# Patient Record
Sex: FEMALE | Race: WHITE | NOT HISPANIC OR LATINO | Employment: OTHER | URBAN - METROPOLITAN AREA
[De-identification: names, ages, dates, MRNs, and addresses within clinical notes are randomized per-mention and may not be internally consistent; named-entity substitution may affect disease eponyms.]

---

## 2023-08-19 PROBLEM — R00.2 PALPITATIONS: Status: ACTIVE | Noted: 2023-08-19

## 2023-08-19 PROBLEM — J30.89 NON-SEASONAL ALLERGIC RHINITIS: Status: ACTIVE | Noted: 2023-08-19

## 2023-08-19 PROBLEM — R94.4 ABNORMAL RENAL FUNCTION TEST: Status: ACTIVE | Noted: 2023-08-19

## 2023-08-19 PROBLEM — R49.0 MUSCLE TENSION DYSPHONIA: Status: ACTIVE | Noted: 2023-08-19

## 2023-08-19 PROBLEM — I10 BENIGN ESSENTIAL HYPERTENSION: Status: ACTIVE | Noted: 2023-08-19

## 2023-08-19 PROBLEM — M81.0 OSTEOPOROSIS: Status: ACTIVE | Noted: 2023-08-19

## 2023-08-19 PROBLEM — M70.61 GREATER TROCHANTERIC BURSITIS OF BOTH HIPS: Status: ACTIVE | Noted: 2023-08-19

## 2023-08-19 PROBLEM — E78.2 MIXED HYPERLIPIDEMIA: Status: ACTIVE | Noted: 2023-08-19

## 2023-08-19 PROBLEM — J96.11 CHRONIC RESPIRATORY FAILURE WITH HYPOXIA, ON HOME OXYGEN THERAPY (MULTI): Status: ACTIVE | Noted: 2023-08-19

## 2023-08-19 PROBLEM — M18.0 PRIMARY OSTEOARTHRITIS OF BOTH FIRST CARPOMETACARPAL JOINTS: Status: ACTIVE | Noted: 2023-08-19

## 2023-08-19 PROBLEM — M19.041 PRIMARY OSTEOARTHRITIS OF BOTH HANDS: Status: ACTIVE | Noted: 2023-08-19

## 2023-08-19 PROBLEM — D68.9 COAGULOPATHY (MULTI): Status: ACTIVE | Noted: 2023-08-19

## 2023-08-19 PROBLEM — M17.0 PRIMARY OSTEOARTHRITIS OF BOTH KNEES: Status: ACTIVE | Noted: 2023-08-19

## 2023-08-19 PROBLEM — N18.9: Status: ACTIVE | Noted: 2023-08-19

## 2023-08-19 PROBLEM — I73.9 ASYMPTOMATIC PERIPHERAL VASCULAR DISEASE (CMS-HCC): Status: ACTIVE | Noted: 2023-08-19

## 2023-08-19 PROBLEM — F32.0 CURRENT MILD EPISODE OF MAJOR DEPRESSIVE DISORDER WITHOUT PRIOR EPISODE (CMS-HCC): Status: ACTIVE | Noted: 2023-08-19

## 2023-08-19 PROBLEM — M70.62 GREATER TROCHANTERIC BURSITIS OF BOTH HIPS: Status: ACTIVE | Noted: 2023-08-19

## 2023-08-19 PROBLEM — G47.33 OSA ON CPAP: Status: ACTIVE | Noted: 2023-08-19

## 2023-08-19 PROBLEM — I50.32 CHRONIC DIASTOLIC HEART FAILURE (MULTI): Status: ACTIVE | Noted: 2023-08-19

## 2023-08-19 PROBLEM — R06.00 DYSPNEA: Status: ACTIVE | Noted: 2023-08-19

## 2023-08-19 PROBLEM — R49.0 HOARSENESS OF VOICE: Status: ACTIVE | Noted: 2023-08-19

## 2023-08-19 PROBLEM — F32.4 DEPRESSION, MAJOR, IN PARTIAL REMISSION (CMS-HCC): Status: ACTIVE | Noted: 2023-08-19

## 2023-08-19 PROBLEM — Z99.2 ESRD (END STAGE RENAL DISEASE) ON DIALYSIS (MULTI): Status: ACTIVE | Noted: 2023-08-19

## 2023-08-19 PROBLEM — N18.4 STAGE 4 CHRONIC KIDNEY DISEASE (MULTI): Status: ACTIVE | Noted: 2023-08-19

## 2023-08-19 PROBLEM — N18.6 ESRD (END STAGE RENAL DISEASE) ON DIALYSIS (MULTI): Status: ACTIVE | Noted: 2023-08-19

## 2023-08-19 PROBLEM — Z99.81 CHRONIC RESPIRATORY FAILURE WITH HYPOXIA, ON HOME OXYGEN THERAPY (MULTI): Status: ACTIVE | Noted: 2023-08-19

## 2023-08-19 PROBLEM — R04.0 EPISTAXIS: Status: ACTIVE | Noted: 2023-08-19

## 2023-08-19 PROBLEM — J38.02 BILATERAL PARTIAL VOCAL CORD PARALYSIS: Status: ACTIVE | Noted: 2023-08-19

## 2023-08-19 PROBLEM — M19.042 PRIMARY OSTEOARTHRITIS OF BOTH HANDS: Status: ACTIVE | Noted: 2023-08-19

## 2023-08-19 RX ORDER — NICOTINE POLACRILEX 2 MG
1 GUM BUCCAL
Status: ON HOLD | COMMUNITY
Start: 2020-02-05 | End: 2023-12-04

## 2023-08-19 RX ORDER — MELATONIN 10 MG
10 CAPSULE ORAL NIGHTLY
Status: ON HOLD | COMMUNITY
Start: 2017-11-28 | End: 2023-12-04 | Stop reason: ALTCHOICE

## 2023-08-19 RX ORDER — ACETAMINOPHEN 500 MG
50 TABLET ORAL DAILY
COMMUNITY

## 2023-08-19 RX ORDER — FERROUS SULFATE 325(65) MG
TABLET ORAL
Status: ON HOLD | COMMUNITY
End: 2023-12-04

## 2023-08-19 RX ORDER — ACETAMINOPHEN 325 MG/1
650 TABLET ORAL EVERY 8 HOURS PRN
COMMUNITY

## 2023-08-19 RX ORDER — LORATADINE 10 MG/1
10 TABLET ORAL DAILY PRN
COMMUNITY

## 2023-08-19 RX ORDER — TRAZODONE HYDROCHLORIDE 100 MG/1
100 TABLET ORAL NIGHTLY
COMMUNITY
End: 2023-12-04 | Stop reason: ALTCHOICE

## 2023-08-19 RX ORDER — FLUTICASONE PROPIONATE 50 MCG
2 SPRAY, SUSPENSION (ML) NASAL DAILY
Status: ON HOLD | COMMUNITY
Start: 2017-11-14 | End: 2023-12-04

## 2023-08-19 RX ORDER — IPRATROPIUM BROMIDE AND ALBUTEROL SULFATE 2.5; .5 MG/3ML; MG/3ML
SOLUTION RESPIRATORY (INHALATION)
Status: ON HOLD | COMMUNITY
End: 2023-12-04

## 2023-08-19 RX ORDER — LIDOCAINE AND PRILOCAINE 25; 25 MG/G; MG/G
CREAM TOPICAL
Status: ON HOLD | COMMUNITY
End: 2023-12-04 | Stop reason: ALTCHOICE

## 2023-08-19 RX ORDER — GUAIFENESIN 600 MG/1
600 TABLET, EXTENDED RELEASE ORAL EVERY 12 HOURS PRN
COMMUNITY

## 2023-08-19 RX ORDER — ONDANSETRON 4 MG/1
4 TABLET, ORALLY DISINTEGRATING ORAL EVERY 6 HOURS PRN
Status: ON HOLD | COMMUNITY
End: 2023-12-04 | Stop reason: ALTCHOICE

## 2023-08-19 RX ORDER — FUROSEMIDE 40 MG/1
40 TABLET ORAL DAILY
Status: ON HOLD | COMMUNITY
End: 2023-12-04

## 2023-08-19 RX ORDER — SODIUM BICARBONATE 650 MG/1
650 TABLET ORAL 2 TIMES DAILY
Status: ON HOLD | COMMUNITY
Start: 2017-11-10 | End: 2023-12-04 | Stop reason: ALTCHOICE

## 2023-08-19 RX ORDER — CALCITRIOL 0.25 UG/1
0.25 CAPSULE ORAL
COMMUNITY
Start: 2018-06-05

## 2023-08-19 RX ORDER — ALUMINUM HYDROXIDE, MAGNESIUM HYDROXIDE, AND SIMETHICONE 2400; 240; 2400 MG/30ML; MG/30ML; MG/30ML
SUSPENSION ORAL
COMMUNITY
Start: 2018-10-04 | End: 2023-12-04 | Stop reason: ALTCHOICE

## 2023-08-19 RX ORDER — GABAPENTIN 100 MG/1
100 CAPSULE ORAL NIGHTLY PRN
COMMUNITY
End: 2024-02-13 | Stop reason: ALTCHOICE

## 2023-08-19 RX ORDER — ASPIRIN 81 MG/1
1 TABLET ORAL DAILY
Status: ON HOLD | COMMUNITY
Start: 2015-04-01 | End: 2023-12-04

## 2023-08-19 RX ORDER — B COMPLEX, C NO.20/FOLIC ACID 1 MG
1 CAPSULE ORAL DAILY
COMMUNITY
End: 2024-01-22 | Stop reason: ENTERED-IN-ERROR

## 2023-08-19 RX ORDER — ERGOCALCIFEROL 1.25 MG/1
1 CAPSULE ORAL
COMMUNITY
Start: 2018-03-06 | End: 2023-12-04 | Stop reason: ALTCHOICE

## 2023-08-19 RX ORDER — ERYTHROPOIETIN 20000 [IU]/ML
20000 INJECTION, SOLUTION INTRAVENOUS; SUBCUTANEOUS
COMMUNITY
Start: 2021-03-03 | End: 2023-12-04 | Stop reason: DRUGHIGH

## 2023-08-19 RX ORDER — METOPROLOL TARTRATE 100 MG/1
100 TABLET ORAL EVERY 12 HOURS
COMMUNITY
Start: 2017-11-24 | End: 2023-12-04 | Stop reason: ALTCHOICE

## 2023-08-19 RX ORDER — MECLIZINE HYDROCHLORIDE 25 MG/1
25 TABLET ORAL DAILY PRN
COMMUNITY
Start: 2021-03-03 | End: 2024-02-13 | Stop reason: ALTCHOICE

## 2023-08-19 RX ORDER — ONDANSETRON 4 MG/1
4 TABLET, FILM COATED ORAL
COMMUNITY
Start: 2019-09-16 | End: 2023-12-04 | Stop reason: ALTCHOICE

## 2023-08-19 RX ORDER — ANAGRELIDE 1 MG/1
1 CAPSULE ORAL 2 TIMES DAILY
Status: ON HOLD | COMMUNITY
Start: 2016-04-20 | End: 2023-12-04

## 2023-08-19 RX ORDER — AMLODIPINE BESYLATE 10 MG/1
1 TABLET ORAL DAILY
Status: ON HOLD | COMMUNITY
Start: 2017-02-27 | End: 2023-12-04

## 2023-08-19 RX ORDER — OMEPRAZOLE 20 MG/1
20 CAPSULE, DELAYED RELEASE ORAL DAILY PRN
COMMUNITY
Start: 2017-09-11

## 2023-08-19 RX ORDER — HYDROXYUREA 500 MG/1
15 CAPSULE ORAL
COMMUNITY
Start: 2022-06-09 | End: 2023-10-13 | Stop reason: SDUPTHER

## 2023-08-19 RX ORDER — TRIAMCINOLONE ACETONIDE 1 MG/G
CREAM TOPICAL
COMMUNITY
Start: 2017-11-13 | End: 2023-12-04 | Stop reason: ALTCHOICE

## 2023-10-12 DIAGNOSIS — D47.3 ESSENTIAL THROMBOCYTOSIS (MULTI): Primary | ICD-10-CM

## 2023-10-12 RX ORDER — HYDROXYUREA 500 MG/1
500 CAPSULE ORAL
Qty: 12 CAPSULE | Refills: 0 | Status: CANCELLED | OUTPATIENT
Start: 2023-10-13

## 2023-10-13 RX ORDER — HYDROXYUREA 500 MG/1
500 CAPSULE ORAL
Qty: 12 CAPSULE | Refills: 11 | Status: SHIPPED | OUTPATIENT
Start: 2023-10-13 | End: 2023-11-10

## 2023-10-31 ENCOUNTER — OFFICE VISIT (OUTPATIENT)
Dept: PULMONOLOGY | Facility: CLINIC | Age: 78
End: 2023-10-31
Payer: MEDICARE

## 2023-10-31 VITALS
BODY MASS INDEX: 31.41 KG/M2 | RESPIRATION RATE: 16 BRPM | SYSTOLIC BLOOD PRESSURE: 191 MMHG | HEART RATE: 80 BPM | WEIGHT: 160 LBS | DIASTOLIC BLOOD PRESSURE: 73 MMHG | HEIGHT: 60 IN | OXYGEN SATURATION: 91 %

## 2023-10-31 DIAGNOSIS — G47.33 OSA ON CPAP: Primary | ICD-10-CM

## 2023-10-31 PROCEDURE — 3078F DIAST BP <80 MM HG: CPT | Performed by: INTERNAL MEDICINE

## 2023-10-31 PROCEDURE — 1125F AMNT PAIN NOTED PAIN PRSNT: CPT | Performed by: INTERNAL MEDICINE

## 2023-10-31 PROCEDURE — 3077F SYST BP >= 140 MM HG: CPT | Performed by: INTERNAL MEDICINE

## 2023-10-31 PROCEDURE — 99213 OFFICE O/P EST LOW 20 MIN: CPT | Performed by: INTERNAL MEDICINE

## 2023-10-31 PROCEDURE — 1159F MED LIST DOCD IN RCRD: CPT | Performed by: INTERNAL MEDICINE

## 2023-10-31 PROCEDURE — 1036F TOBACCO NON-USER: CPT | Performed by: INTERNAL MEDICINE

## 2023-10-31 RX ORDER — LOSARTAN POTASSIUM 25 MG/1
100 TABLET ORAL DAILY
COMMUNITY

## 2023-10-31 RX ORDER — CARVEDILOL 25 MG/1
1 TABLET ORAL
COMMUNITY

## 2023-10-31 ASSESSMENT — PATIENT HEALTH QUESTIONNAIRE - PHQ9
SUM OF ALL RESPONSES TO PHQ9 QUESTIONS 1 & 2: 0
1. LITTLE INTEREST OR PLEASURE IN DOING THINGS: NOT AT ALL
2. FEELING DOWN, DEPRESSED OR HOPELESS: NOT AT ALL

## 2023-10-31 ASSESSMENT — COLUMBIA-SUICIDE SEVERITY RATING SCALE - C-SSRS
1. IN THE PAST MONTH, HAVE YOU WISHED YOU WERE DEAD OR WISHED YOU COULD GO TO SLEEP AND NOT WAKE UP?: NO
2. HAVE YOU ACTUALLY HAD ANY THOUGHTS OF KILLING YOURSELF?: NO
6. HAVE YOU EVER DONE ANYTHING, STARTED TO DO ANYTHING, OR PREPARED TO DO ANYTHING TO END YOUR LIFE?: NO

## 2023-10-31 ASSESSMENT — PAIN SCALES - GENERAL: PAINLEVEL: 10-WORST PAIN EVER

## 2023-10-31 ASSESSMENT — ENCOUNTER SYMPTOMS: DEPRESSION: 0

## 2023-10-31 NOTE — PROGRESS NOTES
Department of Medicine  Division of Pulmonary, Critical Care, and Sleep Medicine  Follow-Up Visit  [facility] - [address], [address]    Physician HPI (10/31/2023):  Very pleasant 76-year-old female with history of hypertension, breast cancer, ESRD on HD, HEATH on CPAP presenting for follow-up.  Has been doing well since last visit, only complaining of dyspnea on exertion sometimes, she increased her O2 with activity and its been helping.   Denied any productive cough, no chest pain, no fever or chills.      Immunization History   Administered Date(s) Administered    Hep A, Unspecified 05/15/2003    Influenza, High Dose Seasonal, Preservative Free 10/07/2018    Influenza, Unspecified 01/16/2014, 10/01/2019    Influenza, seasonal, injectable, preservative free 09/08/2015    Pfizer Purple Cap SARS-CoV-2 01/05/2021, 01/26/2021, 12/08/2021       Current Medications:  Current Outpatient Medications   Medication Instructions    acetaminophen (TYLENOL) 650 mg, oral, Every 6 hours PRN    alum-mag hydroxide-simeth (Maalox MAX) 400-400-40 mg/5 mL suspension oral    amLODIPine (Norvasc) 10 mg tablet 1 tablet, oral, Daily    anagrelide (AGRYLIN) 1 mg, oral, 2 times daily    aspirin 81 mg EC tablet 1 tablet, oral, Daily    B complex-vitamin C-folic acid (Triphrocaps) 1 mg capsule 1 capsule, oral, Daily    biotin 1 mg, oral    calcitriol (ROCALTROL) 0.25 mcg, oral, Every Mon/Wed/Fri    carvedilol (Coreg) 25 mg tablet oral, 2 times daily with meals    cholecalciferol (VITAMIN D-3) 50 mcg, oral, Daily    ergocalciferol (Vitamin D-2) 1.25 MG (10760 UT) capsule 1 capsule, oral, Weekly    ferrous sulfate 325 (65 Fe) MG tablet oral    fluticasone (Flonase) 50 mcg/actuation nasal spray 2 sprays, nasal, Daily    furosemide (Lasix) 40 mg tablet oral    gabapentin (NEURONTIN) 100 mg, oral, Nightly    guaiFENesin (MUCINEX) 600 mg, oral, Every 12 hours PRN    hydroxyurea (HYDREA) 500 mg, oral, Every Mon/Wed/Fri     "ipratropium-albuteroL (Duo-Neb) 0.5-2.5 mg/3 mL nebulizer solution inhalation    lidocaine-prilocaine (Emla) 2.5-2.5 % cream Apply topically to affected area Monday, Wednesday, and Friday    loratadine (CLARITIN) 10 mg, oral, Nightly    losartan (COZAAR) 100 mg, oral, Daily    meclizine (ANTIVERT) 25 mg, oral, Daily PRN    melatonin 10 mg, oral, Nightly    metoprolol tartrate (LOPRESSOR) 100 mg, oral, Every 12 hours    omeprazole (PRILOSEC) 20 mg, oral, Daily PRN    ondansetron (ZOFRAN) 4 mg, oral    ondansetron ODT (ZOFRAN-ODT) 4 mg, oral, Every 6 hours PRN    oxygen (O2) gas therapy Oxygen   Refills: 0       Active    Procrit 20,000 Units, subcutaneous, Weekly    sodium bicarbonate 650 mg, oral, 2 times daily    sodium chloride (Ayr) 0.65 % nasal drops 2 drops, nasal, Every 2 hour PRN    traZODone (DESYREL) 100 mg, oral, Nightly    triamcinolone (Kenalog) 0.1 % cream Topical        Drug Allergies/Intolerances:  Allergies   Allergen Reactions    Bee Pollen Unknown    House Dust Unknown    House Dust Mite Unknown    Adhesive Tape-Silicones Rash          Visit Vitals  BP (!) 191/73   Pulse 80   Resp 16   Ht 1.524 m (5')   Wt 72.6 kg (160 lb)   SpO2 91%   BMI 31.25 kg/m²   Smoking Status Never   BSA 1.75 m²        Physical exam  Constitutional: Normal appearance.  HEENT: Normocephalic and atraumatic.  Cardiovascular: Normal rate and regular rhythm.  Pulmonary: Normal respiratory effort, bilateral clear breath sounds, no wheezing or rhonchi.  Musculoskeletal: No edema, no cyanosis.  Neurological: Awake, alert and oriented x3.  Psychiatric: Normal behavior, mood and affect.      Pulmonary Function Test Results     Pulmonary Functions Testing Results:    No results found for: \"FEV1\", \"FVC\", \"ZHK6AIK\", \"TLC\", \"DLCO\"      Chest Radiograph     XR chest 1 view 06/04/2023    Narrative  Interpreted By:  YVES GONZALEZ MD  MRN: [id number]  Patient Name: TAMMIE RICKETTS    STUDY:  CHEST 1 VIEW;  6/4/2023 8:32 " pm    INDICATION:  cough, sore throat, fever .    COMPARISON:  Chest x-ray 11/14/2022    ACCESSION NUMBER(S):  [id number]    ORDERING CLINICIAN:  BETHANY CUETO    FINDINGS:  Multiple overlying leads are present. Multiple surgical clips overlie  bilateral axilla.    CARDIOMEDIASTINAL SILHOUETTE:  Cardiomediastinal silhouette is stable in size and configuration.  Atherosclerotic calcification of the aorta.    LUNGS:  Note is made of low lung volumes with bronchovascular crowding.  Bibasilar airspace opacities favored to relate to atelectasis. No  consolidation, pleural effusion or pneumothorax.    ABDOMEN:  No remarkable upper abdominal findings.    BONES:  Multilevel degenerative changes of the spine. Left shoulder  osteoarthrosis. Suture anchors noted in the right humeral head.    Impression  Hypoventilatory changes with bibasilar airspace opacities favored to  relate to atelectasis.      Echocardiogram     Echocardiogram     [facility], [address] [place] [address]  Tel [phone] and Fax [phone]    TRANSTHORACIC ECHOCARDIOGRAM REPORT      Patient Name:     ERENDIRA MORROW       Reading Physician:   [id number] Jun RICKETTS MD  Study Date:       6/29/2023           Referring Physician: Pema Victoria MD  MRN/PID:          [id number]            PCP:  Accession/Order#: [id number]        Department Location: [facility] Non  Invasive  YOB: 1945          Fellow:  Gender:           F                   Nurse:  Admit Date:                           Sonographer:         Gina Holcomb [facility]  Admission Status: Outpatient          Additional Staff:  Height:           152.40 cm           CC Report to:  Weight:           73.94 kg            Study Type:          Echocardiogram  BSA:              1.71 m2  Blood Pressure: 120 /60 mmHg    Diagnosis/ICD: I50.32-Chronic diastolic (congestive) heart failure (CHF)  Indication:    Congestive  Heart Failure  Procedure/CPT: Echo Complete w Full [id number]    Patient History:  Pertinent History: Chronic diastolic heart failure, CHF, Dyspnea, HTN,  Hyperlipidemia and PVD. Patient on 2L home O2.    Study Detail: The following Echo studies were performed: 2D, M-Mode, Doppler and  color flow. The patient was awake.      PHYSICIAN INTERPRETATION:  Left Ventricle: The left ventricular systolic function is normal, with an estimated ejection fraction of 55-60%. There are no regional wall motion abnormalities. The left ventricular cavity size is normal. Spectral Doppler shows an impaired relaxation pattern of left ventricular diastolic filling.  Left Atrium: The left atrium is moderately dilated.  Right Ventricle: The right ventricle is normal in size. There is normal right ventricular global systolic function.  Right Atrium: The right atrium is mildly dilated.  Aortic Valve: The aortic valve appears structurally normal. There is mild aortic valve regurgitation. The peak instantaneous gradient of the aortic valve is 9.6 mmHg. The mean gradient of the aortic valve is 5.4 mmHg.  Mitral Valve: The mitral valve is normal in structure. There is mild to moderate mitral valve regurgitation.  Tricuspid Valve: The tricuspid valve is structurally normal. There is mild tricuspid regurgitation.  Pulmonic Valve: The pulmonic valve is not well visualized. The pulmonic valve regurgitation was not well visualized.  Pericardium: There is no pericardial effusion noted.  Aorta: The aortic root is normal.  Pulmonary Artery: The tricuspid regurgitant velocity is 3.14 m/s, and with an estimated right atrial pressure of 10 mmHg, the estimated pulmonary artery pressure is mildly elevated with the RVSP at 49.5 mmHg.  Systemic Veins: The inferior vena cava appears to be of normal size.      CONCLUSIONS:  1. Left ventricular systolic function is normal with a 55-60% estimated ejection fraction.  2. Spectral Doppler shows an impaired  relaxation pattern of left ventricular diastolic filling.  3. The left atrium is moderately dilated.  4. Mild to moderate mitral valve regurgitation.  5. Mild aortic valve regurgitation.  6. There is mild tricuspid regurgitation.  7. The estimated pulmonary artery pressure is mild-moderately elevated with the RVSP at 49.5 mmHg.    QUANTITATIVE DATA SUMMARY:  2D MEASUREMENTS:  Normal Ranges:  IVSd:          1.38 cm    (0.6-1.1cm)  LVPWd:         1.36 cm    (0.6-1.1cm)  LVIDd:         4.23 cm    (3.9-5.9cm)  LVIDs:         2.87 cm  LV Mass Index: 128.3 g/m2  LV % FS        32.3 %    LA VOLUME:  Normal Ranges:  LA Vol A4C:        78.2 ml    (22+/-6mL/m2)  LA Vol A2C:        73.5 ml  LA Vol BP:         77.8 ml  LA Vol Index A4C:  45.7ml/m2  LA Vol Index A2C:  42.9 ml/m2  LA Vol Index BP:   45.5 ml/m2  LA Area A4C:       23.5 cm2  LA Area A2C:       22.2 cm2  LA Major Axis A4C: 6.0 cm  LA Major Axis A2C: 5.7 cm  LA Volume Index:   45.7 ml/m2  LA Vol A4C:        72.5 ml  LA Vol A2C:        68.1 ml    RA VOLUME BY A/L METHOD:  Normal Ranges:  RA Area A4C: 16.7 cm2    M-MODE MEASUREMENTS:  Normal Ranges:  Ao Root: 3.20 cm (2.0-3.7cm)  AoV Exc: 1.68 cm (1.5-2.5cm)  LAs:     4.02 cm (2.7-4.0cm)    AORTA MEASUREMENTS:  Normal Ranges:  AoV Exc:     1.68 cm (1.5-2.5cm)  Ao Sinus, d: 3.00 cm (2.1-3.5cm)  Asc Ao, d:   2.70 cm (2.1-3.4cm)    LV SYSTOLIC FUNCTION BY 2D PLANIMETRY (MOD):  Normal Ranges:  EF-A4C View: 57.5 % (>=55%)  EF-A2C View: 60.7 %  EF-Biplane:  59.8 %    LV DIASTOLIC FUNCTION:  Normal Ranges:  MV Peak E:        1.17 m/s    (0.7-1.2 m/s)  MV Peak A:        0.99 m/s    (0.42-0.7 m/s)  E/A Ratio:        1.18        (1.0-2.2)  MV e'             0.09 m/s    (>8.0)  MV lateral e'     0.09 m/s  MV medial e'      0.05 m/s  MV A Dur:         136.10 msec  E/e' Ratio:       13.00       (<8.0)  PulmV Sys Chidi:    76.30 cm/s  PulmV Wells Chidi:   64.52 cm/s  PulmV S/D Chidi:    1.18  PulmV A Revs Chidi: 22.72 cm/s  PulmV A Revs Dur:  103.81 msec    MITRAL VALVE:  Normal Ranges:  MV Vmax:    1.25 m/s (<=1.3m/s)  MV peak P.3 mmHg (<5mmHg)  MV mean P.5 mmHg (<2mmHg)  MV VTI:     32.29 cm (10-13cm)  MV DT:      163 msec (150-240msec)    AORTIC VALVE:  Normal Ranges:  AoV Vmax:                1.55 m/s (<=1.7m/s)  AoV Peak P.6 mmHg (<20mmHg)  AoV Mean P.4 mmHg (1.7-11.5mmHg)  LVOT Max Chidi:            1.02 m/s (<=1.1m/s)  AoV VTI:                 41.23 cm (18-25cm)  LVOT VTI:                25.78 cm  LVOT Diameter:           2.02 cm  (1.8-2.4cm)  AoV Area, VTI:           2.01 cm2 (2.5-5.5cm2)  AoV Area,Vmax:           2.11 cm2 (2.5-4.5cm2)  AoV Dimensionless Index: 0.63    AORTIC INSUFFICIENCY:  AI Vmax:       3.61 m/s  AI Half-time:  359 msec  AI Decel Time: 1238 msec  AI Decel Rate: 291.96 cm/s2      RIGHT VENTRICLE:  RV Basal 3.00 cm  RV Mid   2.40 cm  RV Major 7.5 cm  TAPSE:   25.0 mm  RV s'    0.00 m/s    TRICUSPID VALVE/RVSP:  Normal Ranges:  Peak TR Velocity: 3.14 m/s  RV Syst Pressure: 49.5 mmHg (< 30mmHg)  TV E Vmax:        0.34 m/s  (0.3-0.7m/s)  TV A Vmax:        0.50 m/s  IVC Diam:         2.14 cm    PULMONIC VALVE:  Normal Ranges:  PV Max Chidi: 1.5 m/s  (0.6-0.9m/s)  PV Max P.6 mmHg    Pulmonary Veins:  PulmV A Revs Dur: 103.81 msec  PulmV A Revs Chidi: 22.72 cm/s  PulmV Wells Chidi:   64.52 cm/s  PulmV S/D Chidi:    1.18  PulmV Sys Chidi:    76.30 cm/s    AORTA:  Asc Ao Diam 2.74 cm      [id number] Jun Ruiz MD  Electronically signed on 2023 at 5:25:57 PM         Final        Chest CT Scan     No results found for this or any previous visit from the past 365 days.       Laboratory Studies     Lab Results   Component Value Date    WBC 12.8 (H) 2023    HGB 11.0 (L) 2023    HCT 35.1 (L) 2023     (H) 2023     2023      Lab Results   Component Value Date    GLUCOSE 101 (H) 2023    CALCIUM 10.1 2023     (L) 2023    K 4.7 2023     "CO2 25 06/04/2023    CL 94 (L) 06/04/2023    BUN 70 (H) 06/04/2023    CREATININE 8.92 (H) 06/04/2023      Lab Results   Component Value Date    ALT 23 12/20/2022    AST 21 12/20/2022    ALKPHOS 201 (H) 12/20/2022    BILITOT 0.3 12/20/2022        Sputum Culture     No results found for: \"AFBCX\"   No results found for: \"RESPCULTCYFI\"  No results found for the last 90 days.          Assessment and Plan / Recommendations     Very pleasant 76-year-old female with history of hypertension, breast cancer, ESRD on HD, HEATH on CPAP presenting for follow-up.    1.  HEATH on CPAP, CPAP pressure increased to 15 and seems to be working better, using CPAP faithfully every night, only complaining of poor fitting and air leak  30 days download  Compliance 96.7%  Average use 9 hours and 28 minutes  AHI 4.7 (was 15 before increasing pressure)  -Continue CPAP current settings, will try DreamWear mask  -Continue O2 during the day and with CPAP  -Return to clinic in 1 year or sooner with any concerns    This dictation was created using voice recognition software. Phonetic and/or minor grammatical errors may exist.        Mercedes Manning MD   10/31/2023    "

## 2023-11-28 DIAGNOSIS — G47.33 OSA ON CPAP: ICD-10-CM

## 2023-12-03 ENCOUNTER — APPOINTMENT (OUTPATIENT)
Dept: RADIOLOGY | Facility: HOSPITAL | Age: 78
DRG: 304 | End: 2023-12-03
Payer: MEDICARE

## 2023-12-03 ENCOUNTER — HOSPITAL ENCOUNTER (OUTPATIENT)
Facility: HOSPITAL | Age: 78
Setting detail: OBSERVATION
LOS: 1 days | Discharge: HOME | DRG: 304 | End: 2023-12-05
Attending: STUDENT IN AN ORGANIZED HEALTH CARE EDUCATION/TRAINING PROGRAM | Admitting: INTERNAL MEDICINE
Payer: MEDICARE

## 2023-12-03 DIAGNOSIS — I10 BENIGN ESSENTIAL HYPERTENSION: ICD-10-CM

## 2023-12-03 DIAGNOSIS — R06.02 SHORTNESS OF BREATH: ICD-10-CM

## 2023-12-03 DIAGNOSIS — N18.6 ESRD NEEDING DIALYSIS (MULTI): ICD-10-CM

## 2023-12-03 DIAGNOSIS — Z99.2 ESRD NEEDING DIALYSIS (MULTI): ICD-10-CM

## 2023-12-03 DIAGNOSIS — I15.1 HYPERTENSION SECONDARY TO OTHER RENAL DISORDERS: Primary | ICD-10-CM

## 2023-12-03 DIAGNOSIS — J90 PLEURAL EFFUSION: ICD-10-CM

## 2023-12-03 LAB
ANION GAP BLDV CALCULATED.4IONS-SCNC: 11 MMOL/L (ref 10–25)
APPARATUS: ABNORMAL
BASE EXCESS BLDV CALC-SCNC: 2.8 MMOL/L (ref -2–3)
BASOPHILS # BLD AUTO: 0.05 X10*3/UL (ref 0–0.1)
BASOPHILS NFR BLD AUTO: 0.5 %
BODY TEMPERATURE: ABNORMAL
CA-I BLDV-SCNC: 1.18 MMOL/L (ref 1.1–1.33)
CHLORIDE BLDV-SCNC: 98 MMOL/L (ref 98–107)
EOSINOPHIL # BLD AUTO: 0.45 X10*3/UL (ref 0–0.4)
EOSINOPHIL NFR BLD AUTO: 4.3 %
ERYTHROCYTE [DISTWIDTH] IN BLOOD BY AUTOMATED COUNT: 15 % (ref 11.5–14.5)
GLUCOSE BLDV-MCNC: 127 MG/DL (ref 74–99)
HCO3 BLDV-SCNC: 28.5 MMOL/L (ref 22–26)
HCT VFR BLD AUTO: 35.1 % (ref 36–46)
HCT VFR BLD EST: 53 % (ref 36–46)
HGB BLD-MCNC: 11.4 G/DL (ref 12–16)
HGB BLDV-MCNC: 17.7 G/DL (ref 12–16)
IMM GRANULOCYTES # BLD AUTO: 0.06 X10*3/UL (ref 0–0.5)
IMM GRANULOCYTES NFR BLD AUTO: 0.6 % (ref 0–0.9)
INHALED O2 CONCENTRATION: 28 %
LACTATE BLDV-SCNC: 1.3 MMOL/L (ref 0.4–2)
LYMPHOCYTES # BLD AUTO: 1.22 X10*3/UL (ref 0.8–3)
LYMPHOCYTES NFR BLD AUTO: 11.6 %
MCH RBC QN AUTO: 33.1 PG (ref 26–34)
MCHC RBC AUTO-ENTMCNC: 32.5 G/DL (ref 32–36)
MCV RBC AUTO: 102 FL (ref 80–100)
MONOCYTES # BLD AUTO: 0.89 X10*3/UL (ref 0.05–0.8)
MONOCYTES NFR BLD AUTO: 8.5 %
NEUTROPHILS # BLD AUTO: 7.85 X10*3/UL (ref 1.6–5.5)
NEUTROPHILS NFR BLD AUTO: 74.5 %
NRBC BLD-RTO: 0 /100 WBCS (ref 0–0)
OXYHGB MFR BLDV: 77.4 % (ref 45–75)
PCO2 BLDV: 46 MM HG (ref 41–51)
PH BLDV: 7.4 PH (ref 7.33–7.43)
PLATELET # BLD AUTO: 390 X10*3/UL (ref 150–450)
PO2 BLDV: 51 MM HG (ref 35–45)
POTASSIUM BLDV-SCNC: 4.7 MMOL/L (ref 3.5–5.3)
RBC # BLD AUTO: 3.44 X10*6/UL (ref 4–5.2)
SAO2 % BLDV: 79 % (ref 45–75)
SODIUM BLDV-SCNC: 133 MMOL/L (ref 136–145)
WBC # BLD AUTO: 10.5 X10*3/UL (ref 4.4–11.3)

## 2023-12-03 PROCEDURE — 83880 ASSAY OF NATRIURETIC PEPTIDE: CPT | Performed by: STUDENT IN AN ORGANIZED HEALTH CARE EDUCATION/TRAINING PROGRAM

## 2023-12-03 PROCEDURE — 71045 X-RAY EXAM CHEST 1 VIEW: CPT | Mod: FY

## 2023-12-03 PROCEDURE — 71045 X-RAY EXAM CHEST 1 VIEW: CPT | Performed by: RADIOLOGY

## 2023-12-03 PROCEDURE — 85018 HEMOGLOBIN: CPT | Mod: 59 | Performed by: STUDENT IN AN ORGANIZED HEALTH CARE EDUCATION/TRAINING PROGRAM

## 2023-12-03 PROCEDURE — 36415 COLL VENOUS BLD VENIPUNCTURE: CPT | Performed by: STUDENT IN AN ORGANIZED HEALTH CARE EDUCATION/TRAINING PROGRAM

## 2023-12-03 PROCEDURE — 84295 ASSAY OF SERUM SODIUM: CPT | Performed by: STUDENT IN AN ORGANIZED HEALTH CARE EDUCATION/TRAINING PROGRAM

## 2023-12-03 PROCEDURE — 94760 N-INVAS EAR/PLS OXIMETRY 1: CPT

## 2023-12-03 PROCEDURE — 99291 CRITICAL CARE FIRST HOUR: CPT | Performed by: STUDENT IN AN ORGANIZED HEALTH CARE EDUCATION/TRAINING PROGRAM

## 2023-12-03 PROCEDURE — 85025 COMPLETE CBC W/AUTO DIFF WBC: CPT | Performed by: STUDENT IN AN ORGANIZED HEALTH CARE EDUCATION/TRAINING PROGRAM

## 2023-12-03 PROCEDURE — 87636 SARSCOV2 & INF A&B AMP PRB: CPT | Performed by: STUDENT IN AN ORGANIZED HEALTH CARE EDUCATION/TRAINING PROGRAM

## 2023-12-03 PROCEDURE — 84484 ASSAY OF TROPONIN QUANT: CPT | Performed by: STUDENT IN AN ORGANIZED HEALTH CARE EDUCATION/TRAINING PROGRAM

## 2023-12-03 RX ORDER — NITROGLYCERIN 0.4 MG/1
0.4 TABLET SUBLINGUAL ONCE
Status: COMPLETED | OUTPATIENT
Start: 2023-12-03 | End: 2023-12-04

## 2023-12-03 ASSESSMENT — COLUMBIA-SUICIDE SEVERITY RATING SCALE - C-SSRS
1. IN THE PAST MONTH, HAVE YOU WISHED YOU WERE DEAD OR WISHED YOU COULD GO TO SLEEP AND NOT WAKE UP?: NO
6. HAVE YOU EVER DONE ANYTHING, STARTED TO DO ANYTHING, OR PREPARED TO DO ANYTHING TO END YOUR LIFE?: NO
2. HAVE YOU ACTUALLY HAD ANY THOUGHTS OF KILLING YOURSELF?: NO

## 2023-12-03 ASSESSMENT — LIFESTYLE VARIABLES
HAVE PEOPLE ANNOYED YOU BY CRITICIZING YOUR DRINKING: NO
HAVE YOU EVER FELT YOU SHOULD CUT DOWN ON YOUR DRINKING: NO
EVER FELT BAD OR GUILTY ABOUT YOUR DRINKING: NO
EVER HAD A DRINK FIRST THING IN THE MORNING TO STEADY YOUR NERVES TO GET RID OF A HANGOVER: NO
REASON UNABLE TO ASSESS: NO

## 2023-12-03 ASSESSMENT — PAIN - FUNCTIONAL ASSESSMENT: PAIN_FUNCTIONAL_ASSESSMENT: 0-10

## 2023-12-03 ASSESSMENT — PAIN SCALES - GENERAL: PAINLEVEL_OUTOF10: 0 - NO PAIN

## 2023-12-04 ENCOUNTER — APPOINTMENT (OUTPATIENT)
Dept: DIALYSIS | Facility: HOSPITAL | Age: 78
End: 2023-12-04
Payer: MEDICARE

## 2023-12-04 ENCOUNTER — APPOINTMENT (OUTPATIENT)
Dept: CARDIOLOGY | Facility: HOSPITAL | Age: 78
DRG: 304 | End: 2023-12-04
Payer: MEDICARE

## 2023-12-04 PROBLEM — I15.1 HYPERTENSION SECONDARY TO OTHER RENAL DISORDERS: Status: ACTIVE | Noted: 2023-12-04

## 2023-12-04 PROBLEM — Z85.3 HISTORY OF MALIGNANT NEOPLASM OF BREAST: Status: ACTIVE | Noted: 2023-12-04

## 2023-12-04 PROBLEM — D47.3 ESSENTIAL THROMBOCYTHEMIA (MULTI): Status: ACTIVE | Noted: 2023-12-04

## 2023-12-04 LAB
ALBUMIN SERPL BCP-MCNC: 3.7 G/DL (ref 3.4–5)
ALBUMIN SERPL BCP-MCNC: 3.7 G/DL (ref 3.4–5)
ALP SERPL-CCNC: 233 U/L (ref 33–136)
ALP SERPL-CCNC: 236 U/L (ref 33–136)
ALT SERPL W P-5'-P-CCNC: 46 U/L (ref 7–45)
ALT SERPL W P-5'-P-CCNC: 51 U/L (ref 7–45)
ANION GAP SERPL CALC-SCNC: 21 MMOL/L (ref 10–20)
ANION GAP SERPL CALC-SCNC: 23 MMOL/L (ref 10–20)
AST SERPL W P-5'-P-CCNC: 33 U/L (ref 9–39)
AST SERPL W P-5'-P-CCNC: 50 U/L (ref 9–39)
BILIRUB SERPL-MCNC: 0.3 MG/DL (ref 0–1.2)
BILIRUB SERPL-MCNC: 0.4 MG/DL (ref 0–1.2)
BNP SERPL-MCNC: 1761 PG/ML (ref 0–99)
BUN SERPL-MCNC: 77 MG/DL (ref 6–23)
BUN SERPL-MCNC: 81 MG/DL (ref 6–23)
CALCIUM SERPL-MCNC: 9.1 MG/DL (ref 8.6–10.3)
CALCIUM SERPL-MCNC: 9.7 MG/DL (ref 8.6–10.3)
CARDIAC TROPONIN I PNL SERPL HS: 37 NG/L (ref 0–13)
CARDIAC TROPONIN I PNL SERPL HS: 62 NG/L (ref 0–13)
CARDIAC TROPONIN I PNL SERPL HS: 64 NG/L (ref 0–13)
CARDIAC TROPONIN I PNL SERPL HS: 67 NG/L (ref 0–13)
CARDIAC TROPONIN I PNL SERPL HS: 67 NG/L (ref 0–13)
CHLORIDE SERPL-SCNC: 96 MMOL/L (ref 98–107)
CHLORIDE SERPL-SCNC: 97 MMOL/L (ref 98–107)
CO2 SERPL-SCNC: 25 MMOL/L (ref 21–32)
CO2 SERPL-SCNC: 26 MMOL/L (ref 21–32)
CREAT SERPL-MCNC: 7.81 MG/DL (ref 0.5–1.05)
CREAT SERPL-MCNC: 8.69 MG/DL (ref 0.5–1.05)
ERYTHROCYTE [DISTWIDTH] IN BLOOD BY AUTOMATED COUNT: 14.9 % (ref 11.5–14.5)
FLUAV RNA RESP QL NAA+PROBE: NOT DETECTED
FLUBV RNA RESP QL NAA+PROBE: NOT DETECTED
GFR SERPL CREATININE-BSD FRML MDRD: 4 ML/MIN/1.73M*2
GFR SERPL CREATININE-BSD FRML MDRD: 5 ML/MIN/1.73M*2
GGT SERPL-CCNC: 184 U/L (ref 5–55)
GLUCOSE SERPL-MCNC: 111 MG/DL (ref 74–99)
GLUCOSE SERPL-MCNC: 128 MG/DL (ref 74–99)
HBV SURFACE AB SER-ACNC: 561.7 MIU/ML
HBV SURFACE AG SERPL QL IA: NONREACTIVE
HCT VFR BLD AUTO: 34.1 % (ref 36–46)
HGB BLD-MCNC: 10.8 G/DL (ref 12–16)
MAGNESIUM SERPL-MCNC: 2.38 MG/DL (ref 1.6–2.4)
MCH RBC QN AUTO: 33.1 PG (ref 26–34)
MCHC RBC AUTO-ENTMCNC: 31.7 G/DL (ref 32–36)
MCV RBC AUTO: 105 FL (ref 80–100)
NRBC BLD-RTO: 0 /100 WBCS (ref 0–0)
PHOSPHATE SERPL-MCNC: 4.7 MG/DL (ref 2.5–4.9)
PLATELET # BLD AUTO: 381 X10*3/UL (ref 150–450)
POTASSIUM SERPL-SCNC: 5 MMOL/L (ref 3.5–5.3)
POTASSIUM SERPL-SCNC: 5.4 MMOL/L (ref 3.5–5.3)
PROT SERPL-MCNC: 5.9 G/DL (ref 6.4–8.2)
PROT SERPL-MCNC: 6.6 G/DL (ref 6.4–8.2)
RBC # BLD AUTO: 3.26 X10*6/UL (ref 4–5.2)
SARS-COV-2 RNA RESP QL NAA+PROBE: NOT DETECTED
SODIUM SERPL-SCNC: 138 MMOL/L (ref 136–145)
SODIUM SERPL-SCNC: 140 MMOL/L (ref 136–145)
WBC # BLD AUTO: 10.8 X10*3/UL (ref 4.4–11.3)

## 2023-12-04 PROCEDURE — 8010000001 HC DIALYSIS - HEMODIALYSIS PER DAY

## 2023-12-04 PROCEDURE — 5A1D70Z PERFORMANCE OF URINARY FILTRATION, INTERMITTENT, LESS THAN 6 HOURS PER DAY: ICD-10-PCS | Performed by: INTERNAL MEDICINE

## 2023-12-04 PROCEDURE — 2500000001 HC RX 250 WO HCPCS SELF ADMINISTERED DRUGS (ALT 637 FOR MEDICARE OP)

## 2023-12-04 PROCEDURE — 80053 COMPREHEN METABOLIC PANEL: CPT

## 2023-12-04 PROCEDURE — 36415 COLL VENOUS BLD VENIPUNCTURE: CPT | Performed by: STUDENT IN AN ORGANIZED HEALTH CARE EDUCATION/TRAINING PROGRAM

## 2023-12-04 PROCEDURE — 2500000001 HC RX 250 WO HCPCS SELF ADMINISTERED DRUGS (ALT 637 FOR MEDICARE OP): Performed by: INTERNAL MEDICINE

## 2023-12-04 PROCEDURE — 99291 CRITICAL CARE FIRST HOUR: CPT | Performed by: INTERNAL MEDICINE

## 2023-12-04 PROCEDURE — 2500000004 HC RX 250 GENERAL PHARMACY W/ HCPCS (ALT 636 FOR OP/ED)

## 2023-12-04 PROCEDURE — 84100 ASSAY OF PHOSPHORUS: CPT

## 2023-12-04 PROCEDURE — 2500000002 HC RX 250 W HCPCS SELF ADMINISTERED DRUGS (ALT 637 FOR MEDICARE OP, ALT 636 FOR OP/ED)

## 2023-12-04 PROCEDURE — 96372 THER/PROPH/DIAG INJ SC/IM: CPT

## 2023-12-04 PROCEDURE — 84484 ASSAY OF TROPONIN QUANT: CPT | Performed by: STUDENT IN AN ORGANIZED HEALTH CARE EDUCATION/TRAINING PROGRAM

## 2023-12-04 PROCEDURE — 86706 HEP B SURFACE ANTIBODY: CPT | Mod: GEALAB | Performed by: INTERNAL MEDICINE

## 2023-12-04 PROCEDURE — 85027 COMPLETE CBC AUTOMATED: CPT

## 2023-12-04 PROCEDURE — 82977 ASSAY OF GGT: CPT

## 2023-12-04 PROCEDURE — 5A09357 ASSISTANCE WITH RESPIRATORY VENTILATION, LESS THAN 24 CONSECUTIVE HOURS, CONTINUOUS POSITIVE AIRWAY PRESSURE: ICD-10-PCS | Performed by: INTERNAL MEDICINE

## 2023-12-04 PROCEDURE — 87340 HEPATITIS B SURFACE AG IA: CPT | Mod: GEALAB | Performed by: INTERNAL MEDICINE

## 2023-12-04 PROCEDURE — 84484 ASSAY OF TROPONIN QUANT: CPT

## 2023-12-04 PROCEDURE — G0378 HOSPITAL OBSERVATION PER HR: HCPCS

## 2023-12-04 PROCEDURE — 94660 CPAP INITIATION&MGMT: CPT

## 2023-12-04 PROCEDURE — 1200000002 HC GENERAL ROOM WITH TELEMETRY DAILY

## 2023-12-04 PROCEDURE — 93005 ELECTROCARDIOGRAM TRACING: CPT

## 2023-12-04 PROCEDURE — 2500000001 HC RX 250 WO HCPCS SELF ADMINISTERED DRUGS (ALT 637 FOR MEDICARE OP): Performed by: STUDENT IN AN ORGANIZED HEALTH CARE EDUCATION/TRAINING PROGRAM

## 2023-12-04 PROCEDURE — 2500000005 HC RX 250 GENERAL PHARMACY W/O HCPCS: Performed by: STUDENT IN AN ORGANIZED HEALTH CARE EDUCATION/TRAINING PROGRAM

## 2023-12-04 PROCEDURE — 83735 ASSAY OF MAGNESIUM: CPT

## 2023-12-04 RX ORDER — DOCUSATE SODIUM 100 MG/1
200 CAPSULE, LIQUID FILLED ORAL NIGHTLY
Status: DISCONTINUED | OUTPATIENT
Start: 2023-12-04 | End: 2023-12-05 | Stop reason: HOSPADM

## 2023-12-04 RX ORDER — AMITRIPTYLINE HYDROCHLORIDE 10 MG/1
10 TABLET, FILM COATED ORAL NIGHTLY
Status: DISCONTINUED | OUTPATIENT
Start: 2023-12-04 | End: 2023-12-05 | Stop reason: HOSPADM

## 2023-12-04 RX ORDER — NITROGLYCERIN 20 MG/100ML
100 INJECTION INTRAVENOUS CONTINUOUS
Status: DISCONTINUED | OUTPATIENT
Start: 2023-12-04 | End: 2023-12-04

## 2023-12-04 RX ORDER — AMITRIPTYLINE HYDROCHLORIDE 10 MG/1
10 TABLET, FILM COATED ORAL DAILY
COMMUNITY
Start: 2023-11-26

## 2023-12-04 RX ORDER — ONDANSETRON HYDROCHLORIDE 2 MG/ML
4 INJECTION, SOLUTION INTRAVENOUS EVERY 4 HOURS PRN
Status: DISCONTINUED | OUTPATIENT
Start: 2023-12-04 | End: 2023-12-05 | Stop reason: HOSPADM

## 2023-12-04 RX ORDER — IPRATROPIUM BROMIDE AND ALBUTEROL SULFATE 2.5; .5 MG/3ML; MG/3ML
3 SOLUTION RESPIRATORY (INHALATION) 3 TIMES DAILY PRN
Status: DISCONTINUED | OUTPATIENT
Start: 2023-12-04 | End: 2023-12-04

## 2023-12-04 RX ORDER — LOSARTAN POTASSIUM 50 MG/1
100 TABLET ORAL DAILY
Status: DISCONTINUED | OUTPATIENT
Start: 2023-12-04 | End: 2023-12-04

## 2023-12-04 RX ORDER — HEPARIN SODIUM 5000 [USP'U]/ML
5000 INJECTION, SOLUTION INTRAVENOUS; SUBCUTANEOUS EVERY 8 HOURS
Status: DISCONTINUED | OUTPATIENT
Start: 2023-12-04 | End: 2023-12-05 | Stop reason: HOSPADM

## 2023-12-04 RX ORDER — CARVEDILOL 12.5 MG/1
25 TABLET ORAL 2 TIMES DAILY
Status: DISCONTINUED | OUTPATIENT
Start: 2023-12-04 | End: 2023-12-05

## 2023-12-04 RX ORDER — MECLIZINE HYDROCHLORIDE 25 MG/1
25 TABLET ORAL DAILY PRN
Status: DISCONTINUED | OUTPATIENT
Start: 2023-12-04 | End: 2023-12-05 | Stop reason: HOSPADM

## 2023-12-04 RX ORDER — AMLODIPINE BESYLATE 5 MG/1
2.5 TABLET ORAL DAILY
Status: DISCONTINUED | OUTPATIENT
Start: 2023-12-04 | End: 2023-12-05

## 2023-12-04 RX ORDER — LABETALOL HYDROCHLORIDE 5 MG/ML
20 INJECTION, SOLUTION INTRAVENOUS EVERY 6 HOURS PRN
Status: DISCONTINUED | OUTPATIENT
Start: 2023-12-04 | End: 2023-12-04

## 2023-12-04 RX ORDER — NICARDIPINE HYDROCHLORIDE 0.2 MG/ML
0-15 INJECTION INTRAVENOUS CONTINUOUS
Status: DISCONTINUED | OUTPATIENT
Start: 2023-12-04 | End: 2023-12-04

## 2023-12-04 RX ORDER — CALCITRIOL 0.25 UG/1
0.25 CAPSULE ORAL
Status: DISCONTINUED | OUTPATIENT
Start: 2023-12-04 | End: 2023-12-05 | Stop reason: HOSPADM

## 2023-12-04 RX ORDER — GABAPENTIN 100 MG/1
100 CAPSULE ORAL NIGHTLY PRN
Status: DISCONTINUED | OUTPATIENT
Start: 2023-12-04 | End: 2023-12-05 | Stop reason: HOSPADM

## 2023-12-04 RX ORDER — LORATADINE 10 MG/1
10 TABLET ORAL DAILY PRN
Status: DISCONTINUED | OUTPATIENT
Start: 2023-12-04 | End: 2023-12-05 | Stop reason: HOSPADM

## 2023-12-04 RX ORDER — POLYETHYLENE GLYCOL 3350 17 G/17G
17 POWDER, FOR SOLUTION ORAL DAILY
Status: DISCONTINUED | OUTPATIENT
Start: 2023-12-04 | End: 2023-12-04

## 2023-12-04 RX ORDER — AMOXICILLIN 250 MG
1 CAPSULE ORAL NIGHTLY
COMMUNITY

## 2023-12-04 RX ORDER — CHOLECALCIFEROL (VITAMIN D3) 25 MCG
2000 TABLET ORAL DAILY
Status: DISCONTINUED | OUTPATIENT
Start: 2023-12-04 | End: 2023-12-05 | Stop reason: HOSPADM

## 2023-12-04 RX ORDER — LABETALOL HYDROCHLORIDE 5 MG/ML
10 INJECTION, SOLUTION INTRAVENOUS EVERY 6 HOURS PRN
Status: DISCONTINUED | OUTPATIENT
Start: 2023-12-04 | End: 2023-12-05

## 2023-12-04 RX ORDER — CALCIUM CARBONATE 200(500)MG
1000 TABLET,CHEWABLE ORAL
Status: DISCONTINUED | OUTPATIENT
Start: 2023-12-04 | End: 2023-12-05 | Stop reason: HOSPADM

## 2023-12-04 RX ORDER — POLYETHYLENE GLYCOL 3350 17 G/17G
17 POWDER, FOR SOLUTION ORAL DAILY
Status: DISCONTINUED | OUTPATIENT
Start: 2023-12-04 | End: 2023-12-05 | Stop reason: HOSPADM

## 2023-12-04 RX ORDER — NICOTINE POLACRILEX 2 MG
1 GUM BUCCAL DAILY
Status: DISCONTINUED | OUTPATIENT
Start: 2023-12-04 | End: 2023-12-04

## 2023-12-04 RX ORDER — LABETALOL HYDROCHLORIDE 5 MG/ML
10 INJECTION, SOLUTION INTRAVENOUS ONCE
Status: COMPLETED | OUTPATIENT
Start: 2023-12-04 | End: 2023-12-04

## 2023-12-04 RX ORDER — HYDROXYUREA 500 MG/1
500 CAPSULE ORAL 3 TIMES WEEKLY
Status: CANCELLED | OUTPATIENT
Start: 2023-12-06

## 2023-12-04 RX ORDER — ANAGRELIDE 0.5 MG/1
1 CAPSULE ORAL 2 TIMES DAILY
Status: DISCONTINUED | OUTPATIENT
Start: 2023-12-04 | End: 2023-12-04

## 2023-12-04 RX ORDER — HYDROXYUREA 500 MG/1
500 CAPSULE ORAL
COMMUNITY
Start: 2023-11-15 | End: 2024-06-20 | Stop reason: SDUPTHER

## 2023-12-04 RX ORDER — ERYTHROPOIETIN 2000 [IU]/ML
2000 INJECTION, SOLUTION INTRAVENOUS; SUBCUTANEOUS 3 TIMES WEEKLY
COMMUNITY
End: 2024-01-22 | Stop reason: ENTERED-IN-ERROR

## 2023-12-04 RX ORDER — IPRATROPIUM BROMIDE AND ALBUTEROL SULFATE 2.5; .5 MG/3ML; MG/3ML
3 SOLUTION RESPIRATORY (INHALATION) EVERY 2 HOUR PRN
Status: DISCONTINUED | OUTPATIENT
Start: 2023-12-04 | End: 2023-12-05 | Stop reason: HOSPADM

## 2023-12-04 RX ORDER — PANTOPRAZOLE SODIUM 20 MG/1
20 TABLET, DELAYED RELEASE ORAL DAILY PRN
Status: DISCONTINUED | OUTPATIENT
Start: 2023-12-04 | End: 2023-12-05

## 2023-12-04 RX ORDER — ACETAMINOPHEN 500 MG
5 TABLET ORAL NIGHTLY
Status: DISCONTINUED | OUTPATIENT
Start: 2023-12-04 | End: 2023-12-05 | Stop reason: HOSPADM

## 2023-12-04 RX ORDER — HYDROXYUREA 500 MG/1
500 CAPSULE ORAL 3 TIMES WEEKLY
Status: DISCONTINUED | OUTPATIENT
Start: 2023-12-04 | End: 2023-12-05 | Stop reason: HOSPADM

## 2023-12-04 RX ORDER — AMOXICILLIN 250 MG
1 CAPSULE ORAL NIGHTLY
Status: DISCONTINUED | OUTPATIENT
Start: 2023-12-04 | End: 2023-12-04

## 2023-12-04 RX ORDER — GUAIFENESIN 600 MG/1
600 TABLET, EXTENDED RELEASE ORAL EVERY 12 HOURS PRN
Status: DISCONTINUED | OUTPATIENT
Start: 2023-12-04 | End: 2023-12-05 | Stop reason: HOSPADM

## 2023-12-04 RX ORDER — ACETAMINOPHEN 325 MG/1
650 TABLET ORAL EVERY 8 HOURS PRN
Status: DISCONTINUED | OUTPATIENT
Start: 2023-12-04 | End: 2023-12-05 | Stop reason: HOSPADM

## 2023-12-04 RX ORDER — BUTYROSPERMUM PARKII(SHEA BUTTER), SIMMONDSIA CHINENSIS (JOJOBA) SEED OIL, ALOE BARBADENSIS LEAF EXTRACT .01; 1; 3.5 G/100G; G/100G; G/100G
250 LIQUID TOPICAL DAILY
COMMUNITY
End: 2024-01-22 | Stop reason: ENTERED-IN-ERROR

## 2023-12-04 RX ORDER — LOSARTAN POTASSIUM 50 MG/1
100 TABLET ORAL DAILY
Status: DISCONTINUED | OUTPATIENT
Start: 2023-12-04 | End: 2023-12-05 | Stop reason: HOSPADM

## 2023-12-04 RX ORDER — CALCIUM CARBONATE 200(500)MG
TABLET,CHEWABLE ORAL
Status: COMPLETED
Start: 2023-12-04 | End: 2023-12-04

## 2023-12-04 RX ORDER — FLUTICASONE PROPIONATE 50 MCG
2 SPRAY, SUSPENSION (ML) NASAL DAILY
Status: DISCONTINUED | OUTPATIENT
Start: 2023-12-04 | End: 2023-12-05 | Stop reason: HOSPADM

## 2023-12-04 RX ORDER — CARVEDILOL 12.5 MG/1
25 TABLET ORAL
Status: DISCONTINUED | OUTPATIENT
Start: 2023-12-04 | End: 2023-12-04

## 2023-12-04 RX ORDER — BUTYROSPERMUM PARKII(SHEA BUTTER), SIMMONDSIA CHINENSIS (JOJOBA) SEED OIL, ALOE BARBADENSIS LEAF EXTRACT .01; 1; 3.5 G/100G; G/100G; G/100G
250 LIQUID TOPICAL DAILY
Status: DISCONTINUED | OUTPATIENT
Start: 2023-12-04 | End: 2023-12-05 | Stop reason: HOSPADM

## 2023-12-04 RX ORDER — TRIAMCINOLONE ACETONIDE 1 MG/G
CREAM TOPICAL DAILY PRN
Status: ON HOLD | COMMUNITY
End: 2023-12-04 | Stop reason: ALTCHOICE

## 2023-12-04 RX ORDER — HYDRALAZINE HYDROCHLORIDE 10 MG/1
10 TABLET, FILM COATED ORAL 3 TIMES DAILY
Status: DISCONTINUED | OUTPATIENT
Start: 2023-12-04 | End: 2023-12-05

## 2023-12-04 RX ADMIN — Medication 5 MG: at 21:53

## 2023-12-04 RX ADMIN — NICARDIPINE HYDROCHLORIDE 2.5 MG/HR: 0.2 INJECTION, SOLUTION INTRAVENOUS at 08:04

## 2023-12-04 RX ADMIN — HYDRALAZINE HYDROCHLORIDE 10 MG: 10 TABLET, FILM COATED ORAL at 21:53

## 2023-12-04 RX ADMIN — CALCITRIOL CAPSULES 0.25 MCG 0.25 MCG: 0.25 CAPSULE ORAL at 09:42

## 2023-12-04 RX ADMIN — Medication 1 CAPSULE: at 09:41

## 2023-12-04 RX ADMIN — LABETALOL HYDROCHLORIDE 10 MG: 5 INJECTION, SOLUTION INTRAVENOUS at 17:29

## 2023-12-04 RX ADMIN — Medication 250 MG: at 12:16

## 2023-12-04 RX ADMIN — LOSARTAN POTASSIUM 100 MG: 50 TABLET, FILM COATED ORAL at 09:41

## 2023-12-04 RX ADMIN — FLUTICASONE PROPIONATE 2 SPRAY: 50 SPRAY, METERED NASAL at 09:41

## 2023-12-04 RX ADMIN — AMITRIPTYLINE HYDROCHLORIDE 10 MG: 10 TABLET, FILM COATED ORAL at 21:53

## 2023-12-04 RX ADMIN — SENNOSIDES AND DOCUSATE SODIUM 1 TABLET: 8.6; 5 TABLET ORAL at 21:52

## 2023-12-04 RX ADMIN — NITROGLYCERIN 100 MCG/MIN: 20 INJECTION INTRAVENOUS at 01:22

## 2023-12-04 RX ADMIN — AMLODIPINE BESYLATE 2.5 MG: 5 TABLET ORAL at 21:53

## 2023-12-04 RX ADMIN — CALCIUM CARBONATE (ANTACID) CHEW TAB 500 MG 1000 MG: 500 CHEW TAB at 11:08

## 2023-12-04 RX ADMIN — HEPARIN SODIUM 5000 UNITS: 5000 INJECTION INTRAVENOUS; SUBCUTANEOUS at 14:11

## 2023-12-04 RX ADMIN — BENZOCAINE AND MENTHOL 1 LOZENGE: 15; 3.6 LOZENGE ORAL at 22:35

## 2023-12-04 RX ADMIN — LORATADINE 10 MG: 10 TABLET ORAL at 21:53

## 2023-12-04 RX ADMIN — NITROGLYCERIN 0.4 MG: 0.4 TABLET SUBLINGUAL at 00:04

## 2023-12-04 RX ADMIN — HEPARIN SODIUM 5000 UNITS: 5000 INJECTION INTRAVENOUS; SUBCUTANEOUS at 09:42

## 2023-12-04 RX ADMIN — CARVEDILOL 25 MG: 12.5 TABLET, FILM COATED ORAL at 21:53

## 2023-12-04 RX ADMIN — GABAPENTIN 100 MG: 100 CAPSULE ORAL at 21:53

## 2023-12-04 RX ADMIN — CALCIUM CARBONATE (ANTACID) CHEW TAB 500 MG 1000 MG: 500 CHEW TAB at 11:30

## 2023-12-04 RX ADMIN — HEPARIN SODIUM 5000 UNITS: 5000 INJECTION INTRAVENOUS; SUBCUTANEOUS at 23:07

## 2023-12-04 RX ADMIN — Medication 2000 UNITS: at 09:41

## 2023-12-04 RX ADMIN — HYDROXYUREA 500 MG: 500 CAPSULE ORAL at 14:07

## 2023-12-04 RX ADMIN — CARVEDILOL 25 MG: 12.5 TABLET, FILM COATED ORAL at 09:41

## 2023-12-04 SDOH — SOCIAL STABILITY: SOCIAL INSECURITY: ARE THERE ANY APPARENT SIGNS OF INJURIES/BEHAVIORS THAT COULD BE RELATED TO ABUSE/NEGLECT?: NO

## 2023-12-04 SDOH — SOCIAL STABILITY: SOCIAL INSECURITY: ABUSE: ADULT

## 2023-12-04 SDOH — SOCIAL STABILITY: SOCIAL INSECURITY: DO YOU FEEL ANYONE HAS EXPLOITED OR TAKEN ADVANTAGE OF YOU FINANCIALLY OR OF YOUR PERSONAL PROPERTY?: NO

## 2023-12-04 SDOH — SOCIAL STABILITY: SOCIAL INSECURITY: ARE YOU OR HAVE YOU BEEN THREATENED OR ABUSED PHYSICALLY, EMOTIONALLY, OR SEXUALLY BY ANYONE?: NO

## 2023-12-04 SDOH — SOCIAL STABILITY: SOCIAL INSECURITY: DO YOU FEEL UNSAFE GOING BACK TO THE PLACE WHERE YOU ARE LIVING?: NO

## 2023-12-04 SDOH — SOCIAL STABILITY: SOCIAL INSECURITY: HAVE YOU HAD THOUGHTS OF HARMING ANYONE ELSE?: NO

## 2023-12-04 SDOH — SOCIAL STABILITY: SOCIAL INSECURITY: DOES ANYONE TRY TO KEEP YOU FROM HAVING/CONTACTING OTHER FRIENDS OR DOING THINGS OUTSIDE YOUR HOME?: NO

## 2023-12-04 SDOH — SOCIAL STABILITY: SOCIAL INSECURITY: WERE YOU ABLE TO COMPLETE ALL THE BEHAVIORAL HEALTH SCREENINGS?: YES

## 2023-12-04 SDOH — SOCIAL STABILITY: SOCIAL INSECURITY: HAS ANYONE EVER THREATENED TO HURT YOUR FAMILY OR YOUR PETS?: NO

## 2023-12-04 ASSESSMENT — ENCOUNTER SYMPTOMS
DIARRHEA: 0
NAUSEA: 1
VOMITING: 1
CONSTIPATION: 0
SHORTNESS OF BREATH: 1

## 2023-12-04 ASSESSMENT — ACTIVITIES OF DAILY LIVING (ADL)
FEEDING YOURSELF: INDEPENDENT
HEARING - LEFT EAR: FUNCTIONAL
ASSISTIVE_DEVICE: WALKER
TOILETING: INDEPENDENT
WALKS IN HOME: INDEPENDENT
HEARING - RIGHT EAR: FUNCTIONAL
DRESSING YOURSELF: INDEPENDENT
JUDGMENT_ADEQUATE_SAFELY_COMPLETE_DAILY_ACTIVITIES: YES
PATIENT'S MEMORY ADEQUATE TO SAFELY COMPLETE DAILY ACTIVITIES?: YES
LACK_OF_TRANSPORTATION: NO
ADEQUATE_TO_COMPLETE_ADL: YES
GROOMING: INDEPENDENT
BATHING: INDEPENDENT

## 2023-12-04 ASSESSMENT — COGNITIVE AND FUNCTIONAL STATUS - GENERAL
HELP NEEDED FOR BATHING: A LITTLE
STANDING UP FROM CHAIR USING ARMS: A LITTLE
DAILY ACTIVITIY SCORE: 21
DRESSING REGULAR UPPER BODY CLOTHING: A LITTLE
MOVING TO AND FROM BED TO CHAIR: A LITTLE
CLIMB 3 TO 5 STEPS WITH RAILING: A LITTLE
DRESSING REGULAR LOWER BODY CLOTHING: A LITTLE
WALKING IN HOSPITAL ROOM: A LITTLE
MOBILITY SCORE: 20
PATIENT BASELINE BEDBOUND: NO

## 2023-12-04 ASSESSMENT — LIFESTYLE VARIABLES
SKIP TO QUESTIONS 9-10: 1
AUDIT-C TOTAL SCORE: 0
AUDIT-C TOTAL SCORE: 0
HOW OFTEN DO YOU HAVE 6 OR MORE DRINKS ON ONE OCCASION: NEVER
HOW MANY STANDARD DRINKS CONTAINING ALCOHOL DO YOU HAVE ON A TYPICAL DAY: PATIENT DOES NOT DRINK
HOW OFTEN DO YOU HAVE A DRINK CONTAINING ALCOHOL: NEVER

## 2023-12-04 ASSESSMENT — PAIN SCALES - GENERAL
PAINLEVEL_OUTOF10: 0 - NO PAIN
PAINLEVEL_OUTOF10: 0 - NO PAIN

## 2023-12-04 NOTE — HOSPITAL COURSE
Joyce Bethea is a 78 y.o. who was brought in by ambulance on 12/3 after being found with respiratory distress. Per EMS, 222/109 bp initially at around 10:17 PM with 88 hr, 31 rr labored breathing, 97% SpO2. Pt pt was put on 4L NC due to increased WOB.    Pt states at time of admission she had generalized weakness, shortness of breath, n/v, HA (without visual changes) and chest heaviness before coming to the ED. She has hx of similar symptoms believed to be secondary to elevated BP which gets better after dialysis. These hypertensive episodes started 2-3 months ago. Receives MWF HD, has not missed a session. In the ED, vitals afebrile, 80 hr, 199/78 going up to 227/74. Rr 18, 100% spo2. Labs significant for potassium 5.0, bun 77, cr 7.81. alk phos 236 around baseline. Ast and alt in 50s. BNP 1.7k. trop 37 trending to 64. Glucose 128.   Wbc 10.5, hgb 11.4, plt 390. Negative flu a, b and covid. VBG 7.4, 46 pCO2. CXR r basilar atelectasis.     In the ED pt given 0.4mg ntg sl and started on NTG drip. 12/4: She was transferred to ICU and started on a nicardipine drip, this was discontinued same day once SBP<190 and pt asymptomatic (pt's SBP at home ~190 in the past 2 months). She was resumed on her home anti-hypertensive regimen with losartan 100mg and coreg 25mg BID. Dialysis occurred on 12/4. She was assessed by nephrology.    Pt's BP overnight on 12/4 and 12/5AM was elevated (max ), requiring labetalol and hydralazine. Pt will be discharged on amlodipine 5mg daily in addition to existing losartan/coreg. Follow up with Dr. Camara outpatient and PCP in 1-2 weeks will be arranged.

## 2023-12-04 NOTE — H&P
History Of Present Illness  Joyce Bethea is a 78 y.o. female Sister living at [facility] with HTN, HLD, hx breast cancer 1991 s/p mastectomies, ESRD on HD MWF, HEATH on CPAP, chronic respiratory failure on 2L NC, essential thrombocytosis, HFpEF 55-60 EF 6/2023, MDD, chronic anemia, who was brought in by ambulance after being found with respiratory distress, admitted to ICU for hypertensive emergency started on NTG drip in ED. History obtained from pt.   Since around 9:30 PM 12/3/23, pt has noted generalized weakness, shortness of breath and chest heaviness which was constant before coming to the ED. She has hx of similar happening before along with elevated BP which gets better after dialysis. These hypertensive episodes started 2-3 months ago. She also notes HA which started tonight, denies vision changes. Pt had 1 episode of N/V overnight. She states she has N/V when BP goes low or recently when going high. Denies CP on exertion.   Gets HD MWF, sees Dr. Camara. Last HD 12/1/23 and pt states they “did not take as much” fluids off. Does not make much urine at baseline. She says she is on a fluid restriction.   Has chronic lower back pain. Ambulates with a walker.     Per EMS, 222/109 bp initially at around 10:17 PM with 88 hr, 31 rr labored breathing, 97% SpO2. EMS note states pt was put on 4L NC.   In the ED, vitals afebrile, 80 hr, 199/78 going up to 227/74. Rr 18, 100% spo2.   Labs significant for potassium 5.0, bun 77, cr 7.81. alk phos 236 around baseline. Ast and alt in 50s. BNP 1.7k. trop 37 trending to 64. Glucose 128.   Wbc 10.5, hgb 11.4, plt 390. Negative flu a, b and covid. VBG 7.4, 46 pCO2.   CXR r basilar atelectasis.   In the ED pt given 0.4mg ntg sl and started on NTG drip.     Allergies: bee pollen, dust, mites, adhesive tape silicones rash   Surgical hx: R AV fistula creation, knee arthroscopy with medial meniscus repair, rotator cuff repair, tonsillectomy, b/l mastectomy   Family hx: Mother:  HTN, leukemia, OA. Father: colon cancer, DM, HTN. Sister: OA. Aunt: OA, CVA. Uncle: CVA.   Social hx: no tobacco or alcohol use. Is a Nun. .     Past Medical History  Past Medical History:   Diagnosis Date    Body mass index (BMI) 31.0-31.9, adult 04/12/2019    BMI 31.0-31.9,adult    Encounter for follow-up examination after completed treatment for conditions other than malignant neoplasm 11/30/2017    Hospital discharge follow-up    Other conditions influencing health status 03/12/2019    History of cough    Other specified disorders of bone, unspecified site 03/11/2015    Abnormal bone formation    Personal history of malignant neoplasm of breast 02/05/2014    Personal history of breast cancer    Personal history of malignant neoplasm, unspecified     History of malignant neoplasm    Personal history of other diseases of the circulatory system     History of hypertension    Personal history of other diseases of the digestive system     History of gastroesophageal reflux (GERD)    Personal history of other diseases of the musculoskeletal system and connective tissue 01/21/2016    History of trigger finger    Personal history of other diseases of the musculoskeletal system and connective tissue     History of arthritis    Personal history of other diseases of the nervous system and sense organs     History of sleep apnea    Personal history of other diseases of urinary system     History of kidney disease    Respiratory failure, unspecified with hypoxia (CMS/HCC) 12/29/2017    Respiratory failure with hypoxia    Shortness of breath 10/06/2017    Exertional shortness of breath    Unspecified cataract     Cataracts, bilateral    Unspecified fracture of unspecified forearm, sequela 10/04/2018    Forearm fracture, sequela       Surgical History  Past Surgical History:   Procedure Laterality Date    COLONOSCOPY  02/05/2014    Colonoscopy (Fiberoptic)    IR CVC TUNNELED  10/12/2021    IR CVC TUNNELED 10/12/2021 [facility]  CLINICAL LEGACY    KNEE ARTHROSCOPY W/ MENISCAL REPAIR  04/01/2014    Knee Arthroscopy With Medial Meniscus Repair    MASTECTOMY  02/05/2014    Breast Surgery Mastectomy    MR HEAD ANGIO WO IV CONTRAST  2/23/2019    MR HEAD ANGIO WO IV CONTRAST 2/23/2019 [facility]    MR NECK ANGIO WO IV CONTRAST  2/23/2019    MR NECK ANGIO WO IV CONTRAST 2/23/2019 [facility]    OTHER SURGICAL HISTORY  09/21/2021    Arteriovenous fistula creation procedure    ROTATOR CUFF REPAIR  04/01/2014    Rotator Cuff Repair    TONSILLECTOMY  02/05/2014    Tonsillectomy        Social History  She reports that she has never smoked. She has never used smokeless tobacco. She reports that she does not drink alcohol and does not use drugs.    Family History  Family History   Problem Relation Name Age of Onset    Hypertension Mother      Leukemia Mother      Osteoarthritis Mother      Colon cancer Father      Diabetes Father      Hypertension Father      Osteoarthritis Sister      Osteoarthritis Other Aunt     Stroke Other Aunt     Stroke Other Uncle     Breast cancer Other Family History         Allergies  Bee pollen, House dust, House dust mite, and Adhesive tape-silicones    Review of Systems   Constitutional:         Generalized weakness    Respiratory:  Positive for shortness of breath.    Cardiovascular:  Positive for chest pain. Negative for leg swelling.   Gastrointestinal:  Positive for nausea and vomiting. Negative for constipation and diarrhea.   All other systems reviewed and are negative.       Physical Exam  Vitals reviewed.   Constitutional:       General: She is not in acute distress.     Comments: On NC    HENT:      Head: Normocephalic and atraumatic.   Eyes:      Extraocular Movements: Extraocular movements intact.      Conjunctiva/sclera: Conjunctivae normal.   Neck:      Comments: + mild JVD   Cardiovascular:      Rate and Rhythm: Normal rate and regular rhythm.      Heart sounds: Murmur heard.      No  friction rub. No gallop.      Comments: 1/6 systolic murmur   Pulmonary:      Effort: Pulmonary effort is normal.      Breath sounds: Normal breath sounds. No wheezing, rhonchi or rales.   Abdominal:      General: Bowel sounds are normal.      Palpations: Abdomen is soft. There is no mass.      Tenderness: There is no abdominal tenderness. There is no guarding or rebound.   Musculoskeletal:         General: No tenderness.      Cervical back: Neck supple.      Right lower leg: No edema.      Left lower leg: No edema.   Skin:     General: Skin is warm and dry.      Findings: No bruising or rash.   Neurological:      Mental Status: She is alert. Mental status is at baseline.      Cranial Nerves: No cranial nerve deficit.      Sensory: No sensory deficit.      Motor: No weakness.      Comments: Answering questions, following commands. Moving all extremities.   CN 2-12 unremarkable    Psychiatric:         Mood and Affect: Mood and affect normal.          Last Recorded Vitals  Blood pressure (!) 170/100, pulse 71, temperature 36 °C (96.8 °F), temperature source Temporal, resp. rate 17, height 1.524 m (5'), weight 76.6 kg (168 lb 14 oz), SpO2 98 %.    Relevant Results      XR chest 1 view    Result Date: 12/3/2023  Interpreted By:  Margaret Weinstein, STUDY: XR CHEST 1 VIEW;  12/3/2023 11:16 pm   INDICATION: Signs/Symptoms:sob.   COMPARISON: 06/04/2023   ACCESSION NUMBER(S): [id number]   ORDERING CLINICIAN: SHERON ARANDA   FINDINGS: Bilateral axillary surgical clips.   CARDIOMEDIASTINAL SILHOUETTE: Stable enlarged cardiac silhouette.   LUNGS: No pulmonary consolidation, pleural effusion or pneumothorax. Mild right basilar atelectasis.   ABDOMEN: No remarkable upper abdominal findings.   BONES: No acute osseous abnormality. Postsurgical changes of distal right clavicle resection and anchors in the humeral head.       Mild right basilar atelectasis.   MACRO: None   Signed by: Margaret Weinstein 12/3/2023 11:51 PM Dictation  workstation:   [id number]   Results for orders placed or performed during the hospital encounter of 12/03/23 (from the past 24 hour(s))   CBC and Auto Differential   Result Value Ref Range    WBC 10.5 4.4 - 11.3 x10*3/uL    nRBC 0.0 0.0 - 0.0 /100 WBCs    RBC 3.44 (L) 4.00 - 5.20 x10*6/uL    Hemoglobin 11.4 (L) 12.0 - 16.0 g/dL    Hematocrit 35.1 (L) 36.0 - 46.0 %     (H) 80 - 100 fL    MCH 33.1 26.0 - 34.0 pg    MCHC 32.5 32.0 - 36.0 g/dL    RDW 15.0 (H) 11.5 - 14.5 %    Platelets 390 150 - 450 x10*3/uL    Neutrophils % 74.5 40.0 - 80.0 %    Immature Granulocytes %, Automated 0.6 0.0 - 0.9 %    Lymphocytes % 11.6 13.0 - 44.0 %    Monocytes % 8.5 2.0 - 10.0 %    Eosinophils % 4.3 0.0 - 6.0 %    Basophils % 0.5 0.0 - 2.0 %    Neutrophils Absolute 7.85 (H) 1.60 - 5.50 x10*3/uL    Immature Granulocytes Absolute, Automated 0.06 0.00 - 0.50 x10*3/uL    Lymphocytes Absolute 1.22 0.80 - 3.00 x10*3/uL    Monocytes Absolute 0.89 (H) 0.05 - 0.80 x10*3/uL    Eosinophils Absolute 0.45 (H) 0.00 - 0.40 x10*3/uL    Basophils Absolute 0.05 0.00 - 0.10 x10*3/uL   Comprehensive metabolic panel   Result Value Ref Range    Glucose 128 (H) 74 - 99 mg/dL    Sodium 140 136 - 145 mmol/L    Potassium 5.0 3.5 - 5.3 mmol/L    Chloride 97 (L) 98 - 107 mmol/L    Bicarbonate 25 21 - 32 mmol/L    Anion Gap 23 (H) 10 - 20 mmol/L    Urea Nitrogen 77 (H) 6 - 23 mg/dL    Creatinine 7.81 (H) 0.50 - 1.05 mg/dL    eGFR 5 (L) >60 mL/min/1.73m*2    Calcium 9.7 8.6 - 10.3 mg/dL    Albumin 3.7 3.4 - 5.0 g/dL    Alkaline Phosphatase 236 (H) 33 - 136 U/L    Total Protein 6.6 6.4 - 8.2 g/dL    AST 50 (H) 9 - 39 U/L    Bilirubin, Total 0.4 0.0 - 1.2 mg/dL    ALT 51 (H) 7 - 45 U/L   Troponin I, High Sensitivity   Result Value Ref Range    Troponin I, High Sensitivity 37 (H) 0 - 13 ng/L   B-Type Natriuretic Peptide   Result Value Ref Range    BNP 1,761 (H) 0 - 99 pg/mL   Blood Gas Venous Full Panel   Result Value Ref Range    POCT pH, Venous 7.40 7.33 -  7.43 pH    POCT pCO2, Venous 46 41 - 51 mm Hg    POCT pO2, Venous 51 (H) 35 - 45 mm Hg    POCT SO2, Venous 79 (H) 45 - 75 %    POCT Oxy Hemoglobin, Venous 77.4 (H) 45.0 - 75.0 %    POCT Hematocrit Calculated, Venous 53.0 (H) 36.0 - 46.0 %    POCT Sodium, Venous 133 (L) 136 - 145 mmol/L    POCT Potassium, Venous 4.7 3.5 - 5.3 mmol/L    POCT Chloride, Venous 98 98 - 107 mmol/L    POCT Ionized Calicum, Venous 1.18 1.10 - 1.33 mmol/L    POCT Glucose, Venous 127 (H) 74 - 99 mg/dL    POCT Lactate, Venous 1.3 0.4 - 2.0 mmol/L    POCT Base Excess, Venous 2.8 -2.0 - 3.0 mmol/L    POCT HCO3 Calculated, Venous 28.5 (H) 22.0 - 26.0 mmol/L    POCT Hemoglobin, Venous 17.7 (H) 12.0 - 16.0 g/dL    POCT Anion Gap, Venous 11.0 10.0 - 25.0 mmol/L    Patient Temperature      FiO2 28 %    Apparatus CANNULA    Influenza A, and B PCR   Result Value Ref Range    Flu A Result Not Detected Not Detected    Flu B Result Not Detected Not Detected   SARS-CoV-2 RT PCR   Result Value Ref Range    Coronavirus 2019, PCR Not Detected Not Detected   Troponin I, High Sensitivity, Initial   Result Value Ref Range    Troponin I, High Sensitivity 64 (HH) 0 - 13 ng/L          Assessment/Plan   Principal Problem:    Hypertension secondary to other renal disorders      78f Sister living at [facility] with HTN, HLD, hx breast cancer 1991 s/p mastectomies, ESRD on HD MWF, HEATH on CPAP, chronic respiratory failure on 2L NC, essential thrombocytosis, HFpEF 55-60 EF 6/2023, MDD, chronic anemia, who was brought in by ambulance after being found with respiratory distress, admitted to ICU for hypertensive emergency started on NTG drip in ED.     Neuro/Psych:  #concern for hypertensive emergency   -baseline mentation on admission with no vision change though has N/V   -CT head to rule out brain bleed     Cardiovascular:  #Concern for hypertensive emergency   #elevated troponin  -BP 210s systolic in /109 highest with EMS. shortness of breath, chest pressure and  N/V as symptoms. May be volume overload and require dialysis with mild JVD, though CXR no acute process.  -mild elevation in troponin on admission in setting of ESRD, trend. Get EKG. Consider cardiology consult   -hold home antihypertensives: carvedilol 25 bid, losartan 100 daily. Unsure if pt still takes amlodipine 10 daily, unlikely takes metoprolol tartrate 100 bid. Consider calling   -NTG drip in ED, nicardipine drip on admission to ICU, goal BP 160s systolic within a few hours. Consider restarting oral antihypertensives then     Pulmonary:  #dypsnea   #chronic respiratory failure   -2L NC at home  -CXR no acute process. Is 2L 100% SpO2 on admission though feels dyspneic. This improves when O2 bumped up a bit.   -Continue ~3L for comfort, monitor for hypoxemia, consider ABG to look at PaO2     Gastrointestinal:  #Elevated LFTs, chronic   -daily LFTs, get GGT     #GERD  -po panroprazole 20 prn     Renal:  #ESRD on HD   -pt states is on “4 cups” fluid restriction  -concern for volume overload with mild JVD though no acute findings on CXR   -nephrology consult for HD     Endocrine:  -no acute issues     Infectious Disease:  -no acute issues     Hematology/Oncology:  #chronic anemia  -related to ESRD and DAVID   -took PO iron and gets Procrit MWF, holding. Hgb 11.4 on admission. Consider adjusting Procrit for dosing on discharge for target hgb lower than 11, blood pressure may improve.     #essential thrombocytosis  -hold hydroxyurea, unclear if pt still takes anagrelide but hold regardless     Musculoskeletal/Skin:  #chronic arthritis and lower back pain  -tylenol prn     Fluids: PO, 1L fluid restriction   Electrolytes: replete prn   Nutrition: renal diet, 1L fluid restriction   VTE prophylaxis: heparin subq   Code Status: DNR and DNI discussed on admission   Lines/Tubes: PIV   DRIPS: nicardipine drip    Disposition: Pt admitted to ICU for Bp control on an antihypertensive drip, estimated LOS > 48h              Junyu  Yared, DO

## 2023-12-04 NOTE — PROGRESS NOTES
Joyce Bethea is a 78 y.o. female on day 0 of admission presenting with Hypertension secondary to other renal disorders.      Subjective   Pt resting comfortably in bed. States resolution of SOB/dull chest pain since last night. Has a mild HA currently but no visual changes. No f/c, n/v, chest pain, abdominal pain, or leg edema. On nicardipine drip at time of interview with BP of 163/54.       Objective     Last Recorded Vitals  /87   Pulse 77   Temp 37.1 °C (98.8 °F)   Resp 11   Wt 75.7 kg (166 lb 14.2 oz)   SpO2 98%   Intake/Output last 3 Shifts:    Intake/Output Summary (Last 24 hours) at 12/4/2023 1054  Last data filed at 12/4/2023 1030  Gross per 24 hour   Intake 200 ml   Output --   Net 200 ml       Admission Weight  Weight: 76.6 kg (168 lb 14 oz) (12/03/23 2250)    Daily Weight  12/04/23 : 75.7 kg (166 lb 14.2 oz)    Image Results  XR chest 1 view  Narrative: Interpreted By:  Margaret Weinstein,   STUDY:  XR CHEST 1 VIEW;  12/3/2023 11:16 pm      INDICATION:  Signs/Symptoms:sob.      COMPARISON:  06/04/2023      ACCESSION NUMBER(S):  [id number]      ORDERING CLINICIAN:  SHERON ARANDA      FINDINGS:  Bilateral axillary surgical clips.      CARDIOMEDIASTINAL SILHOUETTE:  Stable enlarged cardiac silhouette.      LUNGS:  No pulmonary consolidation, pleural effusion or pneumothorax. Mild  right basilar atelectasis.      ABDOMEN:  No remarkable upper abdominal findings.      BONES:  No acute osseous abnormality. Postsurgical changes of distal right  clavicle resection and anchors in the humeral head.      Impression: Mild right basilar atelectasis.      MACRO:  None      Signed by: Margaret Weinstein 12/3/2023 11:51 PM  Dictation workstation:   [id number]      Physical Exam  Constitutional:       General: She is not in acute distress.     Appearance: Normal appearance.   HENT:      Head: Normocephalic and atraumatic.   Eyes:      Conjunctiva/sclera: Conjunctivae normal.   Cardiovascular:      Rate and  Rhythm: Normal rate and regular rhythm.      Heart sounds: No murmur heard.     No friction rub. No gallop.   Pulmonary:      Effort: Pulmonary effort is normal. No respiratory distress.      Breath sounds: No wheezing, rhonchi or rales.   Abdominal:      General: Abdomen is flat. Bowel sounds are normal.      Palpations: Abdomen is soft.      Tenderness: There is no abdominal tenderness.   Musculoskeletal:         General: No signs of injury.      Right lower leg: No edema.      Left lower leg: No edema.   Skin:     General: Skin is warm and dry.      Findings: No rash.   Neurological:      General: No focal deficit present.      Mental Status: She is alert and oriented to person, place, and time. Mental status is at baseline.   Psychiatric:         Mood and Affect: Mood normal.         Behavior: Behavior normal.         Relevant Results  Scheduled medications  amitriptyline, 10 mg, oral, Nightly  [Held by provider] anagrelide, 1 mg, oral, BID  B complex-vitamin C-folic acid, 1 capsule, oral, Daily  biotin, 1 mg, oral, Daily  calcitriol, 0.25 mcg, oral, Every Mon/Wed/Fri  calcium carbonate, 1,000 mg, oral, TID with meals  carvedilol, 25 mg, oral, BID  cholecalciferol, 2,000 Units, oral, Daily  fluticasone, 2 spray, Each Nostril, Daily  heparin (porcine), 5,000 Units, subcutaneous, q8h  hydroxyurea, 500 mg, oral, Once per day on Mon Wed Fri  losartan, 100 mg, oral, Daily  saccharomyces boulardii, 250 mg, oral, Daily  sennosides-docusate sodium, 1 tablet, oral, Nightly      Continuous medications     PRN medications  PRN medications: acetaminophen, gabapentin, guaiFENesin, ipratropium-albuteroL, loratadine, meclizine, ondansetron, oxygen, pantoprazole, sodium chloride    Results for orders placed or performed during the hospital encounter of 12/03/23 (from the past 24 hour(s))   CBC and Auto Differential   Result Value Ref Range    WBC 10.5 4.4 - 11.3 x10*3/uL    nRBC 0.0 0.0 - 0.0 /100 WBCs    RBC 3.44 (L) 4.00 -  5.20 x10*6/uL    Hemoglobin 11.4 (L) 12.0 - 16.0 g/dL    Hematocrit 35.1 (L) 36.0 - 46.0 %     (H) 80 - 100 fL    MCH 33.1 26.0 - 34.0 pg    MCHC 32.5 32.0 - 36.0 g/dL    RDW 15.0 (H) 11.5 - 14.5 %    Platelets 390 150 - 450 x10*3/uL    Neutrophils % 74.5 40.0 - 80.0 %    Immature Granulocytes %, Automated 0.6 0.0 - 0.9 %    Lymphocytes % 11.6 13.0 - 44.0 %    Monocytes % 8.5 2.0 - 10.0 %    Eosinophils % 4.3 0.0 - 6.0 %    Basophils % 0.5 0.0 - 2.0 %    Neutrophils Absolute 7.85 (H) 1.60 - 5.50 x10*3/uL    Immature Granulocytes Absolute, Automated 0.06 0.00 - 0.50 x10*3/uL    Lymphocytes Absolute 1.22 0.80 - 3.00 x10*3/uL    Monocytes Absolute 0.89 (H) 0.05 - 0.80 x10*3/uL    Eosinophils Absolute 0.45 (H) 0.00 - 0.40 x10*3/uL    Basophils Absolute 0.05 0.00 - 0.10 x10*3/uL   Comprehensive metabolic panel   Result Value Ref Range    Glucose 128 (H) 74 - 99 mg/dL    Sodium 140 136 - 145 mmol/L    Potassium 5.0 3.5 - 5.3 mmol/L    Chloride 97 (L) 98 - 107 mmol/L    Bicarbonate 25 21 - 32 mmol/L    Anion Gap 23 (H) 10 - 20 mmol/L    Urea Nitrogen 77 (H) 6 - 23 mg/dL    Creatinine 7.81 (H) 0.50 - 1.05 mg/dL    eGFR 5 (L) >60 mL/min/1.73m*2    Calcium 9.7 8.6 - 10.3 mg/dL    Albumin 3.7 3.4 - 5.0 g/dL    Alkaline Phosphatase 236 (H) 33 - 136 U/L    Total Protein 6.6 6.4 - 8.2 g/dL    AST 50 (H) 9 - 39 U/L    Bilirubin, Total 0.4 0.0 - 1.2 mg/dL    ALT 51 (H) 7 - 45 U/L   Troponin I, High Sensitivity   Result Value Ref Range    Troponin I, High Sensitivity 37 (H) 0 - 13 ng/L   B-Type Natriuretic Peptide   Result Value Ref Range    BNP 1,761 (H) 0 - 99 pg/mL   Blood Gas Venous Full Panel   Result Value Ref Range    POCT pH, Venous 7.40 7.33 - 7.43 pH    POCT pCO2, Venous 46 41 - 51 mm Hg    POCT pO2, Venous 51 (H) 35 - 45 mm Hg    POCT SO2, Venous 79 (H) 45 - 75 %    POCT Oxy Hemoglobin, Venous 77.4 (H) 45.0 - 75.0 %    POCT Hematocrit Calculated, Venous 53.0 (H) 36.0 - 46.0 %    POCT Sodium, Venous 133 (L) 136 -  145 mmol/L    POCT Potassium, Venous 4.7 3.5 - 5.3 mmol/L    POCT Chloride, Venous 98 98 - 107 mmol/L    POCT Ionized Calicum, Venous 1.18 1.10 - 1.33 mmol/L    POCT Glucose, Venous 127 (H) 74 - 99 mg/dL    POCT Lactate, Venous 1.3 0.4 - 2.0 mmol/L    POCT Base Excess, Venous 2.8 -2.0 - 3.0 mmol/L    POCT HCO3 Calculated, Venous 28.5 (H) 22.0 - 26.0 mmol/L    POCT Hemoglobin, Venous 17.7 (H) 12.0 - 16.0 g/dL    POCT Anion Gap, Venous 11.0 10.0 - 25.0 mmol/L    Patient Temperature      FiO2 28 %    Apparatus CANNULA    Influenza A, and B PCR   Result Value Ref Range    Flu A Result Not Detected Not Detected    Flu B Result Not Detected Not Detected   SARS-CoV-2 RT PCR   Result Value Ref Range    Coronavirus 2019, PCR Not Detected Not Detected   Troponin I, High Sensitivity, Initial   Result Value Ref Range    Troponin I, High Sensitivity 64 (HH) 0 - 13 ng/L   Troponin I, High Sensitivity, Initial   Result Value Ref Range    Troponin I, High Sensitivity 62 (HH) 0 - 13 ng/L   Troponin, High Sensitivity, 1 Hour   Result Value Ref Range    Troponin I, High Sensitivity 67 (HH) 0 - 13 ng/L   Comprehensive metabolic panel   Result Value Ref Range    Glucose 111 (H) 74 - 99 mg/dL    Sodium 138 136 - 145 mmol/L    Potassium 5.4 (H) 3.5 - 5.3 mmol/L    Chloride 96 (L) 98 - 107 mmol/L    Bicarbonate 26 21 - 32 mmol/L    Anion Gap 21 (H) 10 - 20 mmol/L    Urea Nitrogen 81 (H) 6 - 23 mg/dL    Creatinine 8.69 (H) 0.50 - 1.05 mg/dL    eGFR 4 (L) >60 mL/min/1.73m*2    Calcium 9.1 8.6 - 10.3 mg/dL    Albumin 3.7 3.4 - 5.0 g/dL    Alkaline Phosphatase 233 (H) 33 - 136 U/L    Total Protein 5.9 (L) 6.4 - 8.2 g/dL    AST 33 9 - 39 U/L    Bilirubin, Total 0.3 0.0 - 1.2 mg/dL    ALT 46 (H) 7 - 45 U/L   Magnesium   Result Value Ref Range    Magnesium 2.38 1.60 - 2.40 mg/dL   Phosphorus   Result Value Ref Range    Phosphorus 4.7 2.5 - 4.9 mg/dL   Troponin I, High Sensitivity   Result Value Ref Range    Troponin I, High Sensitivity 67 (HH) 0  - 13 ng/L   Gamma-Glutamyl Transferase   Result Value Ref Range     (H) 5 - 55 U/L   CBC   Result Value Ref Range    WBC 10.8 4.4 - 11.3 x10*3/uL    nRBC 0.0 0.0 - 0.0 /100 WBCs    RBC 3.26 (L) 4.00 - 5.20 x10*6/uL    Hemoglobin 10.8 (L) 12.0 - 16.0 g/dL    Hematocrit 34.1 (L) 36.0 - 46.0 %     (H) 80 - 100 fL    MCH 33.1 26.0 - 34.0 pg    MCHC 31.7 (L) 32.0 - 36.0 g/dL    RDW 14.9 (H) 11.5 - 14.5 %    Platelets 381 150 - 450 x10*3/uL     XR chest 1 view    Result Date: 12/3/2023  Interpreted By:  Margaret Weinstein, STUDY: XR CHEST 1 VIEW;  12/3/2023 11:16 pm   INDICATION: Signs/Symptoms:sob.   COMPARISON: 06/04/2023   ACCESSION NUMBER(S): [id number]   ORDERING CLINICIAN: SHERON ARANDA   FINDINGS: Bilateral axillary surgical clips.   CARDIOMEDIASTINAL SILHOUETTE: Stable enlarged cardiac silhouette.   LUNGS: No pulmonary consolidation, pleural effusion or pneumothorax. Mild right basilar atelectasis.   ABDOMEN: No remarkable upper abdominal findings.   BONES: No acute osseous abnormality. Postsurgical changes of distal right clavicle resection and anchors in the humeral head.       Mild right basilar atelectasis.   MACRO: None   Signed by: Margaret Weinstein 12/3/2023 11:51 PM Dictation workstation:   [id number]         Assessment/Plan   This patient currently has cardiac telemetry ordered; if you would like to modify or discontinue the telemetry order, click here to go to the orders activity to modify/discontinue the order.    78f Sister living at [place] with HTN, HLD, hx breast cancer 1991 s/p mastectomies, ESRD on HD MWF, HEATH on CPAP, chronic respiratory failure on 2L NC, essential thrombocytosis, HFpEF 55-60 EF 6/2023, MDD, chronic anemia, who was brought in by ambulance after being found with respiratory distress, admitted to ICU for hypertensive emergency started on NTG drip in ED.      Neuro/Psych:  #concern for hypertensive emergency   -baseline mentation on admission with no vision change and  mild HA  -resolution of N/V  -no current indication for CT head due to resolving symptoms over night  -continue home amitriptyline for insomnia     Cardiovascular:  #Concern for hypertensive emergency   #elevated troponin  -BP 210s systolic in /109 highest with EMS. shortness of breath, chest pressure and N/V as symptoms (resolved). May be volume overload and require dialysis, CXR no acute process  -mild elevation in troponin on admission in setting of ESRD, trended from 64->62->67, without complaint of chest pain/SOB on 12/4. EKG NSR  -s/p NTG drip in ED, nicardipine drip on admission to ICU 12/4 7AM with SBP <180  -Discontinued nicardipine drip 12/4, resumed home antihypertensives: carvedilol 25 bid, losartan 100 daily    #CHF  -HFpEF 55-60 EF 6/2023   -elevated BNP on admission without signs of volume overload on physical exam  -CXR without pleural effusion  -will monitor fluid status, dialysis to resumed today (MWF)     Pulmonary:  #dypsnea (resolved)  #chronic respiratory failure   -2L NC at home, continue on admission  -CXR no acute process. Is 2L 100% SpO2 currently  -Continue home CPAP, PEEP 15     Gastrointestinal:  #Elevated LFTs, chronic   -daily LFTs  -Resolving on 12/4 with ALT 51->46 and AST 50->33     #GERD  -continue home po panroprazole 20 prn   -continue home probiotic and tums with meals     Renal:  #ESRD on HD   -1L fluid restriction diet  -HD today (receives MWF)  -nephrology consulted, appreciate recs     Endocrine:  -no acute issues      Infectious Disease:  -no acute issues      Hematology/Oncology:  #chronic anemia  -related to ESRD and DAVID   -gets Procrit MWF, holding. Hgb 11.4 on admission, stable. No longer on oral iron.     #essential thrombocytosis  -Continue home hydroxyurea, hold anagrelide     Musculoskeletal/Skin:  #chronic arthritis and lower back pain  -tylenol prn      Fluids: PO, 1L fluid restriction   Electrolytes: replete prn   Nutrition: renal diet, 1L fluid restriction    VTE prophylaxis: heparin subq   Code Status: DNR and DNI discussed on admission   Lines/Tubes: PIV   DRIPS: none     Disposition: Pt admitted to ICU for Bp control on an antihypertensive drip, transfer orders to floor placed, can transfer following dialysis.      Principal Problem:    Hypertension secondary to other renal disorders       Dora Maciel MD

## 2023-12-04 NOTE — ED PROVIDER NOTES
CC: Shortness of Breath (Pt c/o SOB and had some emesis in the squad on the ride here.)     HPI:  Patient is an 78-year-old female who has a history of end-stage renal disease on dialysis, Monday Wednesday Friday who had a full session on Friday presenting to the emergency department with acute shortness of breath.  Patient states that she was at her nursing facility when she became acutely short of breath and felt like she was not getting enough oxygen.  Patient states she has had a very hard time controlling her blood pressure on dialysis.  She endorses some nausea with one episode of vomiting in route but denies chest pain.    Records Reviewed:  Recent available ED and inpatient notes reviewed in EMR.    PMHx/PSHx:  Per HPI.   - has a past medical history of Body mass index (BMI) 31.0-31.9, adult, Encounter for follow-up examination after completed treatment for conditions other than malignant neoplasm, Other conditions influencing health status, Other specified disorders of bone, unspecified site, Personal history of malignant neoplasm of breast, Personal history of malignant neoplasm, unspecified, Personal history of other diseases of the circulatory system, Personal history of other diseases of the digestive system, Personal history of other diseases of the musculoskeletal system and connective tissue, Personal history of other diseases of the musculoskeletal system and connective tissue, Personal history of other diseases of the nervous system and sense organs, Personal history of other diseases of urinary system, Respiratory failure, unspecified with hypoxia (CMS/HCC), Shortness of breath, Unspecified cataract, and Unspecified fracture of unspecified forearm, sequela.  - has a past surgical history that includes Colonoscopy (02/05/2014); Tonsillectomy (02/05/2014); Mastectomy (02/05/2014); Rotator cuff repair (04/01/2014); Knee arthroscopy w/ meniscal repair (04/01/2014); Other surgical history (09/21/2021);  MR angio head wo IV contrast (2/23/2019); MR angio neck wo IV contrast (2/23/2019); and IR CVC tunneled (10/12/2021).  - has Abnormal renal function test; Asymptomatic peripheral vascular disease (CMS/HCC); Benign essential hypertension; Bilateral partial vocal cord paralysis; Chronic diastolic heart failure (CMS/HCC); Chronic respiratory failure with hypoxia, on home oxygen therapy (CMS/HCC); Coagulopathy (CMS/HCC); Current mild episode of major depressive disorder without prior episode (CMS/HCC); Depression, major, in partial remission (CMS/HCC); Dyspnea; Epistaxis; ESRD (end stage renal disease) on dialysis (CMS/HCC); Greater trochanteric bursitis of both hips; Mixed hyperlipidemia; Hoarseness of voice; Muscle tension dysphonia; Non-seasonal allergic rhinitis; HEATH on CPAP; Osteoporosis; Palpitations; Primary osteoarthritis of both first carpometacarpal joints; Primary osteoarthritis of both hands; Primary osteoarthritis of both knees; Chronic kidney disease with active medical management without dialysis; Stage 4 chronic kidney disease (CMS/HCC); Essential thrombocythemia (CMS/HCC); History of malignant neoplasm of breast; and Hypertension secondary to other renal disorders on their problem list.    Medications:  Reviewed in EMR. See EMR for complete list of medications and doses.    Allergies:  Bee pollen, House dust, House dust mite, and Adhesive tape-silicones    Social History:  - Tobacco:  reports that she has never smoked. She has never used smokeless tobacco.   - Alcohol:  reports no history of alcohol use.   - Illicit Drugs:  reports no history of drug use.     ROS:  Per HPI.       ???????????????????????????????????????????????????????????????  Triage Vitals:  T 36 °C (96.8 °F)  HR 80  BP (!) 199/78  RR 16  O2 96 % Supplemental oxygen    Physical Exam  ???????????????????????????????????????????????????????????????  GEN: Chronically ill appearing, no acute distress  HEAD: atraumatic  EYES: PERRL,  EOMI, no scleral icterus  ENT: mmm, no rhinorrhea, uvula midline  NECK: no JVD  CVS/CHEST: reg rate, nl rhythm  PULM: Diminished significantly bilaterally.  Bilateral mastectomies.  GI: soft, NT/ND, no rebound or guarding   EXT: Right upper extremity fistula without surrounding erythema.  Palpable thrill.  NEURO: Awake and alert, Strength and sensation is equal in b/l upper and lower extremities, normal ambulation, no focal sensory deficits  SKIN: warm, dry  PSYCH: AAOx3 answers questions appropriately    EKG:  EKG read by me reviewed by me as normal sinus rhythm at 73 bpm.  Normal axis.  Normal intervals.  No ST segment elevation or depression.  Repeat EKG relatively unchanged.    Assessment and Plan:  78-year-old female presents to the emergency department shortness of breath.  Bedside ultrasound shows pleural effusion and scattered B-lines.  Patient has a history of end-stage renal disease and likely requires dialysis.  Markedly hypertensive requiring high-dose nitroglycerin drip and therefore admitted to the ICU for dialysis and continuation of care.  Discussed with patient and sister at bedside who expresses understanding.  Patient does wear CPAP at night and will be placed on CPAP for symptomatic management.    ED Course:  Diagnoses as of 12/04/23 1535   Hypertension secondary to other renal disorders   Shortness of breath   Pleural effusion   ESRD needing dialysis (CMS/[facility])       Social Determinants Limiting Care:  None identified    Disposition:  admitted    Lashawn Gordon DO      Critical Care    Performed by: Lashawn Gordon DO  Authorized by: Lashawn Gordon DO    Critical care provider statement:     Critical care time (minutes):  35    Critical care time was exclusive of:  Separately billable procedures and treating other patients    Critical care was necessary to treat or prevent imminent or life-threatening deterioration of the following conditions:  Respiratory failure and cardiac failure     Critical care was time spent personally by me on the following activities:  Blood draw for specimens, development of treatment plan with patient or surrogate, discussions with consultants, examination of patient, ordering and review of laboratory studies, ordering and review of radiographic studies, re-evaluation of patient's condition and review of old charts    I assumed direction of critical care for this patient from another provider in my specialty: no      Care discussed with: admitting provider     ? [employer] last updated 12/4/2023 3:35 PM        Lashawn Gordon,   12/04/23 1539       Lashawn Gordon,   12/04/23 1540

## 2023-12-04 NOTE — PROGRESS NOTES
12/04/23 5560   Discharge Planning   Living Arrangements Other (Comment)  ([facility])   Support Systems [employer]/[facility] community;Friends/neighbors   Assistance Needed Patient is from [facility], requiring O2 at 4LPM via NC and is currently receiving dialysis at bedside.   Type of Residence Nursing home/residential care   Care Facility Name [facility]   Who is requesting discharge planning? Provider   Home or Post Acute Services Post acute facilities (Rehab/SNF/etc)   Type of Post Acute Facility Services Other (Comment)  ([facility])   Patient expects to be discharged to: [facility]   Does the patient need discharge transport arranged? Yes   RoundTrip coordination needed? Yes   Has discharge transport been arranged? No

## 2023-12-04 NOTE — CONSULTS
CONSULT: Nephrology SERVICE    SERVICE DATE: 12/4/2023   SERVICE TIME:  10:09 AM    REASON FOR CONSULT: End-stage renal disease management  REQUESTING PHYSICIAN: Dr. Rosado  PRIMARY CARE PHYSICIAN: FANNIE Galvin    ASSESSMENT AND PLAN  78-year-old dialysis patient admitted with dyspnea and hypertension.  1.  Hypertensive urgency  2.  End-stage renal disease  3.  Hyperkalemia  4.  Anemia of chronic kidney disease    Dialysis today for 4 hours and attempt 3 L volume removal.  I will resume carvedilol 25 mg p.o. twice daily, losartan 100 mg daily.  Wean the nicardipine drip as able.  We may be able to do this during this treatment of dialysis today.  Dialysis will mitigate to the hyperkalemia.  The hemoglobin is at goal for now.  Defer any erythropoietin stimulating agents at present.  Supportive care per ICU team otherwise.  I will follow her.     SUBJECTIVE  Ms. Bethea is a 78 y.o. woman on dialysis with a history of breast cancer admitted with dyspnea and hypertension, consulted for dialysis management.  This woman regularly dialyzes on Mondays, Wednesdays, Fridays.  She is compliant with her treatments.  She did have treatment 3 days ago according to her usual schedule.  She tells me that only 1 L of volume was removed.  If they remove more than 2 L, she cramps significantly.  Her blood pressure control has been a little bit challenging.  She has lymphedema and cannot tolerate a blood pressure cuff around her humerus on her nondialysis arm, therefore it is a wrist cuff.  She has been managed with carvedilol 25 mg p.o. twice daily and losartan 100 mg daily.  Previously she was on amlodipine, but she had hypotension and this was scaled back.  Blood pressure had been improving on this regimen, but evidently last night her blood pressure was over 220 systolic.  She says that at 7:00 it was checked and it was 130 systolic.  She is developed some sudden onset dyspnea.  She has no chest pain.  She has  been commenced on a nicardipine drip since being hospitalized.  Her blood pressure has improved considerably and I evaluated her just as she was about to be cannulated for dialysis.  She is done very well with her fluid restriction, which she manages rigidly.  Her weight has been fairly stable, she has gained a little nonwater weight.    PAST MEDICAL HISTORY:   Past Medical History:   Diagnosis Date    Body mass index (BMI) 31.0-31.9, adult 04/12/2019    BMI 31.0-31.9,adult    Encounter for follow-up examination after completed treatment for conditions other than malignant neoplasm 11/30/2017    Hospital discharge follow-up    Other conditions influencing health status 03/12/2019    History of cough    Other specified disorders of bone, unspecified site 03/11/2015    Abnormal bone formation    Personal history of malignant neoplasm of breast 02/05/2014    Personal history of breast cancer    Personal history of malignant neoplasm, unspecified     History of malignant neoplasm    Personal history of other diseases of the circulatory system     History of hypertension    Personal history of other diseases of the digestive system     History of gastroesophageal reflux (GERD)    Personal history of other diseases of the musculoskeletal system and connective tissue 01/21/2016    History of trigger finger    Personal history of other diseases of the musculoskeletal system and connective tissue     History of arthritis    Personal history of other diseases of the nervous system and sense organs     History of sleep apnea    Personal history of other diseases of urinary system     History of kidney disease    Respiratory failure, unspecified with hypoxia (CMS/[facility]) 12/29/2017    Respiratory failure with hypoxia    Shortness of breath 10/06/2017    Exertional shortness of breath    Unspecified cataract     Cataracts, bilateral    Unspecified fracture of unspecified forearm, sequela 10/04/2018    Forearm fracture, sequela      PAST SURGICAL HISTORY:   Past Surgical History:   Procedure Laterality Date    COLONOSCOPY  02/05/2014    Colonoscopy (Fiberoptic)    IR CVC TUNNELED  10/12/2021    IR CVC TUNNELED 10/12/2021 [facility]    KNEE ARTHROSCOPY W/ MENISCAL REPAIR  04/01/2014    Knee Arthroscopy With Medial Meniscus Repair    MASTECTOMY  02/05/2014    Breast Surgery Mastectomy    MR HEAD ANGIO WO IV CONTRAST  2/23/2019    MR HEAD ANGIO WO IV CONTRAST 2/23/2019 [facility]    MR NECK ANGIO WO IV CONTRAST  2/23/2019    MR NECK ANGIO WO IV CONTRAST 2/23/2019 [facility]    OTHER SURGICAL HISTORY  09/21/2021    Arteriovenous fistula creation procedure    ROTATOR CUFF REPAIR  04/01/2014    Rotator Cuff Repair    TONSILLECTOMY  02/05/2014    Tonsillectomy     FAMILY HISTORY:   Family History   Problem Relation Name Age of Onset    Hypertension Mother      Leukemia Mother      Osteoarthritis Mother      Colon cancer Father      Diabetes Father      Hypertension Father      Osteoarthritis Sister      Osteoarthritis Other Aunt     Stroke Other Aunt     Stroke Other Uncle     Breast cancer Other Family History      SOCIAL HISTORY:   Social History     Tobacco Use    Smoking status: Never    Smokeless tobacco: Never   Vaping Use    Vaping Use: Never used   Substance Use Topics    Alcohol use: Never    Drug use: Never     MEDICATIONS:  amitriptyline, 10 mg, oral, Nightly  [Held by provider] anagrelide, 1 mg, oral, BID  B complex-vitamin C-folic acid, 1 capsule, oral, Daily  biotin, 1 mg, oral, Daily  calcitriol, 0.25 mcg, oral, Every Mon/Wed/Fri  carvedilol, 25 mg, oral, BID  cholecalciferol, 2,000 Units, oral, Daily  fluticasone, 2 spray, Each Nostril, Daily  heparin (porcine), 5,000 Units, subcutaneous, q8h  [Held by provider] hydroxyurea, 500 mg, oral, Once per day on Mon Wed Fri  losartan, 100 mg, oral, Daily  sennosides-docusate sodium, 1 tablet, oral, Nightly           PRN medications: acetaminophen, gabapentin,  guaiFENesin, ipratropium-albuteroL, loratadine, meclizine, ondansetron, oxygen, pantoprazole, sodium chloride   CURRENT ALLERGIES:   Allergies as of 12/03/2023 - Reviewed 12/03/2023   Allergen Reaction Noted    Bee pollen Unknown 08/19/2023    House dust Unknown 08/19/2023    House dust mite Unknown 08/19/2023    Adhesive tape-silicones Rash 08/19/2023       COMPLETE REVIEW OF SYSTEMS:    Full ROS was negative unless mentioned above    OBJECTIVE  PHYSICAL EXAM  Heart Rate:  [65-80]   Temp:  [36 °C (96.8 °F)-37.1 °C (98.8 °F)]   Resp:  [13-26]   BP: (145-227)/()   Height:  [152.4 cm (5')]   Weight:  [75.5 kg (166 lb 7.2 oz)-76.6 kg (168 lb 14 oz)]   SpO2:  [95 %-100 %]    Body mass index is 32.59 kg/m².  Chronically ill and elderly obese white woman  Somewhat pale skin  Moist mucosa  She is somewhat dyspneic in between sentences  Hearing intact  Normal S1/normal S2  Lungs are clear to auscultation bilaterally  Abdomen is soft, nondistended, nontender, positive bowel sounds  No Johnson catheter in place, no suprapubic tenderness to palpation  No extremity edema  Moves 4 limbs spontaneously  No obvious joint deformities  No lymphadenopathy    DATA:   Labs:  Results for orders placed or performed during the hospital encounter of 12/03/23 (from the past 96 hour(s))   CBC and Auto Differential   Result Value Ref Range    WBC 10.5 4.4 - 11.3 x10*3/uL    nRBC 0.0 0.0 - 0.0 /100 WBCs    RBC 3.44 (L) 4.00 - 5.20 x10*6/uL    Hemoglobin 11.4 (L) 12.0 - 16.0 g/dL    Hematocrit 35.1 (L) 36.0 - 46.0 %     (H) 80 - 100 fL    MCH 33.1 26.0 - 34.0 pg    MCHC 32.5 32.0 - 36.0 g/dL    RDW 15.0 (H) 11.5 - 14.5 %    Platelets 390 150 - 450 x10*3/uL    Neutrophils % 74.5 40.0 - 80.0 %    Immature Granulocytes %, Automated 0.6 0.0 - 0.9 %    Lymphocytes % 11.6 13.0 - 44.0 %    Monocytes % 8.5 2.0 - 10.0 %    Eosinophils % 4.3 0.0 - 6.0 %    Basophils % 0.5 0.0 - 2.0 %    Neutrophils Absolute 7.85 (H) 1.60 - 5.50 x10*3/uL     Immature Granulocytes Absolute, Automated 0.06 0.00 - 0.50 x10*3/uL    Lymphocytes Absolute 1.22 0.80 - 3.00 x10*3/uL    Monocytes Absolute 0.89 (H) 0.05 - 0.80 x10*3/uL    Eosinophils Absolute 0.45 (H) 0.00 - 0.40 x10*3/uL    Basophils Absolute 0.05 0.00 - 0.10 x10*3/uL   Comprehensive metabolic panel   Result Value Ref Range    Glucose 128 (H) 74 - 99 mg/dL    Sodium 140 136 - 145 mmol/L    Potassium 5.0 3.5 - 5.3 mmol/L    Chloride 97 (L) 98 - 107 mmol/L    Bicarbonate 25 21 - 32 mmol/L    Anion Gap 23 (H) 10 - 20 mmol/L    Urea Nitrogen 77 (H) 6 - 23 mg/dL    Creatinine 7.81 (H) 0.50 - 1.05 mg/dL    eGFR 5 (L) >60 mL/min/1.73m*2    Calcium 9.7 8.6 - 10.3 mg/dL    Albumin 3.7 3.4 - 5.0 g/dL    Alkaline Phosphatase 236 (H) 33 - 136 U/L    Total Protein 6.6 6.4 - 8.2 g/dL    AST 50 (H) 9 - 39 U/L    Bilirubin, Total 0.4 0.0 - 1.2 mg/dL    ALT 51 (H) 7 - 45 U/L   Troponin I, High Sensitivity   Result Value Ref Range    Troponin I, High Sensitivity 37 (H) 0 - 13 ng/L   B-Type Natriuretic Peptide   Result Value Ref Range    BNP 1,761 (H) 0 - 99 pg/mL   Blood Gas Venous Full Panel   Result Value Ref Range    POCT pH, Venous 7.40 7.33 - 7.43 pH    POCT pCO2, Venous 46 41 - 51 mm Hg    POCT pO2, Venous 51 (H) 35 - 45 mm Hg    POCT SO2, Venous 79 (H) 45 - 75 %    POCT Oxy Hemoglobin, Venous 77.4 (H) 45.0 - 75.0 %    POCT Hematocrit Calculated, Venous 53.0 (H) 36.0 - 46.0 %    POCT Sodium, Venous 133 (L) 136 - 145 mmol/L    POCT Potassium, Venous 4.7 3.5 - 5.3 mmol/L    POCT Chloride, Venous 98 98 - 107 mmol/L    POCT Ionized Calicum, Venous 1.18 1.10 - 1.33 mmol/L    POCT Glucose, Venous 127 (H) 74 - 99 mg/dL    POCT Lactate, Venous 1.3 0.4 - 2.0 mmol/L    POCT Base Excess, Venous 2.8 -2.0 - 3.0 mmol/L    POCT HCO3 Calculated, Venous 28.5 (H) 22.0 - 26.0 mmol/L    POCT Hemoglobin, Venous 17.7 (H) 12.0 - 16.0 g/dL    POCT Anion Gap, Venous 11.0 10.0 - 25.0 mmol/L    Patient Temperature      FiO2 28 %    Apparatus CANNULA     Influenza A, and B PCR   Result Value Ref Range    Flu A Result Not Detected Not Detected    Flu B Result Not Detected Not Detected   SARS-CoV-2 RT PCR   Result Value Ref Range    Coronavirus 2019, PCR Not Detected Not Detected   Troponin I, High Sensitivity, Initial   Result Value Ref Range    Troponin I, High Sensitivity 64 (HH) 0 - 13 ng/L   Troponin I, High Sensitivity, Initial   Result Value Ref Range    Troponin I, High Sensitivity 62 (HH) 0 - 13 ng/L   Troponin, High Sensitivity, 1 Hour   Result Value Ref Range    Troponin I, High Sensitivity 67 (HH) 0 - 13 ng/L   Comprehensive metabolic panel   Result Value Ref Range    Glucose 111 (H) 74 - 99 mg/dL    Sodium 138 136 - 145 mmol/L    Potassium 5.4 (H) 3.5 - 5.3 mmol/L    Chloride 96 (L) 98 - 107 mmol/L    Bicarbonate 26 21 - 32 mmol/L    Anion Gap 21 (H) 10 - 20 mmol/L    Urea Nitrogen 81 (H) 6 - 23 mg/dL    Creatinine 8.69 (H) 0.50 - 1.05 mg/dL    eGFR 4 (L) >60 mL/min/1.73m*2    Calcium 9.1 8.6 - 10.3 mg/dL    Albumin 3.7 3.4 - 5.0 g/dL    Alkaline Phosphatase 233 (H) 33 - 136 U/L    Total Protein 5.9 (L) 6.4 - 8.2 g/dL    AST 33 9 - 39 U/L    Bilirubin, Total 0.3 0.0 - 1.2 mg/dL    ALT 46 (H) 7 - 45 U/L   Magnesium   Result Value Ref Range    Magnesium 2.38 1.60 - 2.40 mg/dL   Phosphorus   Result Value Ref Range    Phosphorus 4.7 2.5 - 4.9 mg/dL   Troponin I, High Sensitivity   Result Value Ref Range    Troponin I, High Sensitivity 67 (HH) 0 - 13 ng/L   Gamma-Glutamyl Transferase   Result Value Ref Range     (H) 5 - 55 U/L   CBC   Result Value Ref Range    WBC 10.8 4.4 - 11.3 x10*3/uL    nRBC 0.0 0.0 - 0.0 /100 WBCs    RBC 3.26 (L) 4.00 - 5.20 x10*6/uL    Hemoglobin 10.8 (L) 12.0 - 16.0 g/dL    Hematocrit 34.1 (L) 36.0 - 46.0 %     (H) 80 - 100 fL    MCH 33.1 26.0 - 34.0 pg    MCHC 31.7 (L) 32.0 - 36.0 g/dL    RDW 14.9 (H) 11.5 - 14.5 %    Platelets 381 150 - 450 x10*3/uL       SIGNATURE: Aleksandar Camara MD PATIENT NAME: Joyce MORROW  [place]   DATE: December 4, 2023 MRN: [id number]   TIME: 10:09 AM PAGER: [phone]

## 2023-12-05 ENCOUNTER — PHARMACY VISIT (OUTPATIENT)
Dept: PHARMACY | Facility: CLINIC | Age: 78
End: 2023-12-05
Payer: COMMERCIAL

## 2023-12-05 VITALS
OXYGEN SATURATION: 95 % | BODY MASS INDEX: 34.89 KG/M2 | RESPIRATION RATE: 19 BRPM | SYSTOLIC BLOOD PRESSURE: 166 MMHG | HEART RATE: 76 BPM | DIASTOLIC BLOOD PRESSURE: 64 MMHG | TEMPERATURE: 97.3 F | WEIGHT: 177.69 LBS | HEIGHT: 60 IN

## 2023-12-05 LAB
ALBUMIN SERPL BCP-MCNC: 3.5 G/DL (ref 3.4–5)
ALP SERPL-CCNC: 200 U/L (ref 33–136)
ALT SERPL W P-5'-P-CCNC: 33 U/L (ref 7–45)
ANION GAP SERPL CALC-SCNC: 16 MMOL/L (ref 10–20)
AST SERPL W P-5'-P-CCNC: 19 U/L (ref 9–39)
BILIRUB SERPL-MCNC: 0.3 MG/DL (ref 0–1.2)
BUN SERPL-MCNC: 49 MG/DL (ref 6–23)
CALCIUM SERPL-MCNC: 8.8 MG/DL (ref 8.6–10.3)
CHLORIDE SERPL-SCNC: 97 MMOL/L (ref 98–107)
CO2 SERPL-SCNC: 27 MMOL/L (ref 21–32)
CREAT SERPL-MCNC: 6.26 MG/DL (ref 0.5–1.05)
ERYTHROCYTE [DISTWIDTH] IN BLOOD BY AUTOMATED COUNT: 14.6 % (ref 11.5–14.5)
GFR SERPL CREATININE-BSD FRML MDRD: 6 ML/MIN/1.73M*2
GLUCOSE SERPL-MCNC: 84 MG/DL (ref 74–99)
HCT VFR BLD AUTO: 34.7 % (ref 36–46)
HGB BLD-MCNC: 11.1 G/DL (ref 12–16)
MAGNESIUM SERPL-MCNC: 2.28 MG/DL (ref 1.6–2.4)
MCH RBC QN AUTO: 33.1 PG (ref 26–34)
MCHC RBC AUTO-ENTMCNC: 32 G/DL (ref 32–36)
MCV RBC AUTO: 104 FL (ref 80–100)
NRBC BLD-RTO: 0 /100 WBCS (ref 0–0)
PHOSPHATE SERPL-MCNC: 5.2 MG/DL (ref 2.5–4.9)
PLATELET # BLD AUTO: 293 X10*3/UL (ref 150–450)
POTASSIUM SERPL-SCNC: 4.4 MMOL/L (ref 3.5–5.3)
PROT SERPL-MCNC: 5.8 G/DL (ref 6.4–8.2)
RBC # BLD AUTO: 3.35 X10*6/UL (ref 4–5.2)
SODIUM SERPL-SCNC: 136 MMOL/L (ref 136–145)
WBC # BLD AUTO: 9.9 X10*3/UL (ref 4.4–11.3)

## 2023-12-05 PROCEDURE — 2500000001 HC RX 250 WO HCPCS SELF ADMINISTERED DRUGS (ALT 637 FOR MEDICARE OP)

## 2023-12-05 PROCEDURE — 96372 THER/PROPH/DIAG INJ SC/IM: CPT

## 2023-12-05 PROCEDURE — 36415 COLL VENOUS BLD VENIPUNCTURE: CPT

## 2023-12-05 PROCEDURE — 80053 COMPREHEN METABOLIC PANEL: CPT

## 2023-12-05 PROCEDURE — 97165 OT EVAL LOW COMPLEX 30 MIN: CPT | Mod: GO

## 2023-12-05 PROCEDURE — 84100 ASSAY OF PHOSPHORUS: CPT

## 2023-12-05 PROCEDURE — 83735 ASSAY OF MAGNESIUM: CPT

## 2023-12-05 PROCEDURE — 85027 COMPLETE CBC AUTOMATED: CPT

## 2023-12-05 PROCEDURE — RXMED WILLOW AMBULATORY MEDICATION CHARGE

## 2023-12-05 PROCEDURE — 2500000001 HC RX 250 WO HCPCS SELF ADMINISTERED DRUGS (ALT 637 FOR MEDICARE OP): Performed by: INTERNAL MEDICINE

## 2023-12-05 PROCEDURE — 2500000004 HC RX 250 GENERAL PHARMACY W/ HCPCS (ALT 636 FOR OP/ED)

## 2023-12-05 PROCEDURE — G0378 HOSPITAL OBSERVATION PER HR: HCPCS

## 2023-12-05 PROCEDURE — 99239 HOSP IP/OBS DSCHRG MGMT >30: CPT | Performed by: INTERNAL MEDICINE

## 2023-12-05 RX ORDER — HYDRALAZINE HYDROCHLORIDE 10 MG/1
10 TABLET, FILM COATED ORAL EVERY 8 HOURS PRN
Status: DISCONTINUED | OUTPATIENT
Start: 2023-12-05 | End: 2023-12-05 | Stop reason: HOSPADM

## 2023-12-05 RX ORDER — AMLODIPINE BESYLATE 5 MG/1
5 TABLET ORAL DAILY
Status: DISCONTINUED | OUTPATIENT
Start: 2023-12-06 | End: 2023-12-05 | Stop reason: HOSPADM

## 2023-12-05 RX ORDER — FLUTICASONE PROPIONATE 50 MCG
2 SPRAY, SUSPENSION (ML) NASAL DAILY
Qty: 16 G | Refills: 12 | Status: SHIPPED | OUTPATIENT
Start: 2023-12-06 | End: 2023-12-05 | Stop reason: SDUPTHER

## 2023-12-05 RX ORDER — IPRATROPIUM BROMIDE AND ALBUTEROL SULFATE 2.5; .5 MG/3ML; MG/3ML
3 SOLUTION RESPIRATORY (INHALATION) EVERY 2 HOUR PRN
Qty: 180 ML | Refills: 11 | Status: SHIPPED | OUTPATIENT
Start: 2023-12-05 | End: 2023-12-05 | Stop reason: SDUPTHER

## 2023-12-05 RX ORDER — IPRATROPIUM BROMIDE AND ALBUTEROL SULFATE 2.5; .5 MG/3ML; MG/3ML
3 SOLUTION RESPIRATORY (INHALATION) EVERY 2 HOUR PRN
Qty: 180 ML | Refills: 11
Start: 2023-12-05 | End: 2024-01-23 | Stop reason: HOSPADM

## 2023-12-05 RX ORDER — FLUTICASONE PROPIONATE 50 MCG
2 SPRAY, SUSPENSION (ML) NASAL DAILY
Qty: 16 G | Refills: 12
Start: 2023-12-06 | End: 2024-01-23 | Stop reason: HOSPADM

## 2023-12-05 RX ORDER — AMLODIPINE BESYLATE 5 MG/1
5 TABLET ORAL DAILY
Qty: 30 TABLET | Refills: 0 | Status: ON HOLD | OUTPATIENT
Start: 2023-12-06 | End: 2024-01-23

## 2023-12-05 RX ORDER — CARVEDILOL 12.5 MG/1
25 TABLET ORAL
Status: DISCONTINUED | OUTPATIENT
Start: 2023-12-05 | End: 2023-12-05 | Stop reason: HOSPADM

## 2023-12-05 RX ADMIN — CALCIUM CARBONATE (ANTACID) CHEW TAB 500 MG 1000 MG: 500 CHEW TAB at 08:02

## 2023-12-05 RX ADMIN — FLUTICASONE PROPIONATE 2 SPRAY: 50 SPRAY, METERED NASAL at 08:03

## 2023-12-05 RX ADMIN — LOSARTAN POTASSIUM 100 MG: 50 TABLET, FILM COATED ORAL at 08:02

## 2023-12-05 RX ADMIN — Medication 2000 UNITS: at 08:02

## 2023-12-05 RX ADMIN — BENZOCAINE AND MENTHOL 1 LOZENGE: 15; 3.6 LOZENGE ORAL at 13:25

## 2023-12-05 RX ADMIN — Medication 1 CAPSULE: at 08:02

## 2023-12-05 RX ADMIN — Medication 15 ML: at 13:04

## 2023-12-05 RX ADMIN — CALCIUM CARBONATE (ANTACID) CHEW TAB 500 MG 1000 MG: 500 CHEW TAB at 13:04

## 2023-12-05 RX ADMIN — AMLODIPINE BESYLATE 2.5 MG: 5 TABLET ORAL at 08:02

## 2023-12-05 RX ADMIN — SALINE NASAL SPRAY 1 SPRAY: 1.5 SOLUTION NASAL at 08:02

## 2023-12-05 RX ADMIN — HEPARIN SODIUM 5000 UNITS: 5000 INJECTION INTRAVENOUS; SUBCUTANEOUS at 08:02

## 2023-12-05 RX ADMIN — CARVEDILOL 25 MG: 12.5 TABLET, FILM COATED ORAL at 08:02

## 2023-12-05 RX ADMIN — Medication 250 MG: at 08:02

## 2023-12-05 RX ADMIN — HYDRALAZINE HYDROCHLORIDE 10 MG: 10 TABLET, FILM COATED ORAL at 08:02

## 2023-12-05 ASSESSMENT — COGNITIVE AND FUNCTIONAL STATUS - GENERAL
TOILETING: A LITTLE
DRESSING REGULAR LOWER BODY CLOTHING: A LITTLE
HELP NEEDED FOR BATHING: A LITTLE
DAILY ACTIVITIY SCORE: 20
DRESSING REGULAR UPPER BODY CLOTHING: A LITTLE

## 2023-12-05 ASSESSMENT — PAIN - FUNCTIONAL ASSESSMENT: PAIN_FUNCTIONAL_ASSESSMENT: 0-10

## 2023-12-05 ASSESSMENT — ACTIVITIES OF DAILY LIVING (ADL)
ADL_ASSISTANCE: NEEDS ASSISTANCE
BATHING_ASSISTANCE: MINIMAL

## 2023-12-05 ASSESSMENT — PAIN SCALES - GENERAL
PAINLEVEL_OUTOF10: 0 - NO PAIN
PAINLEVEL_OUTOF10: 0 - NO PAIN

## 2023-12-05 NOTE — PROGRESS NOTES
CONSULT PROGRESS NOTES    SERVICE DATE: 12/5/2023   SERVICE TIME: 1:11 PM    CONSULTING SERVICE: Nephrology    ASSESSMENT AND PLAN   78-year-old dialysis patient admitted with dyspnea and hypertension.  1.  Hypertensive urgency  2.  End-stage renal disease  3.  Hyperkalemia  4.  Anemia of chronic kidney disease     Dialysis tomorrow for 4 hours and attempt 2.5L volume removal.  She is back on carvedilol 25 mg p.o. twice daily, losartan 100 mg daily, and off of the nicardipine drip.  Agree with amlodipine introduction of 2.5 mg, increase this to twice daily dosing for now.  Improvement in the hyperkalemia with dialysis.  The hemoglobin is at goal for now.  Defer any erythropoietin stimulating agents at present.  Supportive care per ICU team otherwise.  Should the blood pressure come under better control later today, I have no objection to discharge on the above meds, can add amlodipine 5 mg daily (or 2.5 mg twice daily).  I will follow her.  Case discussed with Dr. Maciel.              SUBJECTIVE  INTERVAL HPI: She feels okay.  She has no more dyspnea.  She tolerated 2 L fluid removal during dialysis yesterday, but her filter clotted.  She also had cramping.  Blood pressure was great yesterday, started to elevate overnight and this morning.  She was commenced on amlodipine.  Off of the nicardipine drip.  On hydralazine oral as well.  Also on IV labetalol.  She wants a cough drop.    MEDICATIONS:  amitriptyline, 10 mg, oral, Nightly  [START ON 12/6/2023] amLODIPine, 5 mg, oral, Daily  B complex-vitamin C-folic acid, 1 capsule, oral, Daily  calcitriol, 0.25 mcg, oral, Every Mon/Wed/Fri  calcium carbonate, 1,000 mg, oral, TID with meals  carvedilol, 25 mg, oral, 2 times per day  cholecalciferol, 2,000 Units, oral, Daily  docusate sodium, 200 mg, oral, Nightly  fluticasone, 2 spray, Each Nostril, Daily  heparin (porcine), 5,000 Units, subcutaneous, q8h  hydroxyurea, 500 mg, oral, Once per day on Mon Wed Fri  losartan,  100 mg, oral, Daily  melatonin, 5 mg, oral, Nightly  polyethylene glycol, 17 g, oral, Daily  saccharomyces boulardii, 250 mg, oral, Daily           PRN medications: acetaminophen, benzocaine-menthol, gabapentin, guaiFENesin, hydrALAZINE, ipratropium-albuteroL, loratadine, meclizine, moisturizing mouth, ondansetron, oxygen, sodium chloride     OBJECTIVE  PHYSICAL EXAM:   Heart Rate:  []   Temp:  [36.3 °C (97.3 °F)-37.1 °C (98.8 °F)]   Resp:  [14-25]   BP: (104-202)/(46-93)   Weight:  [80.6 kg (177 lb 11.1 oz)]   SpO2:  [95 %-100 %]   Body mass index is 34.7 kg/m².  Chronically ill and elderly obese white woman  Somewhat pale skin  Moist mucosa  She is somewhat dyspneic in between sentences  Hearing intact  Normal S1/normal S2  Lungs are clear to auscultation bilaterally  Abdomen is soft, nondistended, nontender, positive bowel sounds  No Johnson catheter in place, no suprapubic tenderness to palpation  No extremity edema  Moves 4 limbs spontaneously  No obvious joint deformities  No lymphadenopathy    DATA:   Labs:  Results for orders placed or performed during the hospital encounter of 12/03/23 (from the past 96 hour(s))   CBC and Auto Differential   Result Value Ref Range    WBC 10.5 4.4 - 11.3 x10*3/uL    nRBC 0.0 0.0 - 0.0 /100 WBCs    RBC 3.44 (L) 4.00 - 5.20 x10*6/uL    Hemoglobin 11.4 (L) 12.0 - 16.0 g/dL    Hematocrit 35.1 (L) 36.0 - 46.0 %     (H) 80 - 100 fL    MCH 33.1 26.0 - 34.0 pg    MCHC 32.5 32.0 - 36.0 g/dL    RDW 15.0 (H) 11.5 - 14.5 %    Platelets 390 150 - 450 x10*3/uL    Neutrophils % 74.5 40.0 - 80.0 %    Immature Granulocytes %, Automated 0.6 0.0 - 0.9 %    Lymphocytes % 11.6 13.0 - 44.0 %    Monocytes % 8.5 2.0 - 10.0 %    Eosinophils % 4.3 0.0 - 6.0 %    Basophils % 0.5 0.0 - 2.0 %    Neutrophils Absolute 7.85 (H) 1.60 - 5.50 x10*3/uL    Immature Granulocytes Absolute, Automated 0.06 0.00 - 0.50 x10*3/uL    Lymphocytes Absolute 1.22 0.80 - 3.00 x10*3/uL    Monocytes Absolute 0.89  (H) 0.05 - 0.80 x10*3/uL    Eosinophils Absolute 0.45 (H) 0.00 - 0.40 x10*3/uL    Basophils Absolute 0.05 0.00 - 0.10 x10*3/uL   Comprehensive metabolic panel   Result Value Ref Range    Glucose 128 (H) 74 - 99 mg/dL    Sodium 140 136 - 145 mmol/L    Potassium 5.0 3.5 - 5.3 mmol/L    Chloride 97 (L) 98 - 107 mmol/L    Bicarbonate 25 21 - 32 mmol/L    Anion Gap 23 (H) 10 - 20 mmol/L    Urea Nitrogen 77 (H) 6 - 23 mg/dL    Creatinine 7.81 (H) 0.50 - 1.05 mg/dL    eGFR 5 (L) >60 mL/min/1.73m*2    Calcium 9.7 8.6 - 10.3 mg/dL    Albumin 3.7 3.4 - 5.0 g/dL    Alkaline Phosphatase 236 (H) 33 - 136 U/L    Total Protein 6.6 6.4 - 8.2 g/dL    AST 50 (H) 9 - 39 U/L    Bilirubin, Total 0.4 0.0 - 1.2 mg/dL    ALT 51 (H) 7 - 45 U/L   Troponin I, High Sensitivity   Result Value Ref Range    Troponin I, High Sensitivity 37 (H) 0 - 13 ng/L   B-Type Natriuretic Peptide   Result Value Ref Range    BNP 1,761 (H) 0 - 99 pg/mL   Blood Gas Venous Full Panel   Result Value Ref Range    POCT pH, Venous 7.40 7.33 - 7.43 pH    POCT pCO2, Venous 46 41 - 51 mm Hg    POCT pO2, Venous 51 (H) 35 - 45 mm Hg    POCT SO2, Venous 79 (H) 45 - 75 %    POCT Oxy Hemoglobin, Venous 77.4 (H) 45.0 - 75.0 %    POCT Hematocrit Calculated, Venous 53.0 (H) 36.0 - 46.0 %    POCT Sodium, Venous 133 (L) 136 - 145 mmol/L    POCT Potassium, Venous 4.7 3.5 - 5.3 mmol/L    POCT Chloride, Venous 98 98 - 107 mmol/L    POCT Ionized Calicum, Venous 1.18 1.10 - 1.33 mmol/L    POCT Glucose, Venous 127 (H) 74 - 99 mg/dL    POCT Lactate, Venous 1.3 0.4 - 2.0 mmol/L    POCT Base Excess, Venous 2.8 -2.0 - 3.0 mmol/L    POCT HCO3 Calculated, Venous 28.5 (H) 22.0 - 26.0 mmol/L    POCT Hemoglobin, Venous 17.7 (H) 12.0 - 16.0 g/dL    POCT Anion Gap, Venous 11.0 10.0 - 25.0 mmol/L    Patient Temperature      FiO2 28 %    Apparatus CANNULA    Influenza A, and B PCR   Result Value Ref Range    Flu A Result Not Detected Not Detected    Flu B Result Not Detected Not Detected    SARS-CoV-2 RT PCR   Result Value Ref Range    Coronavirus 2019, PCR Not Detected Not Detected   Troponin I, High Sensitivity, Initial   Result Value Ref Range    Troponin I, High Sensitivity 64 (HH) 0 - 13 ng/L   Troponin I, High Sensitivity, Initial   Result Value Ref Range    Troponin I, High Sensitivity 62 (HH) 0 - 13 ng/L   Troponin, High Sensitivity, 1 Hour   Result Value Ref Range    Troponin I, High Sensitivity 67 (HH) 0 - 13 ng/L   Comprehensive metabolic panel   Result Value Ref Range    Glucose 111 (H) 74 - 99 mg/dL    Sodium 138 136 - 145 mmol/L    Potassium 5.4 (H) 3.5 - 5.3 mmol/L    Chloride 96 (L) 98 - 107 mmol/L    Bicarbonate 26 21 - 32 mmol/L    Anion Gap 21 (H) 10 - 20 mmol/L    Urea Nitrogen 81 (H) 6 - 23 mg/dL    Creatinine 8.69 (H) 0.50 - 1.05 mg/dL    eGFR 4 (L) >60 mL/min/1.73m*2    Calcium 9.1 8.6 - 10.3 mg/dL    Albumin 3.7 3.4 - 5.0 g/dL    Alkaline Phosphatase 233 (H) 33 - 136 U/L    Total Protein 5.9 (L) 6.4 - 8.2 g/dL    AST 33 9 - 39 U/L    Bilirubin, Total 0.3 0.0 - 1.2 mg/dL    ALT 46 (H) 7 - 45 U/L   Magnesium   Result Value Ref Range    Magnesium 2.38 1.60 - 2.40 mg/dL   Phosphorus   Result Value Ref Range    Phosphorus 4.7 2.5 - 4.9 mg/dL   Troponin I, High Sensitivity   Result Value Ref Range    Troponin I, High Sensitivity 67 (HH) 0 - 13 ng/L   Gamma-Glutamyl Transferase   Result Value Ref Range     (H) 5 - 55 U/L   Hepatitis B surface antigen   Result Value Ref Range    Hepatitis B Surface AG Nonreactive Nonreactive   Hepatitis B surface antibody   Result Value Ref Range    Hepatitis B Surface .7 (H) <10.0 mIU/mL   CBC   Result Value Ref Range    WBC 10.8 4.4 - 11.3 x10*3/uL    nRBC 0.0 0.0 - 0.0 /100 WBCs    RBC 3.26 (L) 4.00 - 5.20 x10*6/uL    Hemoglobin 10.8 (L) 12.0 - 16.0 g/dL    Hematocrit 34.1 (L) 36.0 - 46.0 %     (H) 80 - 100 fL    MCH 33.1 26.0 - 34.0 pg    MCHC 31.7 (L) 32.0 - 36.0 g/dL    RDW 14.9 (H) 11.5 - 14.5 %    Platelets 381 150 - 450  x10*3/uL   Electrocardiogram, 12-lead PRN ACS symptoms   Result Value Ref Range    Ventricular Rate 73 BPM    Atrial Rate 73 BPM    MS Interval 154 ms    QRS Duration 92 ms    QT Interval 438 ms    QTC Calculation(Bazett) 482 ms    P Axis -3 degrees    R Axis 21 degrees    T Axis 27 degrees    QRS Count 12 beats    Q Onset 218 ms    P Onset 141 ms    P Offset 192 ms    T Offset 437 ms    QTC Fredericia 467 ms   CBC   Result Value Ref Range    WBC 9.9 4.4 - 11.3 x10*3/uL    nRBC 0.0 0.0 - 0.0 /100 WBCs    RBC 3.35 (L) 4.00 - 5.20 x10*6/uL    Hemoglobin 11.1 (L) 12.0 - 16.0 g/dL    Hematocrit 34.7 (L) 36.0 - 46.0 %     (H) 80 - 100 fL    MCH 33.1 26.0 - 34.0 pg    MCHC 32.0 32.0 - 36.0 g/dL    RDW 14.6 (H) 11.5 - 14.5 %    Platelets 293 150 - 450 x10*3/uL   Comprehensive metabolic panel   Result Value Ref Range    Glucose 84 74 - 99 mg/dL    Sodium 136 136 - 145 mmol/L    Potassium 4.4 3.5 - 5.3 mmol/L    Chloride 97 (L) 98 - 107 mmol/L    Bicarbonate 27 21 - 32 mmol/L    Anion Gap 16 10 - 20 mmol/L    Urea Nitrogen 49 (H) 6 - 23 mg/dL    Creatinine 6.26 (H) 0.50 - 1.05 mg/dL    eGFR 6 (L) >60 mL/min/1.73m*2    Calcium 8.8 8.6 - 10.3 mg/dL    Albumin 3.5 3.4 - 5.0 g/dL    Alkaline Phosphatase 200 (H) 33 - 136 U/L    Total Protein 5.8 (L) 6.4 - 8.2 g/dL    AST 19 9 - 39 U/L    Bilirubin, Total 0.3 0.0 - 1.2 mg/dL    ALT 33 7 - 45 U/L   Magnesium   Result Value Ref Range    Magnesium 2.28 1.60 - 2.40 mg/dL   Phosphorus   Result Value Ref Range    Phosphorus 5.2 (H) 2.5 - 4.9 mg/dL         SIGNATURE: Aleksandar Camara MD PATIENT NAME: Joyce Bethea   DATE: December 5, 2023 MRN: [id number]   TIME: 1:11 PM PAGER: [phone]

## 2023-12-05 NOTE — PROGRESS NOTES
Physical Therapy                 Therapy Communication Note    Patient Name: Joyce Bethea  MRN: [id number]  Today's Date: 12/5/2023     Discipline: Physical Therapy    Missed Visit Reason: Missed Visit Reason: Other (Comment) (Pt status discussed with RN at 1408, pt preparing for DC, anticipating return to senior care facility without concern. No evaluation completed.)    Missed Time: Attempt

## 2023-12-05 NOTE — CARE PLAN
The patient's goals for the shift include      The clinical goals for the shift include To remain safe throughout the shift.

## 2023-12-05 NOTE — PROGRESS NOTES
Joyce Bethea is a 78 y.o. female on day 1 of admission presenting with Hypertension secondary to other renal disorders.      Subjective   Pt denies HA, vision changes, or change in mentation. States worsening of a chronic cough and dry mouth. Her BP overnight was elevated to max , she was treated with one time IV labetalol 10mg, hydralazine 10mg oral, amlodipine 2.5 mg, and received her home dose of coreg/losartan. BP improved after these medications to SBP 130s-140s. Pt asymptomatic during these elevations in BP.       Objective     Last Recorded Vitals  BP (!) 195/79   Pulse 78   Temp 37.1 °C (98.8 °F) (Temporal)   Resp 21   Wt 80.6 kg (177 lb 11.1 oz)   SpO2 95%   Intake/Output last 3 Shifts:    Intake/Output Summary (Last 24 hours) at 12/5/2023 1221  Last data filed at 12/5/2023 1100  Gross per 24 hour   Intake 865 ml   Output 2000 ml   Net -1135 ml       Admission Weight  Weight: 76.6 kg (168 lb 14 oz) (12/03/23 2250)    Daily Weight  12/05/23 : 80.6 kg (177 lb 11.1 oz)    Image Results  Electrocardiogram, 12-lead PRN ACS symptoms  Normal sinus rhythm  Normal ECG  When compared with ECG of 03-DEC-2023 22:48,  Previous ECG has undetermined rhythm, needs review  Non-specific change in ST segment in Inferior leads      Physical Exam  Constitutional:       General: She is not in acute distress.     Appearance: Normal appearance.   HENT:      Head: Normocephalic and atraumatic.   Eyes:      Conjunctiva/sclera: Conjunctivae normal.   Cardiovascular:      Rate and Rhythm: Normal rate and regular rhythm.      Heart sounds: No murmur heard.     No friction rub. No gallop.   Pulmonary:      Effort: Pulmonary effort is normal. No respiratory distress.      Breath sounds: No wheezing, rhonchi or rales.   Abdominal:      General: Abdomen is flat. Bowel sounds are normal.      Palpations: Abdomen is soft.      Tenderness: There is no abdominal tenderness.   Musculoskeletal:         General: No signs of  injury.      Right lower leg: No edema.      Left lower leg: No edema.   Skin:     General: Skin is warm and dry.      Findings: No rash.   Neurological:      General: No focal deficit present.      Mental Status: She is alert and oriented to person, place, and time. Mental status is at baseline.   Psychiatric:         Mood and Affect: Mood normal.         Behavior: Behavior normal.         Relevant Results  Scheduled medications  amitriptyline, 10 mg, oral, Nightly  [START ON 12/6/2023] amLODIPine, 5 mg, oral, Daily  B complex-vitamin C-folic acid, 1 capsule, oral, Daily  calcitriol, 0.25 mcg, oral, Every Mon/Wed/Fri  calcium carbonate, 1,000 mg, oral, TID with meals  carvedilol, 25 mg, oral, 2 times per day  cholecalciferol, 2,000 Units, oral, Daily  docusate sodium, 200 mg, oral, Nightly  fluticasone, 2 spray, Each Nostril, Daily  heparin (porcine), 5,000 Units, subcutaneous, q8h  hydroxyurea, 500 mg, oral, Once per day on Mon Wed Fri  losartan, 100 mg, oral, Daily  melatonin, 5 mg, oral, Nightly  polyethylene glycol, 17 g, oral, Daily  saccharomyces boulardii, 250 mg, oral, Daily      Continuous medications     PRN medications  PRN medications: acetaminophen, benzocaine-menthol, gabapentin, guaiFENesin, hydrALAZINE, ipratropium-albuteroL, loratadine, meclizine, moisturizing mouth, ondansetron, oxygen, sodium chloride    Results for orders placed or performed during the hospital encounter of 12/03/23 (from the past 24 hour(s))   Electrocardiogram, 12-lead PRN ACS symptoms   Result Value Ref Range    Ventricular Rate 73 BPM    Atrial Rate 73 BPM    KS Interval 154 ms    QRS Duration 92 ms    QT Interval 438 ms    QTC Calculation(Bazett) 482 ms    P Axis -3 degrees    R Axis 21 degrees    T Axis 27 degrees    QRS Count 12 beats    Q Onset 218 ms    P Onset 141 ms    P Offset 192 ms    T Offset 437 ms    QTC Fredericia 467 ms   CBC   Result Value Ref Range    WBC 9.9 4.4 - 11.3 x10*3/uL    nRBC 0.0 0.0 - 0.0 /100  WBCs    RBC 3.35 (L) 4.00 - 5.20 x10*6/uL    Hemoglobin 11.1 (L) 12.0 - 16.0 g/dL    Hematocrit 34.7 (L) 36.0 - 46.0 %     (H) 80 - 100 fL    MCH 33.1 26.0 - 34.0 pg    MCHC 32.0 32.0 - 36.0 g/dL    RDW 14.6 (H) 11.5 - 14.5 %    Platelets 293 150 - 450 x10*3/uL   Comprehensive metabolic panel   Result Value Ref Range    Glucose 84 74 - 99 mg/dL    Sodium 136 136 - 145 mmol/L    Potassium 4.4 3.5 - 5.3 mmol/L    Chloride 97 (L) 98 - 107 mmol/L    Bicarbonate 27 21 - 32 mmol/L    Anion Gap 16 10 - 20 mmol/L    Urea Nitrogen 49 (H) 6 - 23 mg/dL    Creatinine 6.26 (H) 0.50 - 1.05 mg/dL    eGFR 6 (L) >60 mL/min/1.73m*2    Calcium 8.8 8.6 - 10.3 mg/dL    Albumin 3.5 3.4 - 5.0 g/dL    Alkaline Phosphatase 200 (H) 33 - 136 U/L    Total Protein 5.8 (L) 6.4 - 8.2 g/dL    AST 19 9 - 39 U/L    Bilirubin, Total 0.3 0.0 - 1.2 mg/dL    ALT 33 7 - 45 U/L   Magnesium   Result Value Ref Range    Magnesium 2.28 1.60 - 2.40 mg/dL   Phosphorus   Result Value Ref Range    Phosphorus 5.2 (H) 2.5 - 4.9 mg/dL     Electrocardiogram, 12-lead PRN ACS symptoms    Result Date: 12/4/2023  Normal sinus rhythm Normal ECG When compared with ECG of 03-DEC-2023 22:48, Previous ECG has undetermined rhythm, needs review Non-specific change in ST segment in Inferior leads    XR chest 1 view    Result Date: 12/3/2023  Interpreted By:  Margaret Weinstein, STUDY: XR CHEST 1 VIEW;  12/3/2023 11:16 pm   INDICATION: Signs/Symptoms:sob.   COMPARISON: 06/04/2023   ACCESSION NUMBER(S): [id number]   ORDERING CLINICIAN: SHERON ARANDA   FINDINGS: Bilateral axillary surgical clips.   CARDIOMEDIASTINAL SILHOUETTE: Stable enlarged cardiac silhouette.   LUNGS: No pulmonary consolidation, pleural effusion or pneumothorax. Mild right basilar atelectasis.   ABDOMEN: No remarkable upper abdominal findings.   BONES: No acute osseous abnormality. Postsurgical changes of distal right clavicle resection and anchors in the humeral head.       Mild right basilar  atelectasis.   MACRO: None   Signed by: Margaret Weinstein 12/3/2023 11:51 PM Dictation workstation:   [id number] \    Assessment/Plan   This patient currently has cardiac telemetry ordered; if you would like to modify or discontinue the telemetry order, click here to go to the orders activity to modify/discontinue the order.    78f Sister living at [facility] with HTN, HLD, hx breast cancer 1991 s/p mastectomies, ESRD on HD MWF, HEATH on CPAP, chronic respiratory failure on 2L NC, essential thrombocytosis, HFpEF 55-60 EF 6/2023, MDD, chronic anemia, who was brought in by ambulance after being found with respiratory distress, admitted to ICU for hypertensive emergency started on NTG drip in ED (resolved 12/4), currently monitoring SBP.      Neuro/Psych:  #concern for hypertensive emergency   -baseline mentation on admission with no vision change and mild HA  -resolution of N/V  -no current indication for CT head due to resolving symptoms over night  -continue home amitriptyline for insomnia     Cardiovascular:  #Concern for hypertensive emergency   #elevated troponin  -BP 210s systolic in /109 highest with EMS. shortness of breath, chest pressure and N/V as symptoms (resolved). May be volume overload and require dialysis, CXR no acute process  -mild elevation in troponin on admission in setting of ESRD, trended from 64->62->67, without complaint of chest pain/SOB on 12/4. EKG NSR  -s/p NTG drip in ED, nicardipine drip on admission to ICU 12/4 7AM with SBP <180  -Discontinued nicardipine drip 12/4, resumed home antihypertensives: carvedilol 25 bid (scheduled for 9am and 6pm), losartan 100 daily  -started on amlodipine 2.5mg overnight, will discharge on amlodipine 5mg per nephrology recs  -hydralazine 10mg TID PRN for SBP>180  -monitoring BP today, if SBP remains <180 can consider discharge to home     #CHF  -HFpEF 55-60 EF 6/2023   -elevated BNP on admission without signs of volume overload on physical exam  -CXR  without pleural effusion  -will monitor fluid status, dialysis to resumed (MWF)     Pulmonary:  #dypsnea (resolved)  #chronic respiratory failure   -2L NC at home, continue on admission  -CXR no acute process. Is 2L 100% SpO2 currently  -Continue home CPAP, PEEP 15     Gastrointestinal:  #Elevated LFTs, chronic (resolved)  -Resolving on 12/4 with ALT 51->46 and AST 50->33     #GERD  -continue home po panroprazole 20 prn   -continue home probiotic and tums with meals    #Cough  #Dry Mouth  -artificial saliva  -cepastat sore throat lozenge PRN     Renal:  #ESRD on HD   -1L fluid restriction diet  -HD resumed (receives MWF)  -nephrology consulted, appreciate recs     Endocrine:  -no acute issues      Infectious Disease:  -no acute issues      Hematology/Oncology:  #chronic anemia  -related to ESRD and DAVID   -gets Procrit MWF, holding. Hgb 11.4 on admission, stable. No longer on oral iron.     #essential thrombocytosis  -Continue home hydroxyurea, hold anagrelide     Musculoskeletal/Skin:  #chronic arthritis and lower back pain  -tylenol prn      Fluids: PO, 1L fluid restriction   Electrolytes: replete prn   Nutrition: renal diet, 1L fluid restriction   VTE prophylaxis: heparin subq   Code Status: DNR and DNI discussed on admission   Lines/Tubes: PIV   DRIPS: none     Disposition: Pt admitted to ICU for Bp control on an antihypertensive drip, transfer orders to floor placed, can transfer or discharge home.      Principal Problem:    Hypertension secondary to other renal disorders          Dora Maciel MD

## 2023-12-05 NOTE — PROGRESS NOTES
Occupational Therapy    Evaluation    Patient Name: Joyce Bethea  MRN: [id number]  Today's Date: 12/5/2023  Time Calculation  Start Time: 1258  Stop Time: 1320  Time Calculation (min): 22 min    Assessment  IP OT Assessment  OT Assessment: Pt presents with decreased activity tolerance and endurance needed for ADL/functional mobility. Recommend continued OT to maximize safety and independence.  Prognosis: Good  Barriers to Discharge: None  Evaluation/Treatment Tolerance: Patient limited by fatigue  Medical Staff Made Aware: Yes  End of Session Communication: Bedside nurse  End of Session Patient Position: Bed, 3 rail up  Plan:  Treatment Interventions: Endurance training, Functional transfer training, ADL retraining, Compensatory technique education  OT Frequency: 2 times per week  OT Discharge Recommendations: Low intensity level of continued care  OT Recommended Transfer Status: Stand by assist  OT - OK to Discharge: Yes (per OT POC)    Subjective   Current Problem:  1. Hypertension secondary to other renal disorders        2. Shortness of breath  ipratropium-albuteroL (Duo-Neb) 0.5-2.5 mg/3 mL nebulizer solution    fluticasone (Flonase) 50 mcg/actuation nasal spray    DISCONTINUED: ipratropium-albuteroL (Duo-Neb) 0.5-2.5 mg/3 mL nebulizer solution    DISCONTINUED: fluticasone (Flonase) 50 mcg/actuation nasal spray      3. Pleural effusion        4. ESRD needing dialysis (CMS/[facility])  Referral to Nephrology      5. Benign essential hypertension  amLODIPine (Norvasc) 5 mg tablet        General:  General  Reason for Referral: Referred to OT following respiratory distress, hypertensive emergency  Referred By: Cortes Rosado MD  Past Medical History Relevant to Rehab: HTN, HLD, ESRD on HD MWF, HEATH on CPAP, chronic respiratory failure on 2L NC, essential thrombocytosis, HFpEF 55-60 EF 6/2023, MDD, chronic anemia  Prior to Session Communication: Bedside nurse  Patient Position Received: Bed, 2 rail up, Alarm off, not  on at start of session  General Comment: Pt pleasent and agreeable to therapy. Pt fatigue, SOB quickly with activity. Has been getting up to use toilet with staff supervision.  2L O2 in place  Precautions:  Medical Precautions: Fall precautions    Pain:  Pain Assessment  Pain Assessment: 0-10  Pain Score: 0 - No pain    Objective   Cognition:  Overall Cognitive Status: Within Functional Limits  Orientation Level: Oriented X4           Home Living:  Type of Home: Assisted living  Lives With: Alone  Home Adaptive Equipment: Cane, Walker rolling or standard, Scooter (rollator)  Home Layout: One level  Home Access: Elevator  Bathroom Shower/Tub:  (whirlpool)  Bathroom Toilet: Standard  Bathroom Equipment: Grab bars around toilet   Prior Function:  Level of [place]: Needs assistance with ADLs, Needs assistance with homemaking, Needs assistance with functional transfers  Receives Help From: Personal care attendant  ADL Assistance: Needs assistance  Homemaking Assistance: Needs assistance  Prior Function Comments: amb in room using cane, uses rollator or scooter in community. [place] staff assists with bathing 3-5x/wk, occasional assistance with dressing. Staff helps with IADL. Receives therapy every T/TH    ADL:  Eating Assistance: Independent  Grooming Assistance: Independent  Bathing Assistance: Minimal  UE Dressing Assistance: Independent  LE Dressing Assistance: Minimal  Toileting Assistance with Device: Minimal  Functional Assistance: Minimal  ADL Comments: donned/doffed socks using figure 4 with increased time needed for fatigue. Other ADL performance anticipated d/t current clinical presentation.  Activity Tolerance:  Endurance: Tolerates less than 10 min exercise, no significant change in vital signs  Bed Mobility/Transfers: Bed Mobility  Bed Mobility: Yes  Bed Mobility 1  Bed Mobility 1: Supine to sitting, Sitting to supine  Level of Assistance 1: Close supervision    Transfers  Transfer: Yes  Transfer  1  Transfer From 1: Sit to  Transfer to 1: Stand  Technique 1: Sit to stand, Stand to sit  Transfer Level of Assistance 1: Contact guard, Hand held assistance  Trials/Comments 1: Explained morning transfer routine and demo'd good safety awareness with body mechanics but limited by fatigue/SOB. increased time    Ambulation/Gait Training:  Ambulation/Gait Training  Ambulation/Gait Training Performed: Yes  Ambulation/Gait Training 1  Surface 1: Level tile  Device 1: No device  Assistance 1: Hand held assistance  Comments/Distance (ft) 1: ~10 steps near edge of bed, pt demo'd good balance but limited by fatigue/SOB  Sitting Balance:  Static Sitting Balance  Static Sitting-Balance Support: Feet supported  Static Sitting-Level of Assistance: Independent     Strength:  Strength Comments: RUE WFL, LUE grossly 3-/5    Hand Function:  Hand Function  Gross Grasp: Functional  Extremities: RUE   RUE : Within Functional Limits and LUE   LUE: Exceptions to WFL  LUE AROM (degrees)  LUE AROM Comment: shoulder grossly 120 degrees flexion, elbow to distal WFL    Outcome Measures: [facility] Daily Activity  Putting on and taking off regular lower body clothing: A little  Bathing (including washing, rinsing, drying): A little  Putting on and taking off regular upper body clothing: A little  Toileting, which includes using toilet, bedpan or urinal: A little  Taking care of personal grooming such as brushing teeth: None  Eating Meals: None  Daily Activity - Total Score: 20      Education Documentation  Body Mechanics, taught by Jacky Wilson OT at 12/5/2023  1:54 PM.  Learner: Patient  Readiness: Acceptance  Method: Explanation  Response: Verbalizes Understanding    Precautions, taught by Jacky Wilson OT at 12/5/2023  1:54 PM.  Learner: Patient  Readiness: Acceptance  Method: Explanation  Response: Verbalizes Understanding    ADL Training, taught by Jacky Wilson OT at 12/5/2023  1:54 PM.  Learner: Patient  Readiness:  Acceptance  Method: Explanation  Response: Verbalizes Understanding    Education Comments  No comments found.      Goals:   Encounter Problems       Encounter Problems (Active)       OT Goals       Pt will increase endurance to tolerate 15min of upright activity with no more than 1 rest break in order to increase ability to engage in ADL completion.  (Progressing)       Start:  12/05/23    Expected End:  12/19/23            Pt will demo ADL routine and meaningful daily activities using modifications as needed  (Progressing)       Start:  12/05/23    Expected End:  12/19/23            Pt will tolerate 10min stand during functional task completion with no more than 1 rest break in order to increase endurance for functional task completion.  (Progressing)       Start:  12/05/23    Expected End:  12/19/23            Pt will demo increased functional mobility to tolerate tasks necessary to complete ADL routine.  (Progressing)       Start:  12/05/23    Expected End:  12/19/23            Pt will demo and/or verbalize 2-3 energy conservation techniques to incorporate into functional mobility or ADL to improve performance and increase independence.  (Progressing)       Start:  12/05/23    Expected End:  12/19/23

## 2023-12-05 NOTE — DISCHARGE INSTRUCTIONS
For managing your hypertension, you are to continue with your current prescription for carvedilol (coreg) 25mg twice daily and your losartan 100mg. After discussing your recent high blood pressure with Dr. Camara, it was decided you should start on amlodipine 5mg daily. Follow up appointment with Dr. Camara has been arranged. If you have BP values at home with a systolic reading >200 or >180 with headache/vision changes you should return to the emergency department.    [facility] service requested for your appointment, if you do not hear from them in the next couple of days please call [phone] to make an appointment. Please follow up with your PCP within 1-2 weeks after being discharged from the hospital, you will need to call to schedule this appointment.

## 2023-12-05 NOTE — CARE PLAN
The patient's goals for the shift include      The clinical goals for the shift include To remain safe throughout the shift.    Over the shift, the patient did not make progress toward the following goals. Barriers to progression include . Recommendations to address these barriers include  Problem: Safety - Adult  Goal: Free from fall injury  Outcome: Progressing     Problem: Skin  Goal: Promote skin healing  Outcome: Progressing   .

## 2023-12-05 NOTE — DISCHARGE SUMMARY
Discharge Diagnosis  Hypertension secondary to other renal disorders    Issues Requiring Follow-Up  -HTN management with new amlodipine 5mg daily, follow up appointment with nephrology    Discharge Meds     Your medication list        START taking these medications        Instructions Last Dose Given Next Dose Due   amLODIPine 5 mg tablet  Commonly known as: Norvasc  Start taking on: December 6, 2023      Take 1 tablet (5 mg) by mouth once daily. Do not start before December 6, 2023.              CHANGE how you take these medications        Instructions Last Dose Given Next Dose Due   fluticasone 50 mcg/actuation nasal spray  Commonly known as: Flonase  Start taking on: December 6, 2023  What changed:   how to take this  additional instructions      Administer 2 sprays into each nostril once daily. Shake gently. Before first use, prime pump. After use, clean tip and replace cap. Do not start before December 6, 2023.       ipratropium-albuteroL 0.5-2.5 mg/3 mL nebulizer solution  Commonly known as: Duo-Neb  What changed:   how much to take  how to take this  when to take this  reasons to take this      Take 3 mL by nebulization every 2 hours if needed for wheezing or shortness of breath.              CONTINUE taking these medications        Instructions Last Dose Given Next Dose Due   acetaminophen 325 mg tablet  Commonly known as: Tylenol           amitriptyline 10 mg tablet  Commonly known as: Elavil           calcitriol 0.25 mcg capsule  Commonly known as: Rocaltrol           carvedilol 25 mg tablet  Commonly known as: Coreg           cholecalciferol 50 mcg (2,000 unit) capsule  Commonly known as: Vitamin D-3           gabapentin 100 mg capsule  Commonly known as: Neurontin           hydroxyurea 500 mg capsule  Commonly known as: Hydrea           loratadine 10 mg tablet  Commonly known as: Claritin           losartan 25 mg tablet  Commonly known as: Cozaar           meclizine 25 mg tablet  Commonly known as:  Antivert           Mucinex 600 mg 12 hr tablet  Generic drug: guaiFENesin           omeprazole 20 mg DR capsule  Commonly known as: PriLOSEC           oxygen gas therapy  Commonly known as: O2           Procrit 2,000 unit/mL injection  Generic drug: epoetin palomo           saccharomyces boulardii 250 mg capsule  Commonly known as: Florastor           Senna-S 8.6-50 mg tablet  Generic drug: sennosides-docusate sodium           sodium chloride 0.65 % nasal drops  Commonly known as: Ayr           Triphrocaps 1 mg capsule  Generic drug: B complex-vitamin C-folic acid                     Where to Get Your Medications        These medications were sent to [facility]  [address], [place] [address]      Hours: 9 AM to 5 PM Mon-Fri Phone: [phone]   amLODIPine 5 mg tablet       Information about where to get these medications is not yet available    Ask your nurse or doctor about these medications  fluticasone 50 mcg/actuation nasal spray  ipratropium-albuteroL 0.5-2.5 mg/3 mL nebulizer solution         Test Results Pending At Discharge  Pending Labs       Order Current Status    Troponin Series, (0, 1 HR) In process            Hospital Course  Joyce Bethea is a 78 y.o. who was brought in by ambulance on 12/3 after being found with respiratory distress. Per EMS, 222/109 bp initially at around 10:17 PM with 88 hr, 31 rr labored breathing, 97% SpO2. Pt pt was put on 4L NC due to increased WOB.    Pt states at time of admission she had generalized weakness, shortness of breath, n/v, HA (without visual changes) and chest heaviness before coming to the ED. She has hx of similar symptoms believed to be secondary to elevated BP which gets better after dialysis. These hypertensive episodes started 2-3 months ago. Receives MWF HD, has not missed a session. In the ED, vitals afebrile, 80 hr, 199/78 going up to 227/74. Rr 18, 100% spo2. Labs significant for potassium 5.0, bun 77, cr 7.81. alk phos 236  around baseline. Ast and alt in 50s. BNP 1.7k. trop 37 trending to 64. Glucose 128.   Wbc 10.5, hgb 11.4, plt 390. Negative flu a, b and covid. VBG 7.4, 46 pCO2. CXR r basilar atelectasis.     In the ED pt given 0.4mg ntg sl and started on NTG drip. 12/4: She was transferred to ICU and started on a nicardipine drip, this was discontinued same day once SBP<190 and pt asymptomatic (pt's SBP at home ~190 in the past 2 months). She was resumed on her home anti-hypertensive regimen with losartan 100mg and coreg 25mg BID. Dialysis occurred on 12/4. She was assessed by nephrology.    Pt's BP overnight on 12/4 and 12/5AM was elevated (max ), requiring labetalol and hydralazine. Pt will be discharged on amlodipine 5mg daily in addition to existing losartan/coreg. Follow up with Dr. Camara outpatient and PCP in 1-2 weeks will be arranged.       Pertinent Physical Exam At Time of Discharge  Physical Exam  Constitutional:       General: She is not in acute distress.     Appearance: Normal appearance.   HENT:      Head: Normocephalic and atraumatic.   Eyes:      Conjunctiva/sclera: Conjunctivae normal.   Cardiovascular:      Rate and Rhythm: Normal rate and regular rhythm.      Heart sounds: No murmur heard.     No friction rub. No gallop.   Pulmonary:      Effort: Pulmonary effort is normal. No respiratory distress.      Breath sounds: No wheezing, rhonchi or rales.   Abdominal:      General: Abdomen is flat. Bowel sounds are normal.      Palpations: Abdomen is soft.      Tenderness: There is no abdominal tenderness.   Musculoskeletal:         General: No signs of injury.      Right lower leg: No edema.      Left lower leg: No edema.   Skin:     General: Skin is warm and dry.      Findings: No rash.   Neurological:      General: No focal deficit present.      Mental Status: She is alert and oriented to person, place, and time. Mental status is at baseline.   Psychiatric:         Mood and Affect: Mood normal.          Behavior: Behavior normal.         Outpatient Follow-Up  Future Appointments   Date Time Provider Department Center   12/14/2023 10:30 AM Azul Zuñiga PA-C [id number] [facility]   10/29/2024  2:20 PM Mercedes Manning MD [id number] [facility]         Dora Maciel MD

## 2023-12-05 NOTE — PROGRESS NOTES
12/05/23 1411   Discharge Planning   Living Arrangements Other (Comment)  ([facility])   Support Systems [facility];Friends/neighbors   Type of Residence Nursing home/residential care   Care Facility Name [facility]   Who is requesting discharge planning? Provider   Home or Post Acute Services Post acute facilities (Rehab/SNF/etc)   Type of Post Acute Facility Services Other (Comment)  ([facility])     12/05/2023 Call placed to DON to inform of patient's DC. Bedside nurse called report to charge nurse at [place] ishan garcia. Awaiting confirmed transport time from [facility] at this time.

## 2023-12-07 NOTE — SIGNIFICANT EVENT
Follow Up Phone Call    Outgoing phone call    Spoke to: Joyce Bethea Relationship:self   Phone number: [phone]      Outcome: contacted patient/ family   Chief Complaint   Patient presents with   • Shortness of Breath     Pt c/o SOB and had some emesis in the squad on the ride here.          Diagnosis:Not applicable  States she is feeling better. No further questions or concerns.

## 2023-12-09 LAB
ATRIAL RATE: 73 BPM
P AXIS: -3 DEGREES
P OFFSET: 192 MS
P ONSET: 141 MS
PR INTERVAL: 154 MS
Q ONSET: 218 MS
QRS COUNT: 12 BEATS
QRS DURATION: 92 MS
QT INTERVAL: 438 MS
QTC CALCULATION(BAZETT): 482 MS
QTC FREDERICIA: 467 MS
R AXIS: 21 DEGREES
T AXIS: 27 DEGREES
T OFFSET: 437 MS
VENTRICULAR RATE: 73 BPM

## 2023-12-14 ENCOUNTER — OFFICE VISIT (OUTPATIENT)
Dept: HEMATOLOGY/ONCOLOGY | Facility: CLINIC | Age: 78
End: 2023-12-14
Payer: MEDICARE

## 2023-12-14 VITALS
TEMPERATURE: 96.8 F | OXYGEN SATURATION: 94 % | HEIGHT: 59 IN | SYSTOLIC BLOOD PRESSURE: 144 MMHG | HEART RATE: 70 BPM | RESPIRATION RATE: 16 BRPM | DIASTOLIC BLOOD PRESSURE: 70 MMHG | WEIGHT: 172.18 LBS | BODY MASS INDEX: 34.71 KG/M2

## 2023-12-14 DIAGNOSIS — D47.3 ESSENTIAL THROMBOCYTHEMIA (MULTI): Primary | ICD-10-CM

## 2023-12-14 LAB
ALBUMIN SERPL BCP-MCNC: 3.7 G/DL (ref 3.4–5)
ALP SERPL-CCNC: 223 U/L (ref 33–136)
ALT SERPL W P-5'-P-CCNC: 20 U/L (ref 7–45)
ANION GAP SERPL CALC-SCNC: 16 MMOL/L (ref 10–20)
AST SERPL W P-5'-P-CCNC: 17 U/L (ref 9–39)
BASOPHILS # BLD AUTO: 0.05 X10*3/UL (ref 0–0.1)
BASOPHILS NFR BLD AUTO: 0.7 %
BILIRUB SERPL-MCNC: 0.4 MG/DL (ref 0–1.2)
BUN SERPL-MCNC: 28 MG/DL (ref 6–23)
CALCIUM SERPL-MCNC: 9.4 MG/DL (ref 8.6–10.3)
CHLORIDE SERPL-SCNC: 96 MMOL/L (ref 98–107)
CO2 SERPL-SCNC: 28 MMOL/L (ref 21–32)
CREAT SERPL-MCNC: 4.59 MG/DL (ref 0.5–1.05)
EOSINOPHIL # BLD AUTO: 0.26 X10*3/UL (ref 0–0.4)
EOSINOPHIL NFR BLD AUTO: 3.5 %
ERYTHROCYTE [DISTWIDTH] IN BLOOD BY AUTOMATED COUNT: 14.4 % (ref 11.5–14.5)
FERRITIN SERPL-MCNC: 1890 NG/ML (ref 8–150)
GFR SERPL CREATININE-BSD FRML MDRD: 9 ML/MIN/1.73M*2
GLUCOSE SERPL-MCNC: 112 MG/DL (ref 74–99)
HCT VFR BLD AUTO: 36.6 % (ref 36–46)
HGB BLD-MCNC: 11.6 G/DL (ref 12–16)
IMM GRANULOCYTES # BLD AUTO: 0.07 X10*3/UL (ref 0–0.5)
IMM GRANULOCYTES NFR BLD AUTO: 0.9 % (ref 0–0.9)
IRON SATN MFR SERPL: 52 % (ref 25–45)
IRON SERPL-MCNC: 120 UG/DL (ref 35–150)
LYMPHOCYTES # BLD AUTO: 0.98 X10*3/UL (ref 0.8–3)
LYMPHOCYTES NFR BLD AUTO: 13.2 %
MCH RBC QN AUTO: 33.3 PG (ref 26–34)
MCHC RBC AUTO-ENTMCNC: 31.7 G/DL (ref 32–36)
MCV RBC AUTO: 105 FL (ref 80–100)
MONOCYTES # BLD AUTO: 0.84 X10*3/UL (ref 0.05–0.8)
MONOCYTES NFR BLD AUTO: 11.3 %
NEUTROPHILS # BLD AUTO: 5.25 X10*3/UL (ref 1.6–5.5)
NEUTROPHILS NFR BLD AUTO: 70.4 %
PLATELET # BLD AUTO: 344 X10*3/UL (ref 150–450)
POTASSIUM SERPL-SCNC: 4.1 MMOL/L (ref 3.5–5.3)
PROT SERPL-MCNC: 6.3 G/DL (ref 6.4–8.2)
RBC # BLD AUTO: 3.48 X10*6/UL (ref 4–5.2)
SODIUM SERPL-SCNC: 136 MMOL/L (ref 136–145)
TIBC SERPL-MCNC: 232 UG/DL (ref 240–445)
UIBC SERPL-MCNC: 112 UG/DL (ref 110–370)
WBC # BLD AUTO: 7.5 X10*3/UL (ref 4.4–11.3)

## 2023-12-14 PROCEDURE — 99214 OFFICE O/P EST MOD 30 MIN: CPT | Performed by: PHYSICIAN ASSISTANT

## 2023-12-14 PROCEDURE — 3077F SYST BP >= 140 MM HG: CPT | Performed by: PHYSICIAN ASSISTANT

## 2023-12-14 PROCEDURE — 1036F TOBACCO NON-USER: CPT | Performed by: PHYSICIAN ASSISTANT

## 2023-12-14 PROCEDURE — 85025 COMPLETE CBC W/AUTO DIFF WBC: CPT | Performed by: PHYSICIAN ASSISTANT

## 2023-12-14 PROCEDURE — 82728 ASSAY OF FERRITIN: CPT | Performed by: PHYSICIAN ASSISTANT

## 2023-12-14 PROCEDURE — 83540 ASSAY OF IRON: CPT | Performed by: PHYSICIAN ASSISTANT

## 2023-12-14 PROCEDURE — 84075 ASSAY ALKALINE PHOSPHATASE: CPT | Performed by: PHYSICIAN ASSISTANT

## 2023-12-14 PROCEDURE — 1159F MED LIST DOCD IN RCRD: CPT | Performed by: PHYSICIAN ASSISTANT

## 2023-12-14 PROCEDURE — 3078F DIAST BP <80 MM HG: CPT | Performed by: PHYSICIAN ASSISTANT

## 2023-12-14 PROCEDURE — 36415 COLL VENOUS BLD VENIPUNCTURE: CPT | Performed by: PHYSICIAN ASSISTANT

## 2023-12-14 PROCEDURE — 1125F AMNT PAIN NOTED PAIN PRSNT: CPT | Performed by: PHYSICIAN ASSISTANT

## 2023-12-14 PROCEDURE — 1111F DSCHRG MED/CURRENT MED MERGE: CPT | Performed by: PHYSICIAN ASSISTANT

## 2023-12-14 ASSESSMENT — ENCOUNTER SYMPTOMS
OCCASIONAL FEELINGS OF UNSTEADINESS: 1
LOSS OF SENSATION IN FEET: 0
DEPRESSION: 0

## 2023-12-14 ASSESSMENT — PAIN SCALES - GENERAL: PAINLEVEL: 4

## 2023-12-14 NOTE — PROGRESS NOTES
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    #2) breast cancer; 1) s/p mastectomy in 1991 s/p chemotherapy and tamoxifen. contralateral breast cancer in 2009 treated with mastectomy and arimidex for 5 years.    #3) CRI.       History of Present Illness:   Recently admitted for respiratory distress and hypertensive episode. Now feeling well.    On assessment, reports feeling well. Receives Procrit an Venofer with dialysis now. Continues on Hydrea 500 mg MWF. Tolerating well. No new complaints.      Review of Systems:    Constitutional;  No fever, chills, anorexia or weight loss  Eyes:  No blurry vision, diplopia or vision loss  ENT:  No nasal congestion, earache or sore throat  Respiratory:  No cough, SOB, hemoptysis or wheezing  Cardiac:  No Chest Pain or palpatations, dyspnea on exertion, or orthopnea  Gastrointestinal:  No abdominal pain, nausea, vomiting, diarrhea, melena, hemoptysis, or hematochezia  Genitourinary:  No dysuria, hematuria, frequency, urgency or flank pain  Musculoskeletal:  No arthralgia, myalgia, or weakness  Neurological:  No dizziness, light-headedness, headache, or syncope  Psychiatric:  No history of anxiety, depression, hallucinations  Skin:  No Rash or pruritis  Endocrine:  No heat or cold intolerance, polyuria, polydipsia  Hematologic:  No bruising     Family history is negative for first-degree relatives with cancer including breast, ovarian, colon or hematologic malignancies.     Allergies and Intolerances:       Allergies:   NKDA: Active   Tape - Adhesive, Bandaids, Paper: Environment, Rash, Active       Intolerances:   Dust: Environment, Congestion, Active   Pollen: Environment, Congestion, Active   Mold/Fungi: Environment, Congestion, Active    Outpatient Medication Profile:  * Patient  Currently Takes Medications as of 20-Dec-2022 13:21 documented in Structured Notes   losartan 50 mg oral tablet: 1 tab(s) orally once a day -.Meds to Beds , Start Date: 15-Nov-2022   hydroxyurea 500 mg oral capsule: 15 mg/kg orally once a day only on Monday, Wed, Friday. (three times per week). 90 day supply, Start Date: 2022   metoprolol tartrate 100 mg oral tablet: 1 tab(s) orally every 12 hours, Start Date: 2017   Melatonin 10 mg oral capsule: 1 cap(s) orally once a day (at bedtime)   loratadine 10 mg oral tablet: 1 tab(s) orally once a day (at bedtime)   Procrit: injectable once a month, As Needed with dialysis   triamcinolone 0.1% topical cream: for leg blotches due to swelling as needed   meclizine 25 mg oral tablet: orally once a day, As Needed   calcitriol 0.25 mcg oral capsule: 1 cap(s) orally 3 times a week on Mon, Wed, Fri   Mucinex 600 mg oral tablet, extended release: 1 tab(s) orally every 12 hours, As Needed   Saline Nasal Mist 0.65% nasal solution: 2 drop(s) nasal every 2 hours, As Needed   acetaminophen 325 mg oral tablet: 2 tab(s) orally every 6 hours, As Needed   cholecalciferol 50 mcg (2000 intl units) oral capsule: 1 cap(s) orally once a day   ondansetron 4 mg oral tablet, disintegratin tab(s) orally every 6 hours, As Needed   heparin: 1 dose(s) injectable Monday, Wednesday, and Friday   lidocaine-prilocaine 2.5%-2.5% topical cream: Apply topically to affected area Monday, Wednesday, and Friday   Triphrocaps oral capsule: 1 cap(s) orally once a day   omeprazole 20 mg oral delayed release capsule: 1 cap(s) orally once a day, As Needed   traZODone 100 mg oral tablet: 1 tab(s) orally once a day (at bedtime)   gabapentin 100 mg oral capsule: 1 cap(s) orally once a day (at bedtime), As Needed           Medical History:   Asymptomatic peripheral vascular disease: ICD-10: I73.9, Status: Active   Encounter for preoperative assessment: ICD-10: Z01.818, Status: Active   Acute worsening of  "stage 4 chronic kidney disease: ICD-10: N18.4, Status: Active   Essential thrombocytosis: ICD-10: D47.3, Status: Active   Acute on chronic heart failure: ICD-10: I50.9, Status: Active   Pneumonia due to gram-negative bacteria: ICD-10: J15.6, Status: Active   Acute respiratory failure with hypoxemia: ICD-10: J96.01, Status: Active    Family History: No Family History items are recorded in the problem list.     Social History:   Social Substance History:  · Smoking Status never smoker (1)         Performance:   ECOG Performance Status: 0 Fully Active     Visit Vitals  /70 (BP Location: Left arm)   Pulse 70   Temp 36 °C (96.8 °F)   Resp 16   Ht 1.5 m (4' 11.06\")   Wt 78.1 kg (172 lb 2.9 oz)   SpO2 94%   BMI 34.71 kg/m²   Smoking Status Never   BSA 1.8 m²     Physical Exam:     Constitutional: well appearing, RRR, CTA, abdomen soft NT, ND + BS,  no edema, no adenopathy or masses.  Strength is equal throughout all extremities.     Labs:  Lab Results   Component Value Date    WBC 7.5 12/14/2023    NEUTROABS 5.25 12/14/2023    IGABSOL 0.07 12/14/2023    LYMPHSABS 0.98 12/14/2023    MONOSABS 0.84 (H) 12/14/2023    EOSABS 0.26 12/14/2023    BASOSABS 0.05 12/14/2023    RBC 3.48 (L) 12/14/2023     (H) 12/14/2023    MCHC 31.7 (L) 12/14/2023    HGB 11.6 (L) 12/14/2023    HCT 36.6 12/14/2023     12/14/2023     No results found for: \"RETICCTPCT\"   Lab Results   Component Value Date    CREATININE 4.59 (H) 12/14/2023    BUN 28 (H) 12/14/2023    EGFR 9 (L) 12/14/2023     12/14/2023    K 4.1 12/14/2023    CL 96 (L) 12/14/2023    CO2 28 12/14/2023      Lab Results   Component Value Date    ALT 20 12/14/2023    AST 17 12/14/2023     (H) 12/04/2023    ALKPHOS 223 (H) 12/14/2023    BILITOT 0.4 12/14/2023      Lab Results   Component Value Date    TSH 2.27 02/10/2023     Lab Results   Component Value Date    TSH 2.27 02/10/2023     Lab Results   Component Value Date    IRON 120 12/14/2023    TIBC 232 (L) " "12/14/2023    FERRITIN 1,890 (H) 12/14/2023      Lab Results   Component Value Date    GPZIWWUI62 874 12/20/2022      Lab Results   Component Value Date    FOLATE 17.1 11/02/2017     Lab Results   Component Value Date    SEDRATE 63 (H) 10/12/2021      Lab Results   Component Value Date    CRP 1.67 (A) 10/12/2021      No results found for: \"MEHRDAD\"  Lab Results   Component Value Date     (H) 12/20/2022     Lab Results   Component Value Date    HAPTOGLOBIN 67 01/16/2018     Lab Results   Component Value Date    SPEP NORMAL 12/20/2022     Assessment:    73 yo with history of myeloproliferative disease which appears from the records to be mostly like essential thrombocytosis for which she has been on anagrelide for many years with good control of her platelets.     Currently on Hydrea 500 mg MWF due to recall of Anagrelide 0.5 mg. Platelets at goal.     Anemia from CRI/ESRD, FTN > 100. Getting Procrit PRN at dialysis; goal is HgB 10-11    Follow up pulmonary and cardiology.     Ferritin >1000; HFE c/w H63D heterozygous. Discussed limiting the amount of iron she receives at dialysis. Will reach out to renal.     Continue to monitor CBC and Ferritin/iron studies every 3 months    RTC in 6 months    Patient verbalized understanding, and all her questions were answered to her satisfaction    30 min spent with patient greater than 50 % of which was spent in consultation and coordination of care.    "

## 2023-12-19 ENCOUNTER — TELEPHONE (OUTPATIENT)
Dept: HEMATOLOGY/ONCOLOGY | Facility: CLINIC | Age: 78
End: 2023-12-19
Payer: MEDICARE

## 2024-01-22 ENCOUNTER — APPOINTMENT (OUTPATIENT)
Dept: RADIOLOGY | Facility: HOSPITAL | Age: 79
End: 2024-01-22
Payer: MEDICARE

## 2024-01-22 ENCOUNTER — APPOINTMENT (OUTPATIENT)
Dept: CARDIOLOGY | Facility: HOSPITAL | Age: 79
End: 2024-01-22
Payer: MEDICARE

## 2024-01-22 ENCOUNTER — HOSPITAL ENCOUNTER (OUTPATIENT)
Facility: HOSPITAL | Age: 79
Setting detail: OBSERVATION
Discharge: NURSING, REHAB OR LONG-TERM CARE | End: 2024-01-23
Attending: STUDENT IN AN ORGANIZED HEALTH CARE EDUCATION/TRAINING PROGRAM | Admitting: STUDENT IN AN ORGANIZED HEALTH CARE EDUCATION/TRAINING PROGRAM
Payer: MEDICARE

## 2024-01-22 DIAGNOSIS — I10 BENIGN ESSENTIAL HYPERTENSION: ICD-10-CM

## 2024-01-22 DIAGNOSIS — I50.32 CHRONIC DIASTOLIC HEART FAILURE (MULTI): ICD-10-CM

## 2024-01-22 DIAGNOSIS — R07.89 OTHER CHEST PAIN: Primary | ICD-10-CM

## 2024-01-22 PROBLEM — R07.9 CHEST PAIN: Status: ACTIVE | Noted: 2024-01-22

## 2024-01-22 LAB
ALBUMIN SERPL BCP-MCNC: 3.8 G/DL (ref 3.4–5)
ALP SERPL-CCNC: 184 U/L (ref 33–136)
ALT SERPL W P-5'-P-CCNC: 20 U/L (ref 7–45)
ANION GAP SERPL CALC-SCNC: 13 MMOL/L (ref 10–20)
AST SERPL W P-5'-P-CCNC: 21 U/L (ref 9–39)
BASOPHILS # BLD AUTO: 0.07 X10*3/UL (ref 0–0.1)
BASOPHILS NFR BLD AUTO: 0.7 %
BILIRUB SERPL-MCNC: 0.4 MG/DL (ref 0–1.2)
BNP SERPL-MCNC: 605 PG/ML (ref 0–99)
BUN SERPL-MCNC: 33 MG/DL (ref 6–23)
CALCIUM SERPL-MCNC: 9.4 MG/DL (ref 8.6–10.3)
CARDIAC TROPONIN I PNL SERPL HS: 19 NG/L (ref 0–13)
CARDIAC TROPONIN I PNL SERPL HS: 23 NG/L (ref 0–13)
CARDIAC TROPONIN I PNL SERPL HS: 24 NG/L (ref 0–13)
CARDIAC TROPONIN I PNL SERPL HS: 24 NG/L (ref 0–13)
CHLORIDE SERPL-SCNC: 94 MMOL/L (ref 98–107)
CO2 SERPL-SCNC: 29 MMOL/L (ref 21–32)
CREAT SERPL-MCNC: 4.61 MG/DL (ref 0.5–1.05)
EGFRCR SERPLBLD CKD-EPI 2021: 9 ML/MIN/1.73M*2
EOSINOPHIL # BLD AUTO: 0.36 X10*3/UL (ref 0–0.4)
EOSINOPHIL NFR BLD AUTO: 3.7 %
ERYTHROCYTE [DISTWIDTH] IN BLOOD BY AUTOMATED COUNT: 14.7 % (ref 11.5–14.5)
GLUCOSE SERPL-MCNC: 118 MG/DL (ref 74–99)
HCT VFR BLD AUTO: 34.6 % (ref 36–46)
HGB BLD-MCNC: 11.5 G/DL (ref 12–16)
IMM GRANULOCYTES # BLD AUTO: 0.05 X10*3/UL (ref 0–0.5)
IMM GRANULOCYTES NFR BLD AUTO: 0.5 % (ref 0–0.9)
LYMPHOCYTES # BLD AUTO: 1.09 X10*3/UL (ref 0.8–3)
LYMPHOCYTES NFR BLD AUTO: 11.2 %
MAGNESIUM SERPL-MCNC: 1.88 MG/DL (ref 1.6–2.4)
MCH RBC QN AUTO: 33.7 PG (ref 26–34)
MCHC RBC AUTO-ENTMCNC: 33.2 G/DL (ref 32–36)
MCV RBC AUTO: 102 FL (ref 80–100)
MONOCYTES # BLD AUTO: 1.06 X10*3/UL (ref 0.05–0.8)
MONOCYTES NFR BLD AUTO: 10.9 %
NEUTROPHILS # BLD AUTO: 7.11 X10*3/UL (ref 1.6–5.5)
NEUTROPHILS NFR BLD AUTO: 73 %
NRBC BLD-RTO: 0 /100 WBCS (ref 0–0)
PLATELET # BLD AUTO: 397 X10*3/UL (ref 150–450)
POTASSIUM SERPL-SCNC: 3.6 MMOL/L (ref 3.5–5.3)
PROT SERPL-MCNC: 6.4 G/DL (ref 6.4–8.2)
RBC # BLD AUTO: 3.41 X10*6/UL (ref 4–5.2)
SODIUM SERPL-SCNC: 132 MMOL/L (ref 136–145)
WBC # BLD AUTO: 9.7 X10*3/UL (ref 4.4–11.3)

## 2024-01-22 PROCEDURE — 99285 EMERGENCY DEPT VISIT HI MDM: CPT | Performed by: STUDENT IN AN ORGANIZED HEALTH CARE EDUCATION/TRAINING PROGRAM

## 2024-01-22 PROCEDURE — 85025 COMPLETE CBC W/AUTO DIFF WBC: CPT | Performed by: STUDENT IN AN ORGANIZED HEALTH CARE EDUCATION/TRAINING PROGRAM

## 2024-01-22 PROCEDURE — 2500000001 HC RX 250 WO HCPCS SELF ADMINISTERED DRUGS (ALT 637 FOR MEDICARE OP): Performed by: INTERNAL MEDICINE

## 2024-01-22 PROCEDURE — 84484 ASSAY OF TROPONIN QUANT: CPT | Performed by: INTERNAL MEDICINE

## 2024-01-22 PROCEDURE — G0378 HOSPITAL OBSERVATION PER HR: HCPCS

## 2024-01-22 PROCEDURE — 93005 ELECTROCARDIOGRAM TRACING: CPT

## 2024-01-22 PROCEDURE — 36415 COLL VENOUS BLD VENIPUNCTURE: CPT | Performed by: STUDENT IN AN ORGANIZED HEALTH CARE EDUCATION/TRAINING PROGRAM

## 2024-01-22 PROCEDURE — 71046 X-RAY EXAM CHEST 2 VIEWS: CPT

## 2024-01-22 PROCEDURE — 83880 ASSAY OF NATRIURETIC PEPTIDE: CPT | Performed by: STUDENT IN AN ORGANIZED HEALTH CARE EDUCATION/TRAINING PROGRAM

## 2024-01-22 PROCEDURE — 83735 ASSAY OF MAGNESIUM: CPT | Performed by: STUDENT IN AN ORGANIZED HEALTH CARE EDUCATION/TRAINING PROGRAM

## 2024-01-22 PROCEDURE — 84484 ASSAY OF TROPONIN QUANT: CPT | Performed by: STUDENT IN AN ORGANIZED HEALTH CARE EDUCATION/TRAINING PROGRAM

## 2024-01-22 PROCEDURE — 71046 X-RAY EXAM CHEST 2 VIEWS: CPT | Mod: FOREIGN READ | Performed by: RADIOLOGY

## 2024-01-22 PROCEDURE — 80053 COMPREHEN METABOLIC PANEL: CPT | Performed by: STUDENT IN AN ORGANIZED HEALTH CARE EDUCATION/TRAINING PROGRAM

## 2024-01-22 RX ORDER — HYDROMORPHONE HYDROCHLORIDE 1 MG/ML
0.6 INJECTION, SOLUTION INTRAMUSCULAR; INTRAVENOUS; SUBCUTANEOUS
Status: DISCONTINUED | OUTPATIENT
Start: 2024-01-22 | End: 2024-01-23

## 2024-01-22 RX ORDER — CARVEDILOL 25 MG/1
25 TABLET ORAL
Status: DISCONTINUED | OUTPATIENT
Start: 2024-01-23 | End: 2024-01-23 | Stop reason: HOSPADM

## 2024-01-22 RX ORDER — CALCITRIOL 0.25 UG/1
0.25 CAPSULE ORAL
Status: DISCONTINUED | OUTPATIENT
Start: 2024-01-22 | End: 2024-01-23 | Stop reason: HOSPADM

## 2024-01-22 RX ORDER — OXYCODONE HYDROCHLORIDE 5 MG/1
10 TABLET ORAL EVERY 6 HOURS PRN
Status: DISCONTINUED | OUTPATIENT
Start: 2024-01-22 | End: 2024-01-23

## 2024-01-22 RX ORDER — OXYCODONE HYDROCHLORIDE 5 MG/1
5 TABLET ORAL EVERY 6 HOURS PRN
Status: DISCONTINUED | OUTPATIENT
Start: 2024-01-22 | End: 2024-01-23

## 2024-01-22 RX ORDER — AMOXICILLIN 250 MG
1 CAPSULE ORAL NIGHTLY
Status: DISCONTINUED | OUTPATIENT
Start: 2024-01-22 | End: 2024-01-23 | Stop reason: HOSPADM

## 2024-01-22 RX ORDER — CALCIUM CARBONATE 200(500)MG
500 TABLET,CHEWABLE ORAL
Status: DISCONTINUED | OUTPATIENT
Start: 2024-01-22 | End: 2024-01-23 | Stop reason: HOSPADM

## 2024-01-22 RX ORDER — ERYTHROPOIETIN 2000 [IU]/ML
1 INJECTION, SOLUTION INTRAVENOUS; SUBCUTANEOUS
COMMUNITY

## 2024-01-22 RX ORDER — AMITRIPTYLINE HYDROCHLORIDE 10 MG/1
10 TABLET, FILM COATED ORAL DAILY
Status: DISCONTINUED | OUTPATIENT
Start: 2024-01-22 | End: 2024-01-23 | Stop reason: HOSPADM

## 2024-01-22 RX ORDER — AMLODIPINE BESYLATE 5 MG/1
5 TABLET ORAL DAILY
Status: DISCONTINUED | OUTPATIENT
Start: 2024-01-22 | End: 2024-01-23 | Stop reason: HOSPADM

## 2024-01-22 RX ORDER — MECLIZINE HYDROCHLORIDE 25 MG/1
25 TABLET ORAL DAILY PRN
Status: DISCONTINUED | OUTPATIENT
Start: 2024-01-22 | End: 2024-01-23 | Stop reason: HOSPADM

## 2024-01-22 RX ORDER — GABAPENTIN 100 MG/1
100 CAPSULE ORAL NIGHTLY PRN
Status: DISCONTINUED | OUTPATIENT
Start: 2024-01-22 | End: 2024-01-23 | Stop reason: HOSPADM

## 2024-01-22 RX ORDER — LOSARTAN POTASSIUM 50 MG/1
100 TABLET ORAL DAILY
Status: DISCONTINUED | OUTPATIENT
Start: 2024-01-22 | End: 2024-01-23 | Stop reason: HOSPADM

## 2024-01-22 RX ORDER — PANTOPRAZOLE SODIUM 40 MG/1
40 TABLET, DELAYED RELEASE ORAL
Status: DISCONTINUED | OUTPATIENT
Start: 2024-01-23 | End: 2024-01-23 | Stop reason: HOSPADM

## 2024-01-22 RX ORDER — HEPARIN SODIUM 5000 [USP'U]/ML
5000 INJECTION, SOLUTION INTRAVENOUS; SUBCUTANEOUS EVERY 8 HOURS
Status: DISCONTINUED | OUTPATIENT
Start: 2024-01-22 | End: 2024-01-23 | Stop reason: HOSPADM

## 2024-01-22 RX ORDER — HYDROXYUREA 500 MG/1
500 CAPSULE ORAL
Status: DISCONTINUED | OUTPATIENT
Start: 2024-01-22 | End: 2024-01-23 | Stop reason: HOSPADM

## 2024-01-22 RX ORDER — ACETAMINOPHEN 500 MG
5 TABLET ORAL NIGHTLY
Status: DISCONTINUED | OUTPATIENT
Start: 2024-01-22 | End: 2024-01-23 | Stop reason: HOSPADM

## 2024-01-22 RX ORDER — LORATADINE 10 MG/1
10 TABLET ORAL DAILY PRN
Status: DISCONTINUED | OUTPATIENT
Start: 2024-01-22 | End: 2024-01-23 | Stop reason: HOSPADM

## 2024-01-22 RX ORDER — DEXTROSE 50 % IN WATER (D50W) INTRAVENOUS SYRINGE
25
Status: DISCONTINUED | OUTPATIENT
Start: 2024-01-22 | End: 2024-01-23

## 2024-01-22 RX ORDER — INSULIN LISPRO 100 [IU]/ML
0-15 INJECTION, SOLUTION INTRAVENOUS; SUBCUTANEOUS
Status: DISCONTINUED | OUTPATIENT
Start: 2024-01-22 | End: 2024-01-23

## 2024-01-22 RX ORDER — FLUTICASONE PROPIONATE 50 MCG
2 SPRAY, SUSPENSION (ML) NASAL DAILY
Status: DISCONTINUED | OUTPATIENT
Start: 2024-01-22 | End: 2024-01-23 | Stop reason: HOSPADM

## 2024-01-22 RX ORDER — DEXTROSE MONOHYDRATE 100 MG/ML
0.3 INJECTION, SOLUTION INTRAVENOUS ONCE AS NEEDED
Status: DISCONTINUED | OUTPATIENT
Start: 2024-01-22 | End: 2024-01-23

## 2024-01-22 RX ORDER — LABETALOL HYDROCHLORIDE 5 MG/ML
20 INJECTION, SOLUTION INTRAVENOUS
Status: ACTIVE | OUTPATIENT
Start: 2024-01-22 | End: 2024-01-22

## 2024-01-22 RX ORDER — ONDANSETRON HYDROCHLORIDE 2 MG/ML
4 INJECTION, SOLUTION INTRAVENOUS EVERY 8 HOURS PRN
Status: DISCONTINUED | OUTPATIENT
Start: 2024-01-22 | End: 2024-01-23 | Stop reason: HOSPADM

## 2024-01-22 RX ORDER — POLYETHYLENE GLYCOL 3350 17 G/17G
17 POWDER, FOR SOLUTION ORAL DAILY
Status: DISCONTINUED | OUTPATIENT
Start: 2024-01-22 | End: 2024-01-23 | Stop reason: HOSPADM

## 2024-01-22 RX ADMIN — AMITRIPTYLINE HYDROCHLORIDE 10 MG: 10 TABLET, FILM COATED ORAL at 20:54

## 2024-01-22 RX ADMIN — Medication 5 MG: at 20:54

## 2024-01-22 SDOH — SOCIAL STABILITY: SOCIAL INSECURITY: WERE YOU ABLE TO COMPLETE ALL THE BEHAVIORAL HEALTH SCREENINGS?: YES

## 2024-01-22 SDOH — SOCIAL STABILITY: SOCIAL INSECURITY: DO YOU FEEL UNSAFE GOING BACK TO THE PLACE WHERE YOU ARE LIVING?: NO

## 2024-01-22 SDOH — SOCIAL STABILITY: SOCIAL INSECURITY: ARE YOU OR HAVE YOU BEEN THREATENED OR ABUSED PHYSICALLY, EMOTIONALLY, OR SEXUALLY BY ANYONE?: NO

## 2024-01-22 SDOH — SOCIAL STABILITY: SOCIAL INSECURITY: DO YOU FEEL ANYONE HAS EXPLOITED OR TAKEN ADVANTAGE OF YOU FINANCIALLY OR OF YOUR PERSONAL PROPERTY?: NO

## 2024-01-22 SDOH — SOCIAL STABILITY: SOCIAL INSECURITY: HAS ANYONE EVER THREATENED TO HURT YOUR FAMILY OR YOUR PETS?: NO

## 2024-01-22 SDOH — SOCIAL STABILITY: SOCIAL INSECURITY: HAVE YOU HAD THOUGHTS OF HARMING ANYONE ELSE?: NO

## 2024-01-22 SDOH — SOCIAL STABILITY: SOCIAL INSECURITY: DOES ANYONE TRY TO KEEP YOU FROM HAVING/CONTACTING OTHER FRIENDS OR DOING THINGS OUTSIDE YOUR HOME?: NO

## 2024-01-22 SDOH — SOCIAL STABILITY: SOCIAL INSECURITY: ARE THERE ANY APPARENT SIGNS OF INJURIES/BEHAVIORS THAT COULD BE RELATED TO ABUSE/NEGLECT?: NO

## 2024-01-22 SDOH — SOCIAL STABILITY: SOCIAL INSECURITY: ABUSE: ADULT

## 2024-01-22 ASSESSMENT — PAIN - FUNCTIONAL ASSESSMENT
PAIN_FUNCTIONAL_ASSESSMENT: 0-10
PAIN_FUNCTIONAL_ASSESSMENT: 0-10

## 2024-01-22 ASSESSMENT — LIFESTYLE VARIABLES
HAVE PEOPLE ANNOYED YOU BY CRITICIZING YOUR DRINKING: NO
SKIP TO QUESTIONS 9-10: 1
HAVE YOU EVER FELT YOU SHOULD CUT DOWN ON YOUR DRINKING: NO
AUDIT-C TOTAL SCORE: 0
HOW OFTEN DO YOU HAVE 6 OR MORE DRINKS ON ONE OCCASION: NEVER
EVER HAD A DRINK FIRST THING IN THE MORNING TO STEADY YOUR NERVES TO GET RID OF A HANGOVER: NO
REASON UNABLE TO ASSESS: NO
HOW MANY STANDARD DRINKS CONTAINING ALCOHOL DO YOU HAVE ON A TYPICAL DAY: PATIENT DOES NOT DRINK
AUDIT-C TOTAL SCORE: 0
EVER FELT BAD OR GUILTY ABOUT YOUR DRINKING: NO
HOW OFTEN DO YOU HAVE A DRINK CONTAINING ALCOHOL: NEVER

## 2024-01-22 ASSESSMENT — ACTIVITIES OF DAILY LIVING (ADL)
HEARING - LEFT EAR: FUNCTIONAL
FEEDING YOURSELF: NEEDS ASSISTANCE
ASSISTIVE_DEVICE: WALKER
GROOMING: NEEDS ASSISTANCE
HEARING - RIGHT EAR: FUNCTIONAL
ADEQUATE_TO_COMPLETE_ADL: YES
JUDGMENT_ADEQUATE_SAFELY_COMPLETE_DAILY_ACTIVITIES: YES
BATHING: NEEDS ASSISTANCE
DRESSING YOURSELF: NEEDS ASSISTANCE
PATIENT'S MEMORY ADEQUATE TO SAFELY COMPLETE DAILY ACTIVITIES?: YES
TOILETING: NEEDS ASSISTANCE
LACK_OF_TRANSPORTATION: NO
WALKS IN HOME: NEEDS ASSISTANCE

## 2024-01-22 ASSESSMENT — PAIN SCALES - GENERAL
PAINLEVEL_OUTOF10: 3
PAINLEVEL_OUTOF10: 3
PAINLEVEL_OUTOF10: 0 - NO PAIN

## 2024-01-22 ASSESSMENT — PAIN DESCRIPTION - PAIN TYPE: TYPE: ACUTE PAIN

## 2024-01-22 ASSESSMENT — COGNITIVE AND FUNCTIONAL STATUS - GENERAL
DRESSING REGULAR LOWER BODY CLOTHING: A LOT
DRESSING REGULAR UPPER BODY CLOTHING: A LOT
PATIENT BASELINE BEDBOUND: NO
MOBILITY SCORE: 14
HELP NEEDED FOR BATHING: A LOT
MOVING FROM LYING ON BACK TO SITTING ON SIDE OF FLAT BED WITH BEDRAILS: A LITTLE
PERSONAL GROOMING: A LITTLE
CLIMB 3 TO 5 STEPS WITH RAILING: TOTAL
DAILY ACTIVITIY SCORE: 15
TOILETING: A LOT
WALKING IN HOSPITAL ROOM: A LOT
MOVING TO AND FROM BED TO CHAIR: A LITTLE
STANDING UP FROM CHAIR USING ARMS: A LOT
TURNING FROM BACK TO SIDE WHILE IN FLAT BAD: A LITTLE

## 2024-01-22 ASSESSMENT — PATIENT HEALTH QUESTIONNAIRE - PHQ9
2. FEELING DOWN, DEPRESSED OR HOPELESS: NOT AT ALL
SUM OF ALL RESPONSES TO PHQ9 QUESTIONS 1 & 2: 0
1. LITTLE INTEREST OR PLEASURE IN DOING THINGS: NOT AT ALL

## 2024-01-22 ASSESSMENT — PAIN DESCRIPTION - DESCRIPTORS
DESCRIPTORS: DISCOMFORT
DESCRIPTORS: DISCOMFORT

## 2024-01-22 ASSESSMENT — COLUMBIA-SUICIDE SEVERITY RATING SCALE - C-SSRS
2. HAVE YOU ACTUALLY HAD ANY THOUGHTS OF KILLING YOURSELF?: NO
1. IN THE PAST MONTH, HAVE YOU WISHED YOU WERE DEAD OR WISHED YOU COULD GO TO SLEEP AND NOT WAKE UP?: NO
6. HAVE YOU EVER DONE ANYTHING, STARTED TO DO ANYTHING, OR PREPARED TO DO ANYTHING TO END YOUR LIFE?: NO

## 2024-01-22 ASSESSMENT — PAIN DESCRIPTION - ORIENTATION: ORIENTATION: LEFT

## 2024-01-22 ASSESSMENT — PAIN DESCRIPTION - LOCATION: LOCATION: CHEST

## 2024-01-22 NOTE — ED PROVIDER NOTES
HPI   Chief Complaint   Patient presents with    Chest Pain       HPI  78-year-old female with history of ESRD on HD MWF, HFpEF, chronic respiratory failure on 2L NC b/l, hypertension, hyperlipidemia who presents with chest pain.  Patient was at her dialysis session this morning when she became lightheaded, clammy, with associated chest pressure FCI through her session.  Pain resolved upon discontinuation of her hemodialysis session and following 1 nitroglycerin prehospital.  At the time my exam, patient denies chest pain, shortness of breath, nausea, vomiting, diaphoresis, fevers, chills, cough.                  [place] Coma Scale Score: 15                  Patient History   Past Medical History:   Diagnosis Date    Body mass index (BMI) 31.0-31.9, adult 04/12/2019    BMI 31.0-31.9,adult    Encounter for follow-up examination after completed treatment for conditions other than malignant neoplasm 11/30/2017    Hospital discharge follow-up    Other conditions influencing health status 03/12/2019    History of cough    Other specified disorders of bone, unspecified site 03/11/2015    Abnormal bone formation    Personal history of malignant neoplasm of breast 02/05/2014    Personal history of breast cancer    Personal history of malignant neoplasm, unspecified     History of malignant neoplasm    Personal history of other diseases of the circulatory system     History of hypertension    Personal history of other diseases of the digestive system     History of gastroesophageal reflux (GERD)    Personal history of other diseases of the musculoskeletal system and connective tissue 01/21/2016    History of trigger finger    Personal history of other diseases of the musculoskeletal system and connective tissue     History of arthritis    Personal history of other diseases of the nervous system and sense organs     History of sleep apnea    Personal history of other diseases of urinary system     History of kidney  disease    Respiratory failure, unspecified with hypoxia (CMS/HCC) 12/29/2017    Respiratory failure with hypoxia    Shortness of breath 10/06/2017    Exertional shortness of breath    Unspecified cataract     Cataracts, bilateral    Unspecified fracture of unspecified forearm, sequela 10/04/2018    Forearm fracture, sequela     Past Surgical History:   Procedure Laterality Date    COLONOSCOPY  02/05/2014    Colonoscopy (Fiberoptic)    IR CVC TUNNELED  10/12/2021    IR CVC TUNNELED 10/12/2021 [facility]    KNEE ARTHROSCOPY W/ MENISCAL REPAIR  04/01/2014    Knee Arthroscopy With Medial Meniscus Repair    MASTECTOMY  02/05/2014    Breast Surgery Mastectomy    MR HEAD ANGIO WO IV CONTRAST  2/23/2019    MR HEAD ANGIO WO IV CONTRAST 2/23/2019 [facility]    MR NECK ANGIO WO IV CONTRAST  2/23/2019    MR NECK ANGIO WO IV CONTRAST 2/23/2019 [facility]    OTHER SURGICAL HISTORY  09/21/2021    Arteriovenous fistula creation procedure    ROTATOR CUFF REPAIR  04/01/2014    Rotator Cuff Repair    TONSILLECTOMY  02/05/2014    Tonsillectomy     Family History   Problem Relation Name Age of Onset    Hypertension Mother      Leukemia Mother      Osteoarthritis Mother      Colon cancer Father      Diabetes Father      Hypertension Father      Osteoarthritis Sister      Osteoarthritis Other Aunt     Stroke Other Aunt     Stroke Other Uncle     Breast cancer Other Family History      Social History     Tobacco Use    Smoking status: Never    Smokeless tobacco: Never   Vaping Use    Vaping Use: Never used   Substance Use Topics    Alcohol use: Never    Drug use: Never       Physical Exam   ED Triage Vitals [01/22/24 1328]   Temp Heart Rate Respirations BP   36.6 °C (97.9 °F) 70 16 167/59      Pulse Ox Temp Source Heart Rate Source Patient Position   100 % Tympanic Monitor Sitting      BP Location FiO2 (%)     Left arm --       Physical Exam  Vitals and nursing note reviewed.   Constitutional:        Appearance: Normal appearance.      Interventions: Nasal cannula in place.   HENT:      Head: Normocephalic.      Mouth/Throat:      Mouth: Mucous membranes are moist.   Eyes:      Conjunctiva/sclera: Conjunctivae normal.   Cardiovascular:      Rate and Rhythm: Normal rate and regular rhythm.   Pulmonary:      Effort: Pulmonary effort is normal.      Breath sounds: Normal breath sounds.   Abdominal:      General: Abdomen is flat.      Palpations: Abdomen is soft.   Musculoskeletal:      Right lower leg: No tenderness. No edema.      Left lower leg: No tenderness. No edema.   Neurological:      Mental Status: She is alert and oriented to person, place, and time.   Psychiatric:         Mood and Affect: Mood normal.         ED Course & MDM   ED Course as of 01/22/24 1704   Mon Jan 22, 2024   1350 Troponin I, High Sensitivity(!): 24 [JIMMY]   1350 BNP(!): 605 [JIMMY]   1350 Creatinine(!): 4.61  Baseline in setting of known ESRD [JIMMY]   1356 ECG 12 lead  Rate 69 bpm, sinus rhythm, normal axis.  Mildly prolonged QTc interval similar from prior EKG from December 4, 2023.  T wave inversions in leads aVR and V1 which are normal.  No appreciable ST elevations or depressions.  Evidence of LVH with large R wave in aVL, again similar from prior EKG.  Impression: Sinus rhythm with mildly prolonged QTc interval and evidence of LVH, overall similar from prior EKG [JIMMY]   1444 XR chest 2 views  No consolidation or effusion [JIMMY]   1522 Troponin I, High Sensitivity(!): 24  Stable, unchanged [JIMMY]      ED Course User Index  [JIMMY] Jacky Chow DO         Diagnoses as of 01/22/24 1704   Other chest pain       Medical Decision Making  78-year-old female with history of ESRD on HD MWF, HFpEF, chronic respiratory failure on 2L NC b/l, hypertension, hyperlipidemia who presents with chest pain with dialysis prior to arrival.  On exam, patient is hypertensive but otherwise vitals are within normal limits, she is satting well on baseline 2L NC, no  acute distress and nontoxic-appearing, lungs are clear, abdomen is soft nontender, she has equal 2+ radial pulses bilaterally, no significant lower extremity pitting edema.  Patient's story is concerning with chest pressure and diaphoresis along with multiple risk factors.  We will proceed with EKG, cardiac enzymes, basic labs, chest x-ray.    EKG was nonischemic, as above, chest x-ray showed no consolidation or effusion, troponin was mildly elevated and remained stable on repeat.  Given concerning story with risk factors we opted to admit the patient for observation and potentially provocative testing.  Patient otherwise remained stable and was admitted.    Procedure  Procedures     Jacky Chow DO  Resident  01/22/24 [id number]

## 2024-01-22 NOTE — PROGRESS NOTES
"Patient: Joyce Bethea  Room/bed: AC17/AC17  Admitted on: 1/22/2024    Age: 78 y.o. Gender: Female  Code Status:  DNR and No Intubation   Admitting Dx: No admission diagnoses are documented for this encounter.    MRN: [id number]  PCP: ANASTASIA Galvin-CNP  [facility]erred Pharm: [facility]    Attending: Waldemar Hernandez DO Resident: Manuel Duran DO  TCC: No care team member to display      Chief Complaint   Patient: Joyce Bethea is a 78 y.o. female who presented to the hospital for chest pain.    HPI   HPI: Joyce Bethea is a 78 y.o. female w/ pmhx of HFpEF and ESRD with HD (MWF) who presented to the hospital for chest pain that started at approximately noon during her hemodialysis. During patient's dialysis session, she started feeling clammy and experiencing blurred vision. She started to feel herself receding as if she were going to pass out. She began experiencing chest pain \"across [her] breastbone\" that did not radiate and was coupled with both \"a little bit\" of pressure and shortness of breath. She describes this pain as feeling as if she was kicked in the chest by a horse. The dialysis team stopped her dialysis 2 hours into her 4 hour session due to these symptoms and gave her sublingual nitroglycerin. After this, her chest pain gradually dissipated.    Of note, patient states she has experienced this chest pain before, at least 4-5 times before, about once per year.    ROS  Review of Systems   All other systems reviewed and are negative.    PMHx - HTN, HLD, breast cancer s/p b/l mastectomy (1991 and 2009), ESRD on HD (MWF), HEATH on CPAP (prefers home mask due to having broken the bridge of her nose), chronic hypoxic respiratory failure (2 L baseline and now requiring 3 L on exertion), essential thrombocytosis, HFpEF (EF 55-60%), chronic anemia  SuHx - double mastectomy, lipoma removed from ankle, Rt rotator cuff repair  Social - Tobacco: denies / EtOH - 2 oz of wine on holidays / " "Illicit - denies    ED Course   Vitals - /59 (BP Location: Left arm, Patient Position: Sitting)   Pulse 73   Temp 36.6 °C (97.9 °F) (Tympanic)   Resp 19   Wt 83.7 kg (184 lb 8.4 oz)   SpO2 100%   Labs -   Lab Results   Component Value Date    WBC 9.7 01/22/2024    HGB 11.5 (L) 01/22/2024    HCT 34.6 (L) 01/22/2024     (H) 01/22/2024     01/22/2024     Lab Results   Component Value Date    GLUCOSE 118 (H) 01/22/2024    CALCIUM 9.4 01/22/2024     (L) 01/22/2024    K 3.6 01/22/2024    CO2 29 01/22/2024    CL 94 (L) 01/22/2024    BUN 33 (H) 01/22/2024    CREATININE 4.61 (H) 01/22/2024     Lab Results   Component Value Date    TROPHS 24 (H) 01/22/2024     Lab Results   Component Value Date     (H) 01/22/2024   No results found for: \"DDIMERVTE\"  Imaging -   XR chest 2 views   Final Result   Minimal cardiomegaly. No infiltrates or vascular congestion. Remainder   as above.   Signed by Arutro Sy MD         Interventions -   Medications   heparin (porcine) injection 5,000 Units (has no administration in time range)   pantoprazole (ProtoNix) EC tablet 40 mg (has no administration in time range)   ondansetron (Zofran) injection 4 mg (has no administration in time range)   polyethylene glycol (Glycolax, Miralax) packet 17 g (has no administration in time range)       Past Medical History     Past Medical History:   Diagnosis Date    Body mass index (BMI) 31.0-31.9, adult 04/12/2019    BMI 31.0-31.9,adult    Encounter for follow-up examination after completed treatment for conditions other than malignant neoplasm 11/30/2017    Hospital discharge follow-up    Other conditions influencing health status 03/12/2019    History of cough    Other specified disorders of bone, unspecified site 03/11/2015    Abnormal bone formation    Personal history of malignant neoplasm of breast 02/05/2014    Personal history of breast cancer    Personal history of malignant neoplasm, unspecified     History of " malignant neoplasm    Personal history of other diseases of the circulatory system     History of hypertension    Personal history of other diseases of the digestive system     History of gastroesophageal reflux (GERD)    Personal history of other diseases of the musculoskeletal system and connective tissue 01/21/2016    History of trigger finger    Personal history of other diseases of the musculoskeletal system and connective tissue     History of arthritis    Personal history of other diseases of the nervous system and sense organs     History of sleep apnea    Personal history of other diseases of urinary system     History of kidney disease    Respiratory failure, unspecified with hypoxia (CMS/HCC) 12/29/2017    Respiratory failure with hypoxia    Shortness of breath 10/06/2017    Exertional shortness of breath    Unspecified cataract     Cataracts, bilateral    Unspecified fracture of unspecified forearm, sequela 10/04/2018    Forearm fracture, sequela        Surgical History     Past Surgical History:   Procedure Laterality Date    COLONOSCOPY  02/05/2014    Colonoscopy (Fiberoptic)    IR CVC TUNNELED  10/12/2021    IR CVC TUNNELED 10/12/2021 [facility]    KNEE ARTHROSCOPY W/ MENISCAL REPAIR  04/01/2014    Knee Arthroscopy With Medial Meniscus Repair    MASTECTOMY  02/05/2014    Breast Surgery Mastectomy    MR HEAD ANGIO WO IV CONTRAST  2/23/2019    MR HEAD ANGIO WO IV CONTRAST 2/23/2019 [facility]    MR NECK ANGIO WO IV CONTRAST  2/23/2019    MR NECK ANGIO WO IV CONTRAST 2/23/2019 [facility]    OTHER SURGICAL HISTORY  09/21/2021    Arteriovenous fistula creation procedure    ROTATOR CUFF REPAIR  04/01/2014    Rotator Cuff Repair    TONSILLECTOMY  02/05/2014    Tonsillectomy       Family History     Family History   Problem Relation Name Age of Onset    Hypertension Mother      Leukemia Mother      Osteoarthritis Mother      Colon cancer Father      Diabetes Father       Hypertension Father      Osteoarthritis Sister      Osteoarthritis Other Aunt     Stroke Other Aunt     Stroke Other Uncle     Breast cancer Other Family History        Social History     Social History     Socioeconomic History    Marital status: Single     Spouse name: Not on file    Number of children: Not on file    Years of education: Not on file    Highest education level: Not on file   Occupational History    Not on file   Tobacco Use    Smoking status: Never    Smokeless tobacco: Never   Vaping Use    Vaping Use: Never used   Substance and Sexual Activity    Alcohol use: Never    Drug use: Never    Sexual activity: Not on file   Other Topics Concern    Not on file   Social History Narrative    Not on file     Social Determinants of Health     Financial Resource Strain: Low Risk  (12/4/2023)    Overall Financial Resource Strain (CARDIA)     Difficulty of Paying Living Expenses: Not hard at all   Food Insecurity: Not on file   Transportation Needs: No Transportation Needs (12/4/2023)    PRAPARE - Transportation     Lack of Transportation (Medical): No     Lack of Transportation (Non-Medical): No   Physical Activity: Not on file   Stress: Not on file   Social Connections: Not on file   Intimate Partner Violence: Not on file   Housing Stability: Low Risk  (12/4/2023)    Housing Stability Vital Sign     Unable to Pay for Housing in the Last Year: No     Number of Places Lived in the Last Year: 1     Unstable Housing in the Last Year: No       Tobacco Use: Low Risk  (1/22/2024)    Patient History     Smoking Tobacco Use: Never     Smokeless Tobacco Use: Never     Passive Exposure: Not on file        Social History     Substance and Sexual Activity   Alcohol Use Never        Allergies     Allergies   Allergen Reactions    Bee Pollen Unknown    House Dust Unknown    House Dust Mite Unknown    Adhesive Tape-Silicones Rash        Objective    Physical Exam  Constitutional:       Appearance: Normal appearance.   HENT:       Head: Normocephalic.      Nose: Nose normal.      Mouth/Throat:      Mouth: Mucous membranes are moist.      Pharynx: Oropharynx is clear.   Eyes:      Conjunctiva/sclera: Conjunctivae normal.   Cardiovascular:      Rate and Rhythm: Normal rate and regular rhythm.      Pulses: Normal pulses.      Heart sounds: Normal heart sounds.   Pulmonary:      Effort: Pulmonary effort is normal.      Breath sounds: Normal breath sounds.      Comments: On 2 L NC  Abdominal:      Palpations: Abdomen is soft.      Tenderness: There is no abdominal tenderness.   Musculoskeletal:      Right lower leg: No edema.      Left lower leg: No edema.   Skin:     General: Skin is warm and dry.   Neurological:      General: No focal deficit present.      Mental Status: She is alert.   Psychiatric:         Mood and Affect: Mood normal.         Behavior: Behavior normal.          Visit Vitals  /59 (BP Location: Left arm, Patient Position: Sitting)   Pulse 73   Temp 36.6 °C (97.9 °F) (Tympanic)   Resp 19   Ht 1.524 m (5')   Wt 83.7 kg (184 lb 8.4 oz)   SpO2 100%   BMI 36.04 kg/m²   Smoking Status Never   BSA 1.88 m²        No intake or output data in the 24 hours ending 01/22/24 1830          Meds    Scheduled medications    Continuous medications    PRN medications       Labs:   Results from last 72 hours   Lab Units 01/22/24  1317   SODIUM mmol/L 132*   POTASSIUM mmol/L 3.6   CHLORIDE mmol/L 94*   CO2 mmol/L 29   BUN mg/dL 33*   CREATININE mg/dL 4.61*   GLUCOSE mg/dL 118*   CALCIUM mg/dL 9.4   ANION GAP mmol/L 13   EGFR mL/min/1.73m*2 9*      Results from last 72 hours   Lab Units 01/22/24  1317   WBC AUTO x10*3/uL 9.7   HEMOGLOBIN g/dL 11.5*   HEMATOCRIT % 34.6*   PLATELETS AUTO x10*3/uL 397   NEUTROS PCT AUTO % 73.0   LYMPHS PCT AUTO % 11.2   MONOS PCT AUTO % 10.9   EOS PCT AUTO % 3.7      Lab Results   Component Value Date    CALCIUM 9.4 01/22/2024    PHOS 5.2 (H) 12/05/2023      Lab Results   Component Value Date    CRP 1.67 (A)  "10/12/2021       [unfilled]     Micro/ID:   No results found for the last 90 days.                   No lab exists for component: \"AGALPCRNB\"   .ID  No results found for: \"URINECULTURE\", \"BLOODCULT\", \"CSFCULTSMEAR\"    Images    ECG 12 lead    Result Date: 1/22/2024  Normal sinus rhythm Minimal voltage criteria for LVH, may be normal variant ( R in aVL ) Borderline ECG When compared with ECG of 04-DEC-2023 10:35, No significant change was found    XR chest 2 views    Result Date: 1/22/2024  STUDY: Chest Radiographs;  01/22/2024 1:55 PM INDICATION: Chest pain, shortness of breath.  Missed dialysis. COMPARISON: XR chest 12/03/2023, 06/04/2023.  ACCESSION NUMBER(S): [id number] ORDERING CLINICIAN: Jacky Chow TECHNIQUE:  Frontal and lateral chest. FINDINGS: The heart is minimally enlarged. No infiltrates or vascular congestion are present. There are no pleural effusions. There is no pneumothorax. Slight deviation of the upper trachea to the left suggests minimal goiter. Previous surgeries are seen in the right axilla and right shoulder. Bony thorax is intact.    Minimal cardiomegaly. No infiltrates or vascular congestion. Remainder as above. Signed by Arturo Sy MD     Encounter Date: 01/22/24   ECG 12 lead   Result Value    Ventricular Rate 69    Atrial Rate 69    OH Interval 158    QRS Duration 92    QT Interval 444    QTC Calculation(Bazett) 475    P Axis 55    R Axis 0    T Axis 61    QRS Count 12    Q Onset 219    P Onset 140    P Offset 191    T Offset 441    QTC Fredericia 465    Narrative    Normal sinus rhythm  Minimal voltage criteria for LVH, may be normal variant ( R in aVL )  Borderline ECG  When compared with ECG of 04-DEC-2023 10:35,  No significant change was found      Encounter Date: 01/22/24   ECG 12 lead   Result Value    Ventricular Rate 69    Atrial Rate 69    OH Interval 158    QRS Duration 92    QT Interval 444    QTC Calculation(Bazett) 475    P Axis 55    R Axis 0    T Axis 61    QRS " Count 12    Q Onset 219    P Onset 140    P Offset 191    T Offset 441    QTC Fredericia 465    Narrative    Normal sinus rhythm  Minimal voltage criteria for LVH, may be normal variant ( R in aVL )  Borderline ECG  When compared with ECG of 04-DEC-2023 10:35,  No significant change was found      @CT@   [unfilled]   [unfilled]   === 02/23/19 ===    CT CERVICAL SPINE WO CONTRAST    - Impression -  1. No acute cervical osseous abnormality.  2. Multilevel degenerative changes with associated neural foraminal  narrowing as described above. No significant change     Assessment and Plan    Joyce Bethea is a 78 y.o. female with past medical history of HFpEF and ESRD on HD ([facility]), presenting for chest pain occurring during dialysis treatment. Admitted to observation.    Acute medical issues  #stable angina  #elevated troponins  - likely 2/2 demand ischemia 2/2 hypoperfusion during hemodialysis  - last echo in June 2023 (EF 55-60%), sees Dr. Ruiz  - Heart score of 5  - trop 24, trending tonight  -  (went as high as 1,761 last December)  - EKG non ischemic  - admit to obs, telemetry  - cardiology consulted, appreciate recs    Chronic medical issues  #HTN - cont home carvedilol 25 mg BID, losartan 100 mg daily, amlodipine 5 mg daily  #GERD - protonix  #ESRD on HD ([facility]) - nephrology consulted with possibility of requiring dialysis while inpatient  #HEATH on CPAP - hold CPAP for tonight as patient cannot use regular mask  #Chronic hypoxic respiratory failure - cont home 2L O2  #Essential thrombocytosis - cont hydroxyurea 500 mg on F  #HFpEF (EF 55-60%)  #Hyperphosphatemia - cont tums x4 with meals    Fluids: 1500 mL fluid restriction  Electrolytes: replete prn  Nutrition: cardiac/renal diet  GI Ppx: protonix 40mg PO  DVT Ppx: subcutaneous heparin    Oxygenation: 2 L NC  Antibiotics: Not indicated    Dispo: 77yo F w/ pmhx of HFpEF and ESRD on HD ([facility]) admitted to observation for chest pain with dialysis. Likely stable  angina 2/2 demand ischemia exacerbated by hypoperfusion from hypotension during hemodialysis. Estimated LOS < 48 hrs.    Manuel Duran,   Internal Medicine PGY1

## 2024-01-22 NOTE — PROGRESS NOTES
Pharmacy Medication History Review    Joyce Bethea is a 78 y.o. female admitted for Chest pain. Pharmacy reviewed the patient's [id number] medications and allergies for accuracy.    The list below reflectives the updated PTA list. Please review each medication in order reconciliation for additional clarification and justification.  (Not in a hospital admission)       The list below reflectives the updated allergy list. Please review each documented allergy for additional clarification and justification.  Allergies  Reviewed by Yudelka Wills RN on 1/22/2024        Severity Reactions Comments    Bee Pollen Not Specified Unknown     House Dust Not Specified Unknown     House Dust Mite Not Specified Unknown     Adhesive Tape-silicones Low Rash             Below are additional concerns with the patient's PTA list.      Jaja Mueller CPhT

## 2024-01-23 ENCOUNTER — APPOINTMENT (OUTPATIENT)
Dept: PULMONOLOGY | Facility: CLINIC | Age: 79
End: 2024-01-23
Payer: MEDICARE

## 2024-01-23 VITALS
RESPIRATION RATE: 16 BRPM | SYSTOLIC BLOOD PRESSURE: 159 MMHG | HEIGHT: 60 IN | DIASTOLIC BLOOD PRESSURE: 65 MMHG | BODY MASS INDEX: 36.23 KG/M2 | TEMPERATURE: 97.9 F | HEART RATE: 80 BPM | WEIGHT: 184.53 LBS | OXYGEN SATURATION: 96 %

## 2024-01-23 LAB
ALBUMIN SERPL BCP-MCNC: 3.3 G/DL (ref 3.4–5)
ANION GAP SERPL CALC-SCNC: 16 MMOL/L (ref 10–20)
BUN SERPL-MCNC: 45 MG/DL (ref 6–23)
CALCIUM SERPL-MCNC: 8.6 MG/DL (ref 8.6–10.3)
CARDIAC TROPONIN I PNL SERPL HS: 25 NG/L (ref 0–13)
CHLORIDE SERPL-SCNC: 97 MMOL/L (ref 98–107)
CO2 SERPL-SCNC: 26 MMOL/L (ref 21–32)
CREAT SERPL-MCNC: 6.45 MG/DL (ref 0.5–1.05)
EGFRCR SERPLBLD CKD-EPI 2021: 6 ML/MIN/1.73M*2
ERYTHROCYTE [DISTWIDTH] IN BLOOD BY AUTOMATED COUNT: 14.6 % (ref 11.5–14.5)
GLUCOSE BLD MANUAL STRIP-MCNC: 80 MG/DL (ref 74–99)
GLUCOSE SERPL-MCNC: 83 MG/DL (ref 74–99)
HCT VFR BLD AUTO: 32.8 % (ref 36–46)
HGB BLD-MCNC: 10.9 G/DL (ref 12–16)
MAGNESIUM SERPL-MCNC: 1.95 MG/DL (ref 1.6–2.4)
MCH RBC QN AUTO: 33.9 PG (ref 26–34)
MCHC RBC AUTO-ENTMCNC: 33.2 G/DL (ref 32–36)
MCV RBC AUTO: 102 FL (ref 80–100)
NRBC BLD-RTO: 0 /100 WBCS (ref 0–0)
PHOSPHATE SERPL-MCNC: 4.6 MG/DL (ref 2.5–4.9)
PLATELET # BLD AUTO: 393 X10*3/UL (ref 150–450)
POTASSIUM SERPL-SCNC: 4 MMOL/L (ref 3.5–5.3)
RBC # BLD AUTO: 3.22 X10*6/UL (ref 4–5.2)
SODIUM SERPL-SCNC: 135 MMOL/L (ref 136–145)
WBC # BLD AUTO: 8.3 X10*3/UL (ref 4.4–11.3)

## 2024-01-23 PROCEDURE — 36415 COLL VENOUS BLD VENIPUNCTURE: CPT | Performed by: INTERNAL MEDICINE

## 2024-01-23 PROCEDURE — 83735 ASSAY OF MAGNESIUM: CPT | Performed by: INTERNAL MEDICINE

## 2024-01-23 PROCEDURE — 2500000001 HC RX 250 WO HCPCS SELF ADMINISTERED DRUGS (ALT 637 FOR MEDICARE OP): Performed by: INTERNAL MEDICINE

## 2024-01-23 PROCEDURE — 99222 1ST HOSP IP/OBS MODERATE 55: CPT | Performed by: INTERNAL MEDICINE

## 2024-01-23 PROCEDURE — 82947 ASSAY GLUCOSE BLOOD QUANT: CPT

## 2024-01-23 PROCEDURE — G0378 HOSPITAL OBSERVATION PER HR: HCPCS

## 2024-01-23 PROCEDURE — 85027 COMPLETE CBC AUTOMATED: CPT | Performed by: INTERNAL MEDICINE

## 2024-01-23 PROCEDURE — 99236 HOSP IP/OBS SAME DATE HI 85: CPT | Performed by: INTERNAL MEDICINE

## 2024-01-23 PROCEDURE — 2500000004 HC RX 250 GENERAL PHARMACY W/ HCPCS (ALT 636 FOR OP/ED): Mod: MUE | Performed by: INTERNAL MEDICINE

## 2024-01-23 PROCEDURE — 84484 ASSAY OF TROPONIN QUANT: CPT | Performed by: INTERNAL MEDICINE

## 2024-01-23 PROCEDURE — 80069 RENAL FUNCTION PANEL: CPT | Performed by: INTERNAL MEDICINE

## 2024-01-23 RX ORDER — AMLODIPINE BESYLATE 5 MG/1
10 TABLET ORAL DAILY
Qty: 90 TABLET | Refills: 1 | Status: SHIPPED | OUTPATIENT
Start: 2024-01-23 | End: 2024-05-14

## 2024-01-23 RX ADMIN — CALCIUM CARBONATE (ANTACID) CHEW TAB 500 MG 500 MG: 500 CHEW TAB at 08:14

## 2024-01-23 RX ADMIN — LOSARTAN POTASSIUM 100 MG: 50 TABLET, FILM COATED ORAL at 08:14

## 2024-01-23 RX ADMIN — Medication 1 CAPSULE: at 08:14

## 2024-01-23 RX ADMIN — AMLODIPINE BESYLATE 5 MG: 5 TABLET ORAL at 08:14

## 2024-01-23 RX ADMIN — HEPARIN SODIUM 5000 UNITS: 5000 INJECTION INTRAVENOUS; SUBCUTANEOUS at 10:13

## 2024-01-23 RX ADMIN — PANTOPRAZOLE SODIUM 40 MG: 40 TABLET, DELAYED RELEASE ORAL at 08:14

## 2024-01-23 RX ADMIN — HEPARIN SODIUM 5000 UNITS: 5000 INJECTION INTRAVENOUS; SUBCUTANEOUS at 01:54

## 2024-01-23 RX ADMIN — LORATADINE 10 MG: 10 TABLET ORAL at 08:14

## 2024-01-23 RX ADMIN — CARVEDILOL 25 MG: 25 TABLET, FILM COATED ORAL at 08:14

## 2024-01-23 ASSESSMENT — COGNITIVE AND FUNCTIONAL STATUS - GENERAL
MOBILITY SCORE: 19
TOILETING: A LITTLE
WALKING IN HOSPITAL ROOM: A LITTLE
CLIMB 3 TO 5 STEPS WITH RAILING: A LOT
DAILY ACTIVITIY SCORE: 20
STANDING UP FROM CHAIR USING ARMS: A LITTLE
HELP NEEDED FOR BATHING: A LITTLE
DRESSING REGULAR UPPER BODY CLOTHING: A LITTLE
DRESSING REGULAR LOWER BODY CLOTHING: A LITTLE
MOVING TO AND FROM BED TO CHAIR: A LITTLE

## 2024-01-23 ASSESSMENT — ACTIVITIES OF DAILY LIVING (ADL): LACK_OF_TRANSPORTATION: NO

## 2024-01-23 ASSESSMENT — PAIN SCALES - GENERAL
PAINLEVEL_OUTOF10: 0 - NO PAIN
PAINLEVEL_OUTOF10: 0 - NO PAIN

## 2024-01-23 NOTE — CARE PLAN
1/23/24:  Per chart review, pt receives HD MWF at [facility].  SW will call and follow up to confirm chair time.

## 2024-01-23 NOTE — CARE PLAN
The patient's goals for the shift include      The clinical goals for the shift include feel better    Pt progressing towards goals. Compliant with care plan at this time. Will monitor

## 2024-01-23 NOTE — DISCHARGE SUMMARY
Discharge Diagnosis  Atypical Chest pain  ESRD on HD  Recurrent syncope  HFpEF chronic    Issues Requiring Follow-Up  Cardiology follow-up for [facility] (Dr. Ruiz)  Nephrology for ESRD (Dr. Camara) - Consider adjustments to dialysis for patient if causing issues such as chest pain and syncope once cardiology clears at followup    Discharge Meds     Your medication list        ASK your doctor about these medications        Instructions Last Dose Given Next Dose Due   acetaminophen 325 mg tablet  Commonly known as: Tylenol           amitriptyline 10 mg tablet  Commonly known as: Elavil           amLODIPine 5 mg tablet  Commonly known as: Norvasc      Take 1 tablet (5 mg) by mouth once daily. Do not start before December 6, 2023.       B complex-vitamin C-folic acid 1 mg capsule  Commonly known as: Nephrocaps  Ask about: Which instructions should I use?           calcitriol 0.25 mcg capsule  Commonly known as: Rocaltrol           carvedilol 25 mg tablet  Commonly known as: Coreg           cholecalciferol 50 mcg (2,000 unit) capsule  Commonly known as: Vitamin D-3           fluticasone 50 mcg/actuation nasal spray  Commonly known as: Flonase      Administer 2 sprays into each nostril once daily. Shake gently. Before first use, prime pump. After use, clean tip and replace cap. Do not start before December 6, 2023.       gabapentin 100 mg capsule  Commonly known as: Neurontin           hydroxyurea 500 mg capsule  Commonly known as: Hydrea           ipratropium-albuteroL 0.5-2.5 mg/3 mL nebulizer solution  Commonly known as: Duo-Neb      Take 3 mL by nebulization every 2 hours if needed for wheezing or shortness of breath.       loratadine 10 mg tablet  Commonly known as: Claritin           losartan 25 mg tablet  Commonly known as: Cozaar           meclizine 25 mg tablet  Commonly known as: Antivert           Mucinex 600 mg 12 hr tablet  Generic drug: guaiFENesin           omeprazole 20 mg DR capsule  Commonly known as:  "PriLOSEC           oxygen gas therapy  Commonly known as: O2           PROBIOTIC FORMULA (INULIN) ORAL           Procrit 2,000 unit/mL injection  Generic drug: epoetin palomo  Ask about: Which instructions should I use?           Senna-S 8.6-50 mg tablet  Generic drug: sennosides-docusate sodium           sodium chloride 0.65 % nasal spray  Commonly known as: Ocean  Ask about: Which instructions should I use?                    Test Results Pending At Discharge  Pending Labs       No current pending labs.            Hospital Course  Joyce Bethea is a 78 y.o. female HTN, HLD, breast cancer s/p b/l mastectomy (1991 and 2009), ESRD on HD (MWF), HEATH on CPAP (prefers home mask due to having broken the bridge of her nose), chronic hypoxic respiratory failure (2 L baseline and now requiring 3 L on exertion), essential thrombocytosis, HFpEF (EF 55-60%), chronic anemia presented to the hospital for chest pain that started at approximately noon during her hemodialysis. During patient's dialysis session, she started feeling clammy and experiencing blurred vision. She started to feel like she was going to pass out, believes she did actually pass out but unsure for how long (no reports of this or hypotension). She began experiencing chest pain \"across [her] breastbone\" that did not radiate. She describes this pain as feeling as if she was kicked in the chest by a horse. The dialysis team stopped her dialysis 2 hours into her 4 hour session due to these symptoms and gave her sublingual nitroglycerin. After this, her chest pain gradually dissipated. She admits to shortness of breath at baseline besides with exertional she has been turning her o2 up. Denies chest pain, heart palpations, dizziness or orthopnea.  States she is oliguric urinates a few oz in the morning and afternoon.     Patient was admitted to general medicine for atypical chest pain likely related to her dialysis including wide reported variations on blood " pressure impacting perfusion of her heart and body overall.  Cardiology was consulted (follows Dr. Ruiz).  Telemetry was reviewed and did not see any irregularities other than she appeared to be moving around causing motion artifact.  Cardiology recommended increasing amlodipine from 5 mg to 10 mg and will arrange outpatient follow-up for heart cath in a month.  Nephrology was consulted who recommended no emergent need for dialysis and to resume normal schedule.  Patient was deemed medically cleared for discharge given no chest pain during inpatient hospitalization and tropes down trended with no EKG changes.    Patient discharged back to her facility today.     Pertinent Physical Exam At Time of Discharge  Physical Exam  Constitutional:       Appearance: Normal appearance.   HENT:      Head: Normocephalic.      Nose: Nose normal.      Mouth/Throat:      Mouth: Mucous membranes are moist.      Pharynx: Oropharynx is clear.   Eyes:      Conjunctiva/sclera: Conjunctivae normal.   Cardiovascular:      Rate and Rhythm: Normal rate and regular rhythm.      Pulses: Normal pulses.      Heart sounds: Normal heart sounds.   Pulmonary:      Effort: Pulmonary effort is normal.      Breath sounds: Normal breath sounds.      Comments: On 2 L NC  Abdominal:      Palpations: Abdomen is soft.      Tenderness: There is no abdominal tenderness.   Musculoskeletal:      Right lower leg: No edema.      Left lower leg: No edema.   Skin:     General: Skin is warm and dry.   Neurological:      General: No focal deficit present.      Mental Status: She is alert.   Psychiatric:         Mood and Affect: Mood normal.         Behavior: Behavior normal.       Outpatient Follow-Up  Future Appointments   Date Time Provider Department Center   1/30/2024 11:00 AM Mercedes Manning MD [id number] [facility]   6/13/2024 10:30 AM Azul Zuñiga PA-C [id number] [facility]   10/29/2024  2:20 PM Mercedes Manning MD [id number] [facility]           Jonathan Rolle MD  Internal  Medicine, PGY-3

## 2024-01-23 NOTE — CARE PLAN
The patient's goals for the shift include  control pain    The clinical goals for the shift include feel better    Pt had uneventful day.  Medicated appropriately.  Updated to plan of care

## 2024-01-23 NOTE — CONSULTS
Consults  History Of Present Illness:    Joyce Bethea is a 78 y.o. female HTN, HLD, breast cancer s/p b/l mastectomy (1991 and 2009), ESRD on HD (MWF), HEATH on CPAP (prefers home mask due to having broken the bridge of her nose), chronic hypoxic respiratory failure (2 L baseline and now requiring 3 L on exertion), essential thrombocytosis, HFpEF (EF 55-60%), chronic anemia.    Presented to the hospital for chest pain that started during hemodialysis. During patient's dialysis session, she started feeling clammy and experiencing blurred vision. She started to feel like she was going to pass out, believes she did actually pass out but unsure for how long (no reports of this or hypotension). She began experiencing chest pain across her breastbone that did not radiate. She describes this pain as feeling as if she was kicked in the chest by a horse. The dialysis team stopped her dialysis 2 hours into her 4 hour session due to these symptoms and gave her sublingual nitroglycerin. After this, her chest pain gradually dissipated. She admits to shortness of breath at baseline besides noting some worsening exertional SOB, she has been turning her o2 up. Denies chest pain, heart palpations, dizziness or orthopnea. States she is oliguric urinates a few cc in the morning and afternoon.     In the ED her symptoms had already resolved, she was HTN.      Last Recorded Vitals:  Vitals:    01/22/24 2008 01/23/24 0300 01/23/24 0700 01/23/24 0806   BP:  160/68 174/80 174/80   BP Location:       Patient Position:       Pulse:  70 89 89   Resp:  18     Temp:  36.5 °C (97.7 °F)     TempSrc:       SpO2: 98% 98%     Weight:       Height:           Last Labs:  CBC - 1/23/2024:  6:36 AM  8.3 10.9 393    32.8      CMP - 1/23/2024:  6:36 AM  8.6 6.4 21 --- 0.4   4.6 3.3 20 184      PTT - No results in last year.  _   _ _     Troponin I, High Sensitivity   Date/Time Value Ref Range Status   01/22/2024 09:19 PM 23 (H) 0 - 13 ng/L Final    01/22/2024 07:05 PM 19 (H) 0 - 13 ng/L Final   01/22/2024 02:45 PM 24 (H) 0 - 13 ng/L Final     BNP   Date/Time Value Ref Range Status   01/22/2024 01:17  (H) 0 - 99 pg/mL Final   12/03/2023 11:30 PM 1,761 (H) 0 - 99 pg/mL Final     VLDL   Date/Time Value Ref Range Status   10/03/2021 06:31 AM 18 0 - 40 mg/dL Final   06/25/2019 08:00 AM 21 0 - 40 mg/dL Final   08/08/2018 08:00 AM 29 0 - 40 mg/dL Final      Last I/O:  I/O last 3 completed shifts:  In: 120 (1.4 mL/kg) [P.O.:120]  Out: - (0 mL/kg)   Weight: 83.7 kg     Past Cardiology Tests (Last 3 Years):  EKG:  Normal sinus rhythm 69bpm      Echo: 6/29/23  1. Left ventricular systolic function is normal with a 55-60% estimated ejection fraction.  2. Spectral Doppler shows an impaired relaxation pattern of left ventricular diastolic filling.  3. The left atrium is moderately dilated.  4. Mild to moderate mitral valve regurgitation.  5. Mild aortic valve regurgitation.  6. There is mild tricuspid regurgitation.  7. The estimated pulmonary artery pressure is mild-moderately elevated with the RVSP at 49.5 mmHg.    [facility] 6/29/2023-7/13/2023:  Predominantly NSR   Min HR 57bpm  Max HR 160bpm  Avg HR 76  17 events of SVT- longest 6 min 22sec-- reviewed for sinus tachycardia    Cath:  No results found for this or any previous visit from the past 1095 days.    Stress Test:  No results found for this or any previous visit from the past 1095 days.    Cardiac Imaging:  No results found for this or any previous visit from the past 1095 days.      Past Medical History:  She has a past medical history of Body mass index (BMI) 31.0-31.9, adult (04/12/2019), Encounter for follow-up examination after completed treatment for conditions other than malignant neoplasm (11/30/2017), Other conditions influencing health status (03/12/2019), Other specified disorders of bone, unspecified site (03/11/2015), Personal history of malignant neoplasm of breast (02/05/2014), Personal history of  malignant neoplasm, unspecified, Personal history of other diseases of the circulatory system, Personal history of other diseases of the digestive system, Personal history of other diseases of the musculoskeletal system and connective tissue (01/21/2016), Personal history of other diseases of the musculoskeletal system and connective tissue, Personal history of other diseases of the nervous system and sense organs, Personal history of other diseases of urinary system, Respiratory failure, unspecified with hypoxia (CMS/HCC) (12/29/2017), Shortness of breath (10/06/2017), Unspecified cataract, and Unspecified fracture of unspecified forearm, sequela (10/04/2018).    Past Surgical History:  She has a past surgical history that includes Colonoscopy (02/05/2014); Tonsillectomy (02/05/2014); Mastectomy (02/05/2014); Rotator cuff repair (04/01/2014); Knee arthroscopy w/ meniscal repair (04/01/2014); Other surgical history (09/21/2021); MR angio head wo IV contrast (2/23/2019); MR angio neck wo IV contrast (2/23/2019); and IR CVC tunneled (10/12/2021).      Social History:  She reports that she has never smoked. She has never used smokeless tobacco. She reports that she does not drink alcohol and does not use drugs.    Family History:  Family History   Problem Relation Name Age of Onset    Hypertension Mother      Leukemia Mother      Osteoarthritis Mother      Colon cancer Father      Diabetes Father      Hypertension Father      Osteoarthritis Sister      Osteoarthritis Other Aunt     Stroke Other Aunt     Stroke Other Uncle     Breast cancer Other Family History         Allergies:  Bee pollen, House dust, House dust mite, and Adhesive tape-silicones    Inpatient Medications:  Scheduled medications   Medication Dose Route Frequency    amitriptyline  10 mg oral Daily    amLODIPine  5 mg oral Daily    calcitriol  0.25 mcg oral Every Mon/Wed/Fri    calcium carbonate  500 mg oral After meals & nightly    carvedilol  25 mg  oral BID with meals    epoetin palomo  2,000 Units subcutaneous Every Mon/Wed/Fri    fluticasone  2 spray Each Nostril Daily    heparin (porcine)  5,000 Units subcutaneous q8h    hydroxyurea  500 mg oral Every Mon/Wed/Fri    insulin lispro  0-15 Units subcutaneous With meals & nightly    lactobacillus acidophilus  1 capsule oral Daily    losartan  100 mg oral Daily    melatonin  5 mg oral Nightly    pantoprazole  40 mg oral Daily before breakfast    polyethylene glycol  17 g oral Daily    sennosides-docusate sodium  1 tablet oral Nightly     PRN medications   Medication    dextrose 10 % in water (D10W)    dextrose    gabapentin    glucagon    HYDROmorphone    loratadine    meclizine    ondansetron    oxyCODONE    oxyCODONE     Continuous Medications   Medication Dose Last Rate     Outpatient Medications:  Current Outpatient Medications   Medication Instructions    acetaminophen (TYLENOL) 650 mg, oral, Every 8 hours PRN, Max 2000mg daily     amitriptyline (ELAVIL) 10 mg, oral, Daily    amLODIPine (NORVASC) 5 mg, oral, Daily    B complex-vitamin C-folic acid (Nephrocaps) 1 mg capsule 1 capsule, oral, Daily    Bacillus coagulans/inulin (PROBIOTIC FORMULA, INULIN, ORAL) 1 capsule, oral, Daily    calcitriol (ROCALTROL) 0.25 mcg, oral, Every Mon/Wed/Fri    carvedilol (Coreg) 25 mg tablet 1 tablet, oral, 2 times daily with meals    cholecalciferol (VITAMIN D-3) 50 mcg, oral, Daily    epoetin palomo (Procrit) 2,000 unit/mL injection 1 mL, subcutaneous, Every Mon/Wed/Fri    fluticasone (Flonase) 50 mcg/actuation nasal spray 2 sprays, Each Nostril, Daily, Shake gently. Before first use, prime pump. After use, clean tip and replace cap.    gabapentin (NEURONTIN) 100 mg, oral, Nightly PRN    guaiFENesin (MUCINEX) 600 mg, oral, Every 12 hours PRN    hydroxyurea (HYDREA) 500 mg, oral, Every Mon/Wed/Fri, M, W, F    ipratropium-albuteroL (Duo-Neb) 0.5-2.5 mg/3 mL nebulizer solution 3 mL, nebulization, Every 2 hour PRN    loratadine  (CLARITIN) 10 mg, oral, Daily PRN, In the morning    losartan (COZAAR) 100 mg, oral, Daily    meclizine (ANTIVERT) 25 mg, oral, Daily PRN    omeprazole (PRILOSEC) 20 mg, oral, Daily PRN    oxygen (O2) gas therapy Oxygen   Refills: 0       Active    sennosides-docusate sodium (Senna-S) 8.6-50 mg tablet 1 tablet, oral, Nightly    sodium chloride (Ocean) 0.65 % nasal spray 2 sprays, Each Nostril, Every 1 hour PRN       Physical Exam  Vitals reviewed.   HENT:      Head: Normocephalic.      Nose: Nose normal.   Eyes:      Pupils: Pupils are equal, round, and reactive to light.   Cardiovascular:      Rate and Rhythm: Normal rate and regular rhythm. Trace BLE   Pulmonary:      Effort: Pulmonary effort is normal.      Breath sounds: Normal breath sounds.   Abdominal:      General: Abdomen is flat.      Palpations: Abdomen is soft.   Musculoskeletal:         General: Normal range of motion.      Cervical back: Normal range of motion.   Skin:     General: Skin is warm and dry.   Neurological:      General: No focal deficit present.      Mental Status: He is alert and oriented to person, place, and time.   Psychiatric:         Mood and Affect: Mood normal.         Behavior: Behavior normal.       Assessment/Plan   Joyce Bethea is a 78 y.o. female HTN, HLD, breast cancer s/p b/l mastectomy (1991 and 2009), ESRD on HD (MWF), HEATH on CPAP (prefers home mask due to having broken the bridge of her nose), chronic hypoxic respiratory failure (2 L baseline and now requiring 3 L on exertion), essential thrombocytosis, HFpEF (EF 55-60%), chronic anemia.    She is currently asymptomatic and hemodynamically stable. She has had multiple similar episodes during dialysis, likely a demand ischemia event secondary to hypoperfusion. She has multiple diagnoses that make her high risk for CAD, c/o worsening HUSAIN, would recommend following up with cardiology outpatient to discuss further CAD evaluation (CT vs Stress vs L+RHC).      #Demand  Ischemia related to hypoperfusion during dialysis   #HTN  #Elevated Troponin not concerning for ACS    Plan:  Increase Amlodipine from 5mg to 10mg  Follow up with cardiology outpatient to discuss CAD eval (appt on 2/13 with Ursula Welch)       Shanna Echevarria, ANASTASIA-CNP

## 2024-01-23 NOTE — H&P
"Patient: Joyce Bethea  Room/bed: AC17/17  Admitted on: 1/22/2024    Age: 78 y.o. Gender: Female  Code Status:  DNR and No Intubation   Admitting Dx: No admission diagnoses are documented for this encounter.    MRN: [id number]  PCP: FANNIE Galvin  Preferred Pharm: [unfilled]    Attending: Waldemar Hernandez DO Resident: Manuel Duran DO  TCC: No care team member to display      Chief Complaint   Patient: Joyce Bethea is a 78 y.o. female who presented to the hospital for chest pain.    HPI   HPI: Joyce Bethea is a 78 y.o. female w/ pmhx of HFpEF and ESRD with HD (MWF) who presented to the hospital for chest pain that started at approximately noon during her hemodialysis. During patient's dialysis session, she started feeling clammy and experiencing blurred vision. She started to feel herself receding as if she were going to pass out. She began experiencing chest pain \"across [her] breastbone\" that did not radiate and was coupled with both \"a little bit\" of pressure and shortness of breath. She describes this pain as feeling as if she was kicked in the chest by a horse. The dialysis team stopped her dialysis 2 hours into her 4 hour session due to these symptoms and gave her sublingual nitroglycerin. After this, her chest pain gradually dissipated. Of note, patient states she has experienced this chest pain before, at least 4-5 times before, about once per year. Also reports she sometimes passes out at dialysis over past year as well. Reports Bps at dialysis vary from being very high above 200 systolic to very low randomly. Hoa she says is her PCP it seems.    12 point Review of systems reviewed and are negative unless stated above    PMHx - HTN, HLD, breast cancer s/p b/l mastectomy (1991 and 2009), ESRD on HD (MWF), HEATH on CPAP (prefers home mask due to having broken the bridge of her nose), chronic hypoxic respiratory failure (2 L baseline and now requiring 3 L on exertion), " "essential thrombocytosis, HFpEF (EF 55-60%), chronic anemia  SuHx - double mastectomy, lipoma removed from ankle, Rt rotator cuff repair  Social - Tobacco: denies / EtOH - 2 oz of wine on holidays / Illicit - denies    ED Course   Vitals - /59 (BP Location: Left arm, Patient Position: Sitting)   Pulse 73   Temp 36.6 °C (97.9 °F) (Tympanic)   Resp 19   Wt 83.7 kg (184 lb 8.4 oz)   SpO2 100%   Labs -   Lab Results   Component Value Date    WBC 9.7 01/22/2024    HGB 11.5 (L) 01/22/2024    HCT 34.6 (L) 01/22/2024     (H) 01/22/2024     01/22/2024     Lab Results   Component Value Date    GLUCOSE 118 (H) 01/22/2024    CALCIUM 9.4 01/22/2024     (L) 01/22/2024    K 3.6 01/22/2024    CO2 29 01/22/2024    CL 94 (L) 01/22/2024    BUN 33 (H) 01/22/2024    CREATININE 4.61 (H) 01/22/2024     Lab Results   Component Value Date    TROPHS 24 (H) 01/22/2024     Lab Results   Component Value Date     (H) 01/22/2024   No results found for: \"DDIMERVTE\"  Imaging -   XR chest 2 views   Final Result   Minimal cardiomegaly. No infiltrates or vascular congestion. Remainder   as above.   Signed by Arturo Sy MD         Interventions -   Medications   heparin (porcine) injection 5,000 Units (has no administration in time range)   pantoprazole (ProtoNix) EC tablet 40 mg (has no administration in time range)   ondansetron (Zofran) injection 4 mg (has no administration in time range)   polyethylene glycol (Glycolax, Miralax) packet 17 g (has no administration in time range)       Past Medical History     Past Medical History:   Diagnosis Date    Body mass index (BMI) 31.0-31.9, adult 04/12/2019    BMI 31.0-31.9,adult    Encounter for follow-up examination after completed treatment for conditions other than malignant neoplasm 11/30/2017    Hospital discharge follow-up    Other conditions influencing health status 03/12/2019    History of cough    Other specified disorders of bone, unspecified site " 03/11/2015    Abnormal bone formation    Personal history of malignant neoplasm of breast 02/05/2014    Personal history of breast cancer    Personal history of malignant neoplasm, unspecified     History of malignant neoplasm    Personal history of other diseases of the circulatory system     History of hypertension    Personal history of other diseases of the digestive system     History of gastroesophageal reflux (GERD)    Personal history of other diseases of the musculoskeletal system and connective tissue 01/21/2016    History of trigger finger    Personal history of other diseases of the musculoskeletal system and connective tissue     History of arthritis    Personal history of other diseases of the nervous system and sense organs     History of sleep apnea    Personal history of other diseases of urinary system     History of kidney disease    Respiratory failure, unspecified with hypoxia (CMS/HCC) 12/29/2017    Respiratory failure with hypoxia    Shortness of breath 10/06/2017    Exertional shortness of breath    Unspecified cataract     Cataracts, bilateral    Unspecified fracture of unspecified forearm, sequela 10/04/2018    Forearm fracture, sequela        Surgical History     Past Surgical History:   Procedure Laterality Date    COLONOSCOPY  02/05/2014    Colonoscopy (Fiberoptic)    IR CVC TUNNELED  10/12/2021    IR CVC TUNNELED 10/12/2021 [facility]    KNEE ARTHROSCOPY W/ MENISCAL REPAIR  04/01/2014    Knee Arthroscopy With Medial Meniscus Repair    MASTECTOMY  02/05/2014    Breast Surgery Mastectomy    MR HEAD ANGIO WO IV CONTRAST  2/23/2019    MR HEAD ANGIO WO IV CONTRAST 2/23/2019 [facility]    MR NECK ANGIO WO IV CONTRAST  2/23/2019    MR NECK ANGIO WO IV CONTRAST 2/23/2019 [facility]    OTHER SURGICAL HISTORY  09/21/2021    Arteriovenous fistula creation procedure    ROTATOR CUFF REPAIR  04/01/2014    Rotator Cuff Repair    TONSILLECTOMY  02/05/2014    Tonsillectomy        Family History     Family History   Problem Relation Name Age of Onset    Hypertension Mother      Leukemia Mother      Osteoarthritis Mother      Colon cancer Father      Diabetes Father      Hypertension Father      Osteoarthritis Sister      Osteoarthritis Other Aunt     Stroke Other Aunt     Stroke Other Uncle     Breast cancer Other Family History        Social History     Social History     Socioeconomic History    Marital status: Single     Spouse name: Not on file    Number of children: Not on file    Years of education: Not on file    Highest education level: Not on file   Occupational History    Not on file   Tobacco Use    Smoking status: Never    Smokeless tobacco: Never   Vaping Use    Vaping Use: Never used   Substance and Sexual Activity    Alcohol use: Never    Drug use: Never    Sexual activity: Not on file   Other Topics Concern    Not on file   Social History Narrative    Not on file     Social Determinants of Health     Financial Resource Strain: Low Risk  (12/4/2023)    Overall Financial Resource Strain (CARDIA)     Difficulty of Paying Living Expenses: Not hard at all   Food Insecurity: Not on file   Transportation Needs: No Transportation Needs (12/4/2023)    PRAPARE - Transportation     Lack of Transportation (Medical): No     Lack of Transportation (Non-Medical): No   Physical Activity: Not on file   Stress: Not on file   Social Connections: Not on file   Intimate Partner Violence: Not on file   Housing Stability: Low Risk  (12/4/2023)    Housing Stability Vital Sign     Unable to Pay for Housing in the Last Year: No     Number of Places Lived in the Last Year: 1     Unstable Housing in the Last Year: No       Tobacco Use: Low Risk  (1/22/2024)    Patient History     Smoking Tobacco Use: Never     Smokeless Tobacco Use: Never     Passive Exposure: Not on file        Social History     Substance and Sexual Activity   Alcohol Use Never        Allergies     Allergies   Allergen Reactions     Bee Pollen Unknown    House Dust Unknown    House Dust Mite Unknown    Adhesive Tape-Silicones Rash        Objective    Physical Exam  Constitutional:       Appearance: Normal appearance.   HENT:      Head: Normocephalic.      Nose: Nose normal.      Mouth/Throat:      Mouth: Mucous membranes are moist.      Pharynx: Oropharynx is clear.   Eyes:      Conjunctiva/sclera: Conjunctivae normal.   Cardiovascular:      Rate and Rhythm: Normal rate and regular rhythm.      Pulses: Normal pulses.      Heart sounds: Normal heart sounds.   Pulmonary:      Effort: Pulmonary effort is normal.      Breath sounds: Normal breath sounds.      Comments: On 2 L NC  Abdominal:      Palpations: Abdomen is soft.      Tenderness: There is no abdominal tenderness.   Musculoskeletal:      Right lower leg: No edema.      Left lower leg: No edema.   Skin:     General: Skin is warm and dry.   Neurological:      General: No focal deficit present.      Mental Status: She is alert.   Psychiatric:         Mood and Affect: Mood normal.         Behavior: Behavior normal.          Visit Vitals  /59 (BP Location: Left arm, Patient Position: Sitting)   Pulse 73   Temp 36.6 °C (97.9 °F) (Tympanic)   Resp 19   Ht 1.524 m (5')   Wt 83.7 kg (184 lb 8.4 oz)   SpO2 100%   BMI 36.04 kg/m²   Smoking Status Never   BSA 1.88 m²        No intake or output data in the 24 hours ending 01/22/24 1830          Meds    Scheduled medications    Continuous medications    PRN medications       Labs:   Results from last 72 hours   Lab Units 01/22/24  1317   SODIUM mmol/L 132*   POTASSIUM mmol/L 3.6   CHLORIDE mmol/L 94*   CO2 mmol/L 29   BUN mg/dL 33*   CREATININE mg/dL 4.61*   GLUCOSE mg/dL 118*   CALCIUM mg/dL 9.4   ANION GAP mmol/L 13   EGFR mL/min/1.73m*2 9*      Results from last 72 hours   Lab Units 01/22/24  1317   WBC AUTO x10*3/uL 9.7   HEMOGLOBIN g/dL 11.5*   HEMATOCRIT % 34.6*   PLATELETS AUTO x10*3/uL 397   NEUTROS PCT AUTO % 73.0   LYMPHS PCT AUTO %  "11.2   MONOS PCT AUTO % 10.9   EOS PCT AUTO % 3.7      Lab Results   Component Value Date    CALCIUM 9.4 01/22/2024    PHOS 5.2 (H) 12/05/2023      Lab Results   Component Value Date    CRP 1.67 (A) 10/12/2021       [unfilled]     Micro/ID:   No results found for the last 90 days.                   No lab exists for component: \"AGALPCRNB\"   .ID  No results found for: \"URINECULTURE\", \"BLOODCULT\", \"CSFCULTSMEAR\"    Images    ECG 12 lead    Result Date: 1/22/2024  Normal sinus rhythm Minimal voltage criteria for LVH, may be normal variant ( R in aVL ) Borderline ECG When compared with ECG of 04-DEC-2023 10:35, No significant change was found    XR chest 2 views    Result Date: 1/22/2024  STUDY: Chest Radiographs;  01/22/2024 1:55 PM INDICATION: Chest pain, shortness of breath.  Missed dialysis. COMPARISON: XR chest 12/03/2023, 06/04/2023.  ACCESSION NUMBER(S): [id number] ORDERING CLINICIAN: Jacky Chow TECHNIQUE:  Frontal and lateral chest. FINDINGS: The heart is minimally enlarged. No infiltrates or vascular congestion are present. There are no pleural effusions. There is no pneumothorax. Slight deviation of the upper trachea to the left suggests minimal goiter. Previous surgeries are seen in the right axilla and right shoulder. Bony thorax is intact.    Minimal cardiomegaly. No infiltrates or vascular congestion. Remainder as above. Signed by Arturo Sy MD     Encounter Date: 01/22/24   ECG 12 lead   Result Value    Ventricular Rate 69    Atrial Rate 69    CA Interval 158    QRS Duration 92    QT Interval 444    QTC Calculation(Bazett) 475    P Axis 55    R Axis 0    T Axis 61    QRS Count 12    Q Onset 219    P Onset 140    P Offset 191    T Offset 441    QTC Fredericia 465    Narrative    Normal sinus rhythm  Minimal voltage criteria for LVH, may be normal variant ( R in aVL )  Borderline ECG  When compared with ECG of 04-DEC-2023 10:35,  No significant change was found      Encounter Date: 01/22/24 "   ECG 12 lead   Result Value    Ventricular Rate 69    Atrial Rate 69    DE Interval 158    QRS Duration 92    QT Interval 444    QTC Calculation(Bazett) 475    P Axis 55    R Axis 0    T Axis 61    QRS Count 12    Q Onset 219    P Onset 140    P Offset 191    T Offset 441    QTC Fredericia 465    Narrative    Normal sinus rhythm  Minimal voltage criteria for LVH, may be normal variant ( R in aVL )  Borderline ECG  When compared with ECG of 04-DEC-2023 10:35,  No significant change was found      @CT@   [unfilled]   [unfilled]   === 02/23/19 ===    CT CERVICAL SPINE WO CONTRAST    - Impression -  1. No acute cervical osseous abnormality.  2. Multilevel degenerative changes with associated neural foraminal  narrowing as described above. No significant change     Assessment and Plan    Joyce Bethea is a 78 y.o. female with past medical history of HFpEF and ESRD on HD ([facility]), presenting for chest pain occurring during dialysis treatment. Admitted to observation.    Acute medical issues  #stable angina  #elevated troponins  - likely 2/2 demand ischemia 2/2 hypoperfusion during hemodialysis  - last echo in June 2023 (EF 55-60%), sees Dr. Ruiz  - Heart score of 5  - trop 24, trending tonight  -  (went as high as 1,761 last December)  - EKG non ischemic  - admit to obs, telemetry  - cardiology consulted, appreciate recs    Chronic medical issues  #HTN - cont home carvedilol 25 mg BID, losartan 100 mg daily, amlodipine 5 mg daily  #GERD - protonix  #ESRD on HD ([facility]) - nephrology consulted with possibility of requiring dialysis while inpatient  #HEATH on CPAP - hold CPAP for tonight as patient cannot use regular mask  #Chronic hypoxic respiratory failure - cont home 2L O2  #Essential thrombocytosis - cont hydroxyurea 500 mg on MWF  #HFpEF (EF 55-60%)  #Hyperphosphatemia - cont tums x4 with meals    Fluids: 1500 mL fluid restriction  Electrolytes: replete prn  Nutrition: cardiac/renal diet  GI Ppx: protonix 40mg  PO  DVT Ppx: subcutaneous heparin    Oxygenation: 2 L NC  Antibiotics: Not indicated    Dispo: 79yo F w/ pmhx of HFpEF and ESRD on HD (MWF) admitted to observation for chest pain with dialysis. Likely stable angina 2/2 demand ischemia exacerbated by hypoperfusion from hypotension during hemodialysis. Estimated LOS < 48 hrs.    Manuel Duran DO  Internal Medicine PGY1

## 2024-01-23 NOTE — PROGRESS NOTES
01/23/24 0951   Discharge Planning   Living Arrangements Other (Comment)  ([facility])   Support Systems Sikhism/jami community;Family members   Assistance Needed Patient is A&Ox3, x1 assist with ADL's, uses a rollator and cane for ambulation, wears 2-3L O2 at baseline.   Do you have animals or pets at home? No   Who is requesting discharge planning? Provider   Home or Post Acute Services   ([facility])   Patient expects to be discharged to: Return to [facility]   Does the patient need discharge transport arranged? Yes   RoundTrip coordination needed? Yes   Has discharge transport been arranged? No   Financial Resource Strain   How hard is it for you to pay for the very basics like food, housing, medical care, and heating? Not hard   Housing Stability   In the last 12 months, was there a time when you were not able to pay the mortgage or rent on time? N   In the last 12 months, how many places have you lived? 1   In the last 12 months, was there a time when you did not have a steady place to sleep or slept in a shelter (including now)? N   Transportation Needs   In the past 12 months, has lack of transportation kept you from medical appointments or from getting medications? no   In the past 12 months, has lack of transportation kept you from meetings, work, or from getting things needed for daily living? No   Patient Choice   Provider Choice list and CMS website (https://medicare.gov/care-compare#search) for post-acute Quality and Resource Measure Data were provided and reviewed with: Patient   Patient / Family choosing to utilize agency / facility established prior to hospitalization Yes     1/23/24 at 1447: Patient medically ready for discharge. Called and spoke with Sister Chacorta to inform her that patient will discharge back today. Transport arranged for 1545. Bedside RN made aware and provided with nurse to nurse number.    1/23/24  at 1610: [facility] informed that  ambulance is unable to transport patient at this time. Call placed to Sister Shellie and Sister Chacorta to see if they are still able to send someone from facility to transport patient, no answer. Call made to [facility] who said they will get a hold of Sister Shellie and get back to us, awaiting call back.     1/23/24 at 1624: Received a call back from Shellie at [facility]. They will provide transport for patient and will bring patients oxygen from facility. Someone from [facility] will meet nursing staff at main entrance of hospital at 1700 to transport patient back to facility.

## 2024-01-27 LAB
ATRIAL RATE: 69 BPM
P AXIS: 55 DEGREES
P OFFSET: 191 MS
P ONSET: 140 MS
PR INTERVAL: 158 MS
Q ONSET: 219 MS
QRS COUNT: 12 BEATS
QRS DURATION: 92 MS
QT INTERVAL: 444 MS
QTC CALCULATION(BAZETT): 475 MS
QTC FREDERICIA: 465 MS
R AXIS: 0 DEGREES
T AXIS: 61 DEGREES
T OFFSET: 441 MS
VENTRICULAR RATE: 69 BPM

## 2024-01-30 ENCOUNTER — OFFICE VISIT (OUTPATIENT)
Dept: PULMONOLOGY | Facility: CLINIC | Age: 79
End: 2024-01-30
Payer: COMMERCIAL

## 2024-01-30 VITALS
BODY MASS INDEX: 32.03 KG/M2 | HEART RATE: 85 BPM | OXYGEN SATURATION: 94 % | SYSTOLIC BLOOD PRESSURE: 128 MMHG | DIASTOLIC BLOOD PRESSURE: 58 MMHG | RESPIRATION RATE: 16 BRPM | WEIGHT: 164 LBS

## 2024-01-30 DIAGNOSIS — J96.11 CHRONIC RESPIRATORY FAILURE WITH HYPOXIA (MULTI): Primary | ICD-10-CM

## 2024-01-30 DIAGNOSIS — G47.33 OSA ON CPAP: ICD-10-CM

## 2024-01-30 PROCEDURE — 1159F MED LIST DOCD IN RCRD: CPT | Performed by: INTERNAL MEDICINE

## 2024-01-30 PROCEDURE — 1157F ADVNC CARE PLAN IN RCRD: CPT | Performed by: INTERNAL MEDICINE

## 2024-01-30 PROCEDURE — 99214 OFFICE O/P EST MOD 30 MIN: CPT | Performed by: INTERNAL MEDICINE

## 2024-01-30 PROCEDURE — 1126F AMNT PAIN NOTED NONE PRSNT: CPT | Performed by: INTERNAL MEDICINE

## 2024-01-30 PROCEDURE — 3078F DIAST BP <80 MM HG: CPT | Performed by: INTERNAL MEDICINE

## 2024-01-30 PROCEDURE — 3074F SYST BP LT 130 MM HG: CPT | Performed by: INTERNAL MEDICINE

## 2024-01-30 PROCEDURE — 1036F TOBACCO NON-USER: CPT | Performed by: INTERNAL MEDICINE

## 2024-01-30 ASSESSMENT — PAIN SCALES - GENERAL: PAINLEVEL: 0-NO PAIN

## 2024-01-30 ASSESSMENT — PATIENT HEALTH QUESTIONNAIRE - PHQ9
SUM OF ALL RESPONSES TO PHQ9 QUESTIONS 1 AND 2: 0
2. FEELING DOWN, DEPRESSED OR HOPELESS: NOT AT ALL
1. LITTLE INTEREST OR PLEASURE IN DOING THINGS: NOT AT ALL

## 2024-01-30 ASSESSMENT — COLUMBIA-SUICIDE SEVERITY RATING SCALE - C-SSRS
2. HAVE YOU ACTUALLY HAD ANY THOUGHTS OF KILLING YOURSELF?: NO
6. HAVE YOU EVER DONE ANYTHING, STARTED TO DO ANYTHING, OR PREPARED TO DO ANYTHING TO END YOUR LIFE?: NO

## 2024-01-30 ASSESSMENT — ENCOUNTER SYMPTOMS: DEPRESSION: 0

## 2024-01-30 NOTE — PROGRESS NOTES
Department of Medicine  Division of Pulmonary, Critical Care, and Sleep Medicine  Follow-Up Visit  [facility] - [address], [address]    Physician HPI (1/30/2024):  Very pleasant 78-year-old female with history of hypertension, breast cancer, ESRD on HD, HEATH on CPAP presenting for follow-up.  Hospitalized twice since last visit, in December for hypertensive emergency and January for chest pain.  Still with significant dyspnea on exertion, reported oxygen goes down to mid low 80s on 2 L with activity, no productive cough, no fever or chills, no chest pain.      Immunization History   Administered Date(s) Administered    Hep A, Unspecified 05/15/2003    Influenza, High Dose Seasonal, Preservative Free 10/07/2018    Influenza, Unspecified 01/16/2014, 10/01/2019    Influenza, seasonal, injectable, preservative free 09/08/2015    Pfizer Purple Cap SARS-CoV-2 01/05/2021, 01/26/2021, 12/08/2021       Current Medications:  Current Outpatient Medications   Medication Instructions    acetaminophen (TYLENOL) 650 mg, oral, Every 8 hours PRN, Max 2000mg daily     amitriptyline (ELAVIL) 10 mg, oral, Daily    amLODIPine (NORVASC) 10 mg, oral, Daily    B complex-vitamin C-folic acid (Nephrocaps) 1 mg capsule 1 capsule, oral, Daily    Bacillus coagulans/inulin (PROBIOTIC FORMULA, INULIN, ORAL) 1 capsule, oral, Daily    calcitriol (ROCALTROL) 0.25 mcg, oral, Every Mon/Wed/Fri    carvedilol (Coreg) 25 mg tablet 1 tablet, oral, 2 times daily with meals    cholecalciferol (VITAMIN D-3) 50 mcg, oral, Daily    epoetin palomo (Procrit) 2,000 unit/mL injection 1 mL, subcutaneous, Every Mon/Wed/Fri    gabapentin (NEURONTIN) 100 mg, oral, Nightly PRN    guaiFENesin (MUCINEX) 600 mg, oral, Every 12 hours PRN    hydroxyurea (HYDREA) 500 mg, oral, Every Mon/Wed/Fri, M, W, F    loratadine (CLARITIN) 10 mg, oral, Daily PRN, In the morning    losartan (COZAAR) 100 mg, oral, Daily    meclizine (ANTIVERT) 25 mg, oral, Daily PRN     "omeprazole (PRILOSEC) 20 mg, oral, Daily PRN    oxygen (O2) gas therapy Oxygen   Refills: 0       Active    sennosides-docusate sodium (Senna-S) 8.6-50 mg tablet 1 tablet, oral, Nightly    sodium chloride (Ocean) 0.65 % nasal spray 2 sprays, Each Nostril, Every 1 hour PRN        Drug Allergies/Intolerances:  Allergies   Allergen Reactions    Bee Pollen Unknown    House Dust Unknown    House Dust Mite Unknown    Adhesive Tape-Silicones Rash          Visit Vitals  /58   Pulse 85   Resp 16   Wt 74.4 kg (164 lb)   SpO2 94%   BMI 32.03 kg/m²   Smoking Status Never   BSA 1.77 m²        Physical exam  Constitutional: Normal appearance.  HEENT: Normocephalic and atraumatic.  Cardiovascular: Normal rate and regular rhythm.  Pulmonary: Normal respiratory effort, bilateral clear breath sounds, no wheezing or rhonchi.  Musculoskeletal: No edema, no cyanosis.  Neurological: Awake, alert and oriented x3.  Psychiatric: Normal behavior, mood and affect.      Pulmonary Function Test Results     Pulmonary Functions Testing Results:    No results found for: \"FEV1\", \"FVC\", \"KYZ9ENE\", \"TLC\", \"DLCO\"      Chest Radiograph     XR chest 2 views 01/22/2024    Narrative  STUDY:  Chest Radiographs;  01/22/2024 1:55 PM  INDICATION:  Chest pain, shortness of breath.  Missed dialysis.  COMPARISON:  XR chest 12/03/2023, 06/04/2023.  ACCESSION NUMBER(S):  [id number]  ORDERING CLINICIAN:  Jacky Chow  TECHNIQUE:  Frontal and lateral chest.  FINDINGS:  The heart is minimally enlarged. No infiltrates or vascular congestion  are present. There are no pleural effusions. There is no pneumothorax.  Slight deviation of the upper trachea to the left suggests minimal  goiter.  Previous surgeries are seen in the right axilla and right shoulder.  Bony thorax is intact.    Impression  Minimal cardiomegaly. No infiltrates or vascular congestion. Remainder  as above.  Signed by Arturo Sy MD      Echocardiogram     Echocardiogram "     Narrative  [facility], [address] [address]  Tel [phone] and Fax [phone]    TRANSTHORACIC ECHOCARDIOGRAM REPORT      Patient Name:     ERENDIRA MORROW       Mateo Physician:   [id number] Jun RICKETTS MD  Study Date:       6/29/2023           Referring Physician: Pema Victoria MD  MRN/PID:          [id number]            PCP:  Accession/Order#: [id number]        Department Location: [facility] Non  Invasive  YOB: 1945          Fellow:  Gender:           F                   Nurse:  Admit Date:                           Sonographer:         Gina Holcomb [facility]  Admission Status: Outpatient          Additional Staff:  Height:           152.40 cm           CC Report to:  Weight:           73.94 kg            Study Type:          Echocardiogram  BSA:              1.71 m2  Blood Pressure: 120 /60 mmHg    Diagnosis/ICD: I50.32-Chronic diastolic (congestive) heart failure (CHF)  Indication:    Congestive Heart Failure  Procedure/CPT: Echo Complete w Full [id number]    Patient History:  Pertinent History: Chronic diastolic heart failure, CHF, Dyspnea, HTN,  Hyperlipidemia and PVD. Patient on 2L home O2.    Study Detail: The following Echo studies were performed: 2D, M-Mode, Doppler and  color flow. The patient was awake.      PHYSICIAN INTERPRETATION:  Left Ventricle: The left ventricular systolic function is normal, with an estimated ejection fraction of 55-60%. There are no regional wall motion abnormalities. The left ventricular cavity size is normal. Spectral Doppler shows an impaired relaxation pattern of left ventricular diastolic filling.  Left Atrium: The left atrium is moderately dilated.  Right Ventricle: The right ventricle is normal in size. There is normal right ventricular global systolic function.  Right Atrium: The right atrium is mildly dilated.  Aortic Valve: The aortic valve appears structurally  normal. There is mild aortic valve regurgitation. The peak instantaneous gradient of the aortic valve is 9.6 mmHg. The mean gradient of the aortic valve is 5.4 mmHg.  Mitral Valve: The mitral valve is normal in structure. There is mild to moderate mitral valve regurgitation.  Tricuspid Valve: The tricuspid valve is structurally normal. There is mild tricuspid regurgitation.  Pulmonic Valve: The pulmonic valve is not well visualized. The pulmonic valve regurgitation was not well visualized.  Pericardium: There is no pericardial effusion noted.  Aorta: The aortic root is normal.  Pulmonary Artery: The tricuspid regurgitant velocity is 3.14 m/s, and with an estimated right atrial pressure of 10 mmHg, the estimated pulmonary artery pressure is mildly elevated with the RVSP at 49.5 mmHg.  Systemic Veins: The inferior vena cava appears to be of normal size.      CONCLUSIONS:  1. Left ventricular systolic function is normal with a 55-60% estimated ejection fraction.  2. Spectral Doppler shows an impaired relaxation pattern of left ventricular diastolic filling.  3. The left atrium is moderately dilated.  4. Mild to moderate mitral valve regurgitation.  5. Mild aortic valve regurgitation.  6. There is mild tricuspid regurgitation.  7. The estimated pulmonary artery pressure is mild-moderately elevated with the RVSP at 49.5 mmHg.    QUANTITATIVE DATA SUMMARY:  2D MEASUREMENTS:  Normal Ranges:  IVSd:          1.38 cm    (0.6-1.1cm)  LVPWd:         1.36 cm    (0.6-1.1cm)  LVIDd:         4.23 cm    (3.9-5.9cm)  LVIDs:         2.87 cm  LV Mass Index: 128.3 g/m2  LV % FS        32.3 %    LA VOLUME:  Normal Ranges:  LA Vol A4C:        78.2 ml    (22+/-6mL/m2)  LA Vol A2C:        73.5 ml  LA Vol BP:         77.8 ml  LA Vol Index A4C:  45.7ml/m2  LA Vol Index A2C:  42.9 ml/m2  LA Vol Index BP:   45.5 ml/m2  LA Area A4C:       23.5 cm2  LA Area A2C:       22.2 cm2  LA Major Axis A4C: 6.0 cm  LA Major Axis A2C: 5.7 cm  LA Volume  Index:   45.7 ml/m2  LA Vol A4C:        72.5 ml  LA Vol A2C:        68.1 ml    RA VOLUME BY A/L METHOD:  Normal Ranges:  RA Area A4C: 16.7 cm2    M-MODE MEASUREMENTS:  Normal Ranges:  Ao Root: 3.20 cm (2.0-3.7cm)  AoV Exc: 1.68 cm (1.5-2.5cm)  LAs:     4.02 cm (2.7-4.0cm)    AORTA MEASUREMENTS:  Normal Ranges:  AoV Exc:     1.68 cm (1.5-2.5cm)  Ao Sinus, d: 3.00 cm (2.1-3.5cm)  Asc Ao, d:   2.70 cm (2.1-3.4cm)    LV SYSTOLIC FUNCTION BY 2D PLANIMETRY (MOD):  Normal Ranges:  EF-A4C View: 57.5 % (>=55%)  EF-A2C View: 60.7 %  EF-Biplane:  59.8 %    LV DIASTOLIC FUNCTION:  Normal Ranges:  MV Peak E:        1.17 m/s    (0.7-1.2 m/s)  MV Peak A:        0.99 m/s    (0.42-0.7 m/s)  E/A Ratio:        1.18        (1.0-2.2)  MV e'             0.09 m/s    (>8.0)  MV lateral e'     0.09 m/s  MV medial e'      0.05 m/s  MV A Dur:         136.10 msec  E/e' Ratio:       13.00       (<8.0)  PulmV Sys Chidi:    76.30 cm/s  PulmV Wells Chidi:   64.52 cm/s  PulmV S/D Chidi:    1.18  PulmV A Revs Chidi: 22.72 cm/s  PulmV A Revs Dur: 103.81 msec    MITRAL VALVE:  Normal Ranges:  MV Vmax:    1.25 m/s (<=1.3m/s)  MV peak P.3 mmHg (<5mmHg)  MV mean P.5 mmHg (<2mmHg)  MV VTI:     32.29 cm (10-13cm)  MV DT:      163 msec (150-240msec)    AORTIC VALVE:  Normal Ranges:  AoV Vmax:                1.55 m/s (<=1.7m/s)  AoV Peak P.6 mmHg (<20mmHg)  AoV Mean P.4 mmHg (1.7-11.5mmHg)  LVOT Max Chidi:            1.02 m/s (<=1.1m/s)  AoV VTI:                 41.23 cm (18-25cm)  LVOT VTI:                25.78 cm  LVOT Diameter:           2.02 cm  (1.8-2.4cm)  AoV Area, VTI:           2.01 cm2 (2.5-5.5cm2)  AoV Area,Vmax:           2.11 cm2 (2.5-4.5cm2)  AoV Dimensionless Index: 0.63    AORTIC INSUFFICIENCY:  AI Vmax:       3.61 m/s  AI Half-time:  359 msec  AI Decel Time: 1238 msec  AI Decel Rate: 291.96 cm/s2      RIGHT VENTRICLE:  RV Basal 3.00 cm  RV Mid   2.40 cm  RV Major 7.5 cm  TAPSE:   25.0 mm  RV s'    0.00  "m/s    TRICUSPID VALVE/RVSP:  Normal Ranges:  Peak TR Velocity: 3.14 m/s  RV Syst Pressure: 49.5 mmHg (< 30mmHg)  TV E Vmax:        0.34 m/s  (0.3-0.7m/s)  TV A Vmax:        0.50 m/s  IVC Diam:         2.14 cm    PULMONIC VALVE:  Normal Ranges:  PV Max Chidi: 1.5 m/s  (0.6-0.9m/s)  PV Max P.6 mmHg    Pulmonary Veins:  PulmV A Revs Dur: 103.81 msec  PulmV A Revs Chidi: 22.72 cm/s  PulmV Wells Chidi:   64.52 cm/s  PulmV S/D Chidi:    1.18  PulmV Sys Chidi:    76.30 cm/s    AORTA:  Asc Ao Diam 2.74 cm      [id number] Jun Ruiz MD  Electronically signed on 2023 at 5:25:57 PM         Final        Chest CT Scan     No results found for this or any previous visit from the past 365 days.       Laboratory Studies     Lab Results   Component Value Date    WBC 8.3 2024    HGB 10.9 (L) 2024    HCT 32.8 (L) 2024     (H) 2024     2024      Lab Results   Component Value Date    GLUCOSE 83 2024    CALCIUM 8.6 2024     (L) 2024    K 4.0 2024    CO2 26 2024    CL 97 (L) 2024    BUN 45 (H) 2024    CREATININE 6.45 (H) 2024      Lab Results   Component Value Date    ALT 20 2024    AST 21 2024     (H) 2023    ALKPHOS 184 (H) 2024    BILITOT 0.4 2024        Sputum Culture     No results found for: \"AFBCX\"   No results found for: \"RESPCULTCYFI\"  No results found for the last 90 days.          Assessment and Plan / Recommendations     Very pleasant 78-year-old female with history of hypertension, breast cancer, ESRD on HD, HEATH on CPAP presenting for follow-up.  Hospitalized twice since last visit, in December for hypertensive emergency and January for chest pain.    1.  Chronic hypoxic respiratory failure, still with dyspnea on exertion and feels like she needs more oxygen when she walks  Desat/oxygen titration done in office today, should use 2 L at rest, 4 L with activity.  If still having hypoxia with " activity may need continuous oxygen instead of pulse.    1.  HEATH on CPAP 15, good compliance, no complaints.    -Continue CPAP current settings, encouraged to use it every night   -Return to clinic in 1 year or sooner with any concerns     This dictation was created using voice recognition software. Phonetic and/or minor grammatical errors may exist.              Mercedes Manning MD   01/30/2024

## 2024-01-30 NOTE — PATIENT INSTRUCTIONS
1.  Chronic hypoxic respiratory failure   -use 2 L at rest, 4 L with activity.  If still having hypoxia with activity please call my office to switch to continuous oxygen instead of pulse.    1.  HEATH on CPAP 15, good compliance, no complaints.    -Continue CPAP current settings, encouraged to use it every night   -Return to clinic in 1 year or sooner with any concerns

## 2024-02-08 ENCOUNTER — APPOINTMENT (OUTPATIENT)
Dept: PULMONOLOGY | Facility: CLINIC | Age: 79
End: 2024-02-08
Payer: MEDICARE

## 2024-02-13 ENCOUNTER — OFFICE VISIT (OUTPATIENT)
Dept: CARDIOLOGY | Facility: HOSPITAL | Age: 79
End: 2024-02-13
Payer: MEDICARE

## 2024-02-13 VITALS
HEART RATE: 64 BPM | WEIGHT: 161 LBS | SYSTOLIC BLOOD PRESSURE: 132 MMHG | BODY MASS INDEX: 31.44 KG/M2 | DIASTOLIC BLOOD PRESSURE: 65 MMHG | OXYGEN SATURATION: 94 %

## 2024-02-13 DIAGNOSIS — R07.9 CHEST PAIN, UNSPECIFIED TYPE: Primary | ICD-10-CM

## 2024-02-13 PROCEDURE — 1159F MED LIST DOCD IN RCRD: CPT | Performed by: NURSE PRACTITIONER

## 2024-02-13 PROCEDURE — 1157F ADVNC CARE PLAN IN RCRD: CPT | Performed by: NURSE PRACTITIONER

## 2024-02-13 PROCEDURE — 1126F AMNT PAIN NOTED NONE PRSNT: CPT | Performed by: NURSE PRACTITIONER

## 2024-02-13 PROCEDURE — 1036F TOBACCO NON-USER: CPT | Performed by: NURSE PRACTITIONER

## 2024-02-13 PROCEDURE — 3078F DIAST BP <80 MM HG: CPT | Performed by: NURSE PRACTITIONER

## 2024-02-13 PROCEDURE — 3075F SYST BP GE 130 - 139MM HG: CPT | Performed by: NURSE PRACTITIONER

## 2024-02-13 PROCEDURE — 99214 OFFICE O/P EST MOD 30 MIN: CPT | Performed by: NURSE PRACTITIONER

## 2024-02-13 PROCEDURE — 99214 OFFICE O/P EST MOD 30 MIN: CPT | Mod: 25 | Performed by: NURSE PRACTITIONER

## 2024-02-13 RX ORDER — ATORVASTATIN CALCIUM 20 MG/1
20 TABLET, FILM COATED ORAL NIGHTLY
Qty: 30 TABLET | Refills: 1 | Status: SHIPPED | OUTPATIENT
Start: 2024-02-13 | End: 2024-03-11 | Stop reason: SDUPTHER

## 2024-02-13 NOTE — PROGRESS NOTES
Chief Complaint:   Hospital follow up      History Of Present Illness:    Joyce Bethea is a 78 y.o. 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  Was at dialysis when she started to feel lightheaded with subsequent chest pain located substernally and radiating across the diaphragm.  She was hypertensive with -200mmHg at the time.  HD was stopped and she was given 1 SL nitroglycerin with resolution of symptoms.  She was evaluated in the ER with observation admission.  Troponin was very mildly elevated with flat trend (24, 24, 19, 23).  She was recently started on amlodipine 5mg daily by her Nephrologist, Dr. Camara, and was then advised to increase the dose to 10mg daily during admit.  She also has had issues with intermittent hypotension while at dialysis, so she was advised per Nephrology to reduce amlodpine back to 5mg daily.  Has not had chest pain since this event, and BP has been improved.      Last Recorded Vitals:  Vitals:    02/13/24 0958   BP: 132/65   Pulse: 64   SpO2: 94%   Weight: 73 kg (161 lb)       Past Medical History:  She has a past medical history of Body mass index (BMI) 31.0-31.9, adult (04/12/2019), Encounter for follow-up examination after completed treatment for conditions other than malignant neoplasm (11/30/2017), Other conditions influencing health status (03/12/2019), Other specified disorders of bone, unspecified site (03/11/2015), Personal history of malignant neoplasm of breast (02/05/2014), Personal history of malignant neoplasm, unspecified, Personal history of other diseases of the circulatory system, Personal history of other diseases of the digestive system, Personal history of other  diseases of the musculoskeletal system and connective tissue (01/21/2016), Personal history of other diseases of the musculoskeletal system and connective tissue, Personal history of other diseases of the nervous system and sense organs, Personal history of other diseases of urinary system, Respiratory failure, unspecified with hypoxia (CMS/HCC) (12/29/2017), Shortness of breath (10/06/2017), Unspecified cataract, and Unspecified fracture of unspecified forearm, sequela (10/04/2018).    Past Surgical History:  She has a past surgical history that includes Colonoscopy (02/05/2014); Tonsillectomy (02/05/2014); Mastectomy (02/05/2014); Rotator cuff repair (04/01/2014); Knee arthroscopy w/ meniscal repair (04/01/2014); Other surgical history (09/21/2021); MR angio head wo IV contrast (2/23/2019); MR angio neck wo IV contrast (2/23/2019); and IR CVC tunneled (10/12/2021).      Social History:  She reports that she has never smoked. She has never used smokeless tobacco. She reports that she does not drink alcohol and does not use drugs.    Family History:  Family History   Problem Relation Name Age of Onset    Hypertension Mother      Leukemia Mother      Osteoarthritis Mother      Colon cancer Father      Diabetes Father      Hypertension Father      Osteoarthritis Sister      Osteoarthritis Other Aunt     Stroke Other Aunt     Stroke Other Uncle     Breast cancer Other Family History         Allergies:  Bee pollen, House dust, House dust mite, and Adhesive tape-silicones    Outpatient Medications:  Current Outpatient Medications   Medication Instructions    acetaminophen (TYLENOL) 650 mg, oral, Every 8 hours PRN, Max 2000mg daily     amitriptyline (ELAVIL) 10 mg, oral, Daily    amLODIPine (NORVASC) 10 mg, oral, Daily    B complex-vitamin C-folic acid (Nephrocaps) 1 mg capsule 1 capsule, oral, Daily    Bacillus coagulans/inulin (PROBIOTIC FORMULA, INULIN, ORAL) 1 capsule, oral, Daily    calcitriol (ROCALTROL) 0.25 mcg,  oral, Every Mon/Wed/Fri    carvedilol (Coreg) 25 mg tablet 1 tablet, oral, 2 times daily with meals    cholecalciferol (VITAMIN D-3) 50 mcg, oral, Daily    epoetin palomo (Procrit) 2,000 unit/mL injection 1 mL, subcutaneous, Every Mon/Wed/Fri    guaiFENesin (MUCINEX) 600 mg, oral, Every 12 hours PRN    hydroxyurea (HYDREA) 500 mg, oral, Every Mon/Wed/Fri, M, W, F    loratadine (CLARITIN) 10 mg, oral, Daily PRN, In the morning    losartan (COZAAR) 100 mg, oral, Daily    omeprazole (PRILOSEC) 20 mg, oral, Daily PRN    oxygen (O2) gas therapy Oxygen   Refills: 0       Active    sennosides-docusate sodium (Senna-S) 8.6-50 mg tablet 1 tablet, oral, Nightly    sodium chloride (Ocean) 0.65 % nasal spray 2 sprays, Each Nostril, Every 1 hour PRN       Physical Exam:  Constitutional: Pleasant, Awake/Alert/Oriented to person place and time. No distress  Head: Atraumatic, Normocephalic  Eyes: EOMI. MAK  Neck: No JVD  Cardiovascular: Regular rate and rhythm, S1, S2. No extra heart sounds or murmurs  Respiratory: Expiratory wheezing   Abdomen: Soft, Non-tender. Bowel sounds appreciated  Musculoskeletal: ROM intact. Muscle strength grossly intact upper and lower extremities 5/5.   Neurological: CNII-XII intact. Sensation grossly intact  Extremities: Warm and dry. No acute rashes and lesions  Psychiatric: Appropriate mood and affect       Last Labs:  CBC -  Lab Results   Component Value Date    WBC 8.3 01/23/2024    HGB 10.9 (L) 01/23/2024    HCT 32.8 (L) 01/23/2024     (H) 01/23/2024     01/23/2024       CMP -  Lab Results   Component Value Date    CALCIUM 8.6 01/23/2024    PHOS 4.6 01/23/2024    PROT 6.4 01/22/2024    ALBUMIN 3.3 (L) 01/23/2024    AST 21 01/22/2024    ALT 20 01/22/2024    ALKPHOS 184 (H) 01/22/2024    BILITOT 0.4 01/22/2024       LIPID PANEL -   Lab Results   Component Value Date    CHOL 198 10/03/2021    TRIG 92 10/03/2021    HDL 74.9 10/03/2021    CHHDL 2.6 10/03/2021    LDLF 105 (H) 10/03/2021     VLDL 18 10/03/2021       RENAL FUNCTION PANEL -   Lab Results   Component Value Date    GLUCOSE 83 01/23/2024     (L) 01/23/2024    K 4.0 01/23/2024    CL 97 (L) 01/23/2024    CO2 26 01/23/2024    ANIONGAP 16 01/23/2024    BUN 45 (H) 01/23/2024    CREATININE 6.45 (H) 01/23/2024    CALCIUM 8.6 01/23/2024    PHOS 4.6 01/23/2024    ALBUMIN 3.3 (L) 01/23/2024        Lab Results   Component Value Date     (H) 01/22/2024       Last Cardiology Tests:  ECG:  ECG 12 lead 01/22/2024 (Preliminary)    NSR, HR 69bpm     Echo:  6/29/2023  CONCLUSIONS:  1. Left ventricular systolic function is normal with a 55-60% estimated ejection fraction.  2. Spectral Doppler shows an impaired relaxation pattern of left ventricular diastolic filling.  3. The left atrium is moderately dilated.  4. Mild to moderate mitral valve regurgitation.  5. Mild aortic valve regurgitation.  6. There is mild tricuspid regurgitation.  7. The estimated pulmonary artery pressure is mild-moderately elevated with the RVSP at 49.5 mmHg.    10/4/2021 echo:  CONCLUSIONS:  1. The left ventricular systolic function is normal with a 60-65% estimated ejection fraction.  2. Spectral Doppler shows an impaired relaxation pattern of left ventricular diastolic filling.  3. There is mild mitral and tricuspid regurgitation.  4. Moderately elevated pulmonary artery pressure, estimated RVSP 57mmHg.      Lab review: I have personally reviewed the laboratory result(s)   Diagnostic review: I have personally reviewed the result(s) of the Echocardiogram .     Assessment/Plan   Very pleasant 78 y.o. 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  Was having symptoms of  lightheadedness and chest pain intermittently at dialysis with labile BP-- was elevated 200/90 down to 106/60's.  BP has remained stable over the past few weeks without recurrent chest pain symptoms.     Plan:  -Continue amlodipine 5mg daily  -Continue coreg 25mg BID and losartan 100mg daily   -Dicussed starting aspirin therapy, however, patient states she had h/o epistaxis when taking aspirin in the past.  Will hold off for now.   -Will start low dose atorvastatin 20mg daily   -Consider further ischemic evaluation if patient has recurrent or progressive chest pain despite BP control.   -Follow up in 6 months or sooner if needed       ANASTASIA Acevedo-CNP

## 2024-02-14 ENCOUNTER — TELEPHONE (OUTPATIENT)
Dept: CARDIOLOGY | Facility: HOSPITAL | Age: 79
End: 2024-02-14
Payer: MEDICARE

## 2024-02-15 NOTE — TELEPHONE ENCOUNTER
Can you get the dosing information and add them back to her list please and then send the list to Vahe Ellis. I did not cancel them. Thank you.

## 2024-02-19 RX ORDER — GABAPENTIN 100 MG/1
100 CAPSULE ORAL NIGHTLY PRN
COMMUNITY
End: 2024-05-14 | Stop reason: ALTCHOICE

## 2024-02-19 RX ORDER — MECLIZINE HYDROCHLORIDE 25 MG/1
25 TABLET ORAL DAILY PRN
COMMUNITY

## 2024-03-11 DIAGNOSIS — R07.9 CHEST PAIN, UNSPECIFIED TYPE: ICD-10-CM

## 2024-03-11 DIAGNOSIS — E78.2 MIXED HYPERLIPIDEMIA: ICD-10-CM

## 2024-03-11 RX ORDER — ATORVASTATIN CALCIUM 20 MG/1
20 TABLET, FILM COATED ORAL NIGHTLY
Qty: 30 TABLET | Refills: 1 | Status: SHIPPED | OUTPATIENT
Start: 2024-03-11 | End: 2024-05-09

## 2024-05-09 DIAGNOSIS — E78.2 MIXED HYPERLIPIDEMIA: ICD-10-CM

## 2024-05-09 RX ORDER — ATORVASTATIN CALCIUM 20 MG/1
20 TABLET, FILM COATED ORAL NIGHTLY
Qty: 90 TABLET | Refills: 1 | Status: SHIPPED | OUTPATIENT
Start: 2024-05-09

## 2024-05-14 ENCOUNTER — OFFICE VISIT (OUTPATIENT)
Dept: CARDIOLOGY | Facility: HOSPITAL | Age: 79
End: 2024-05-14
Payer: COMMERCIAL

## 2024-05-14 VITALS
HEART RATE: 76 BPM | OXYGEN SATURATION: 93 % | SYSTOLIC BLOOD PRESSURE: 114 MMHG | WEIGHT: 161 LBS | DIASTOLIC BLOOD PRESSURE: 48 MMHG | BODY MASS INDEX: 31.44 KG/M2

## 2024-05-14 DIAGNOSIS — I10 BENIGN ESSENTIAL HYPERTENSION: ICD-10-CM

## 2024-05-14 PROCEDURE — 3078F DIAST BP <80 MM HG: CPT | Performed by: NURSE PRACTITIONER

## 2024-05-14 PROCEDURE — 99214 OFFICE O/P EST MOD 30 MIN: CPT | Performed by: NURSE PRACTITIONER

## 2024-05-14 PROCEDURE — 3074F SYST BP LT 130 MM HG: CPT | Performed by: NURSE PRACTITIONER

## 2024-05-14 PROCEDURE — 1157F ADVNC CARE PLAN IN RCRD: CPT | Performed by: NURSE PRACTITIONER

## 2024-05-14 PROCEDURE — 1159F MED LIST DOCD IN RCRD: CPT | Performed by: NURSE PRACTITIONER

## 2024-05-14 PROCEDURE — 1036F TOBACCO NON-USER: CPT | Performed by: NURSE PRACTITIONER

## 2024-05-14 RX ORDER — AMLODIPINE BESYLATE 5 MG/1
5 TABLET ORAL DAILY
Start: 2024-05-14

## 2024-05-14 NOTE — PROGRESS NOTES
Chief Complaint:   6 month follow up      History Of Present Illness:    Joyce Bethea is a 78 y.o. 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  She is doing quite well from a cardiac standpoint.  Denies symptoms of chest pain or pressure.  Continues to wear 2l NC continuously-- denies progressive SOB.  BP has been well controlled and weight has remained stable.      Last Recorded Vitals:  Vitals:    05/14/24 1024   BP: (!) 114/48   Pulse: 76   SpO2: 93%   Weight: 73 kg (161 lb)       Past Medical History:  She has a past medical history of Body mass index (BMI) 31.0-31.9, adult (04/12/2019), Encounter for follow-up examination after completed treatment for conditions other than malignant neoplasm (11/30/2017), Other conditions influencing health status (03/12/2019), Other specified disorders of bone, unspecified site (03/11/2015), Personal history of malignant neoplasm of breast (02/05/2014), Personal history of malignant neoplasm, unspecified, Personal history of other diseases of the circulatory system, Personal history of other diseases of the digestive system, Personal history of other diseases of the musculoskeletal system and connective tissue (01/21/2016), Personal history of other diseases of the musculoskeletal system and connective tissue, Personal history of other diseases of the nervous system and sense organs, Personal history of other diseases of urinary system, Respiratory failure, unspecified with hypoxia (Multi) (12/29/2017), Shortness of breath (10/06/2017), Unspecified cataract, and Unspecified fracture of unspecified forearm, sequela (10/04/2018).    Past Surgical History:  She has a past surgical history that includes Colonoscopy (02/05/2014); Tonsillectomy (02/05/2014);  Mastectomy (02/05/2014); Rotator cuff repair (04/01/2014); Knee arthroscopy w/ meniscal repair (04/01/2014); Other surgical history (09/21/2021); MR angio head wo IV contrast (2/23/2019); MR angio neck wo IV contrast (2/23/2019); and IR CVC tunneled (10/12/2021).      Social History:  She reports that she has never smoked. She has never used smokeless tobacco. She reports that she does not drink alcohol and does not use drugs.    Family History:  Family History   Problem Relation Name Age of Onset    Hypertension Mother      Leukemia Mother      Osteoarthritis Mother      Colon cancer Father      Diabetes Father      Hypertension Father      Osteoarthritis Sister      Osteoarthritis Other Aunt     Stroke Other Aunt     Stroke Other Uncle     Breast cancer Other Family History         Allergies:  Bee pollen, House dust, House dust mite, and Adhesive tape-silicones    Outpatient Medications:  Current Outpatient Medications   Medication Instructions    acetaminophen (TYLENOL) 650 mg, oral, Every 8 hours PRN, Max 2000mg daily     amitriptyline (ELAVIL) 10 mg, oral, Daily    amLODIPine (NORVASC) 5 mg, oral, Daily    atorvastatin (LIPITOR) 20 mg, oral, Nightly    B complex-vitamin C-folic acid (Nephrocaps) 1 mg capsule 1 capsule, oral, Daily    Bacillus coagulans/inulin (PROBIOTIC FORMULA, INULIN, ORAL) 1 capsule, oral, Daily    calcitriol (ROCALTROL) 0.25 mcg, oral, Every Mon/Wed/Fri    carvedilol (Coreg) 25 mg tablet 1 tablet, oral, 2 times daily with meals    cholecalciferol (VITAMIN D-3) 50 mcg, oral, Daily    epoetin palomo (Procrit) 2,000 unit/mL injection 1 mL, subcutaneous, Every Mon/Wed/Fri    guaiFENesin (MUCINEX) 600 mg, oral, Every 12 hours PRN    hydroxyurea (HYDREA) 500 mg, oral, Every Mon/Wed/Fri, M, W, F    loratadine (CLARITIN) 10 mg, oral, Daily PRN, In the morning    losartan (COZAAR) 100 mg, oral, Daily    meclizine (ANTIVERT) 25 mg, oral, Daily PRN    omeprazole (PRILOSEC) 20 mg, oral, Daily PRN     oxygen (O2) gas therapy Oxygen   Refills: 0       Active    sennosides-docusate sodium (Senna-S) 8.6-50 mg tablet 1 tablet, oral, Nightly    sodium chloride (Ocean) 0.65 % nasal spray 2 sprays, Each Nostril, Every 1 hour PRN       Physical Exam:  Constitutional: Pleasant, Awake/Alert/Oriented to person place and time. No distress  Head: Atraumatic, Normocephalic  Eyes: EOMI. MAK  Neck: No JVD  Cardiovascular: Regular rate and rhythm, S1, S2. 2/6 murmur RUSB   Respiratory: Clear to auscultation bilaterally. No wheezing, rales or rhonchi. Good chest wall expansion  Musculoskeletal: ROM intact. Muscle strength grossly intact upper and lower extremities 5/5.   Neurological: CNII-XII intact. Sensation grossly intact  Extremities: Warm and dry. No acute rashes and lesions  Psychiatric: Appropriate mood and affect       Last Labs:  CBC -  Lab Results   Component Value Date    WBC 8.3 01/23/2024    HGB 10.9 (L) 01/23/2024    HCT 32.8 (L) 01/23/2024     (H) 01/23/2024     01/23/2024       CMP -  Lab Results   Component Value Date    CALCIUM 8.6 01/23/2024    PHOS 4.6 01/23/2024    PROT 6.4 01/22/2024    ALBUMIN 3.3 (L) 01/23/2024    AST 21 01/22/2024    ALT 20 01/22/2024    ALKPHOS 184 (H) 01/22/2024    BILITOT 0.4 01/22/2024       LIPID PANEL -   Lab Results   Component Value Date    CHOL 198 10/03/2021    TRIG 92 10/03/2021    HDL 74.9 10/03/2021    CHHDL 2.6 10/03/2021    LDLF 105 (H) 10/03/2021    VLDL 18 10/03/2021       RENAL FUNCTION PANEL -   Lab Results   Component Value Date    GLUCOSE 83 01/23/2024     (L) 01/23/2024    K 4.0 01/23/2024    CL 97 (L) 01/23/2024    CO2 26 01/23/2024    ANIONGAP 16 01/23/2024    BUN 45 (H) 01/23/2024    CREATININE 6.45 (H) 01/23/2024    CALCIUM 8.6 01/23/2024    PHOS 4.6 01/23/2024    ALBUMIN 3.3 (L) 01/23/2024        Lab Results   Component Value Date     (H) 01/22/2024       Last Cardiology Tests:    Echo:  6/29/2023  CONCLUSIONS:  1. Left ventricular  systolic function is normal with a 55-60% estimated ejection fraction.  2. Spectral Doppler shows an impaired relaxation pattern of left ventricular diastolic filling.  3. The left atrium is moderately dilated.  4. Mild to moderate mitral valve regurgitation.  5. Mild aortic valve regurgitation.  6. There is mild tricuspid regurgitation.  7. The estimated pulmonary artery pressure is mild-moderately elevated with the RVSP at 49.5 mmHg.     10/4/2021 echo:  CONCLUSIONS:  1. The left ventricular systolic function is normal with a 60-65% estimated ejection fraction.  2. Spectral Doppler shows an impaired relaxation pattern of left ventricular diastolic filling.  3. There is mild mitral and tricuspid regurgitation.  4. Moderately elevated pulmonary artery pressure, estimated RVSP 57mmHg.    Lab review: I have personally reviewed the laboratory result(s)   Diagnostic review: I have personally reviewed the result(s) of the Echocardiogram     Assessment/Plan   Very pleasant 78 y.o. 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 Doing very well from a cardiac standpoint.     Plan:  -Continue amlodipine 5mg daily, losartan 100mg daily and coreg 25mg BID  -Continue atorvastatin 20mg daily  -Intolerant of aspirin in the past due to epistaxis when taking  -Follow up in 1 year or sooner if needed     ANASTASIA Acevedo-CNP

## 2024-06-13 ENCOUNTER — APPOINTMENT (OUTPATIENT)
Dept: HEMATOLOGY/ONCOLOGY | Facility: CLINIC | Age: 79
End: 2024-06-13
Payer: MEDICAID

## 2024-06-20 ENCOUNTER — OFFICE VISIT (OUTPATIENT)
Dept: HEMATOLOGY/ONCOLOGY | Facility: CLINIC | Age: 79
End: 2024-06-20
Payer: MEDICARE

## 2024-06-20 VITALS
RESPIRATION RATE: 18 BRPM | SYSTOLIC BLOOD PRESSURE: 135 MMHG | BODY MASS INDEX: 31.25 KG/M2 | TEMPERATURE: 97.2 F | OXYGEN SATURATION: 96 % | HEART RATE: 93 BPM | WEIGHT: 160 LBS | DIASTOLIC BLOOD PRESSURE: 70 MMHG

## 2024-06-20 DIAGNOSIS — D47.3 ESSENTIAL THROMBOCYTHEMIA (MULTI): Primary | ICD-10-CM

## 2024-06-20 LAB
ALBUMIN SERPL BCP-MCNC: 3.7 G/DL (ref 3.4–5)
ALP SERPL-CCNC: 207 U/L (ref 33–136)
ALT SERPL W P-5'-P-CCNC: 19 U/L (ref 7–45)
ANION GAP SERPL CALC-SCNC: 17 MMOL/L (ref 10–20)
AST SERPL W P-5'-P-CCNC: 20 U/L (ref 9–39)
BASOPHILS # BLD AUTO: 0.06 X10*3/UL (ref 0–0.1)
BASOPHILS NFR BLD AUTO: 0.7 %
BILIRUB SERPL-MCNC: 0.3 MG/DL (ref 0–1.2)
BUN SERPL-MCNC: 42 MG/DL (ref 6–23)
CALCIUM SERPL-MCNC: 9.8 MG/DL (ref 8.6–10.3)
CHLORIDE SERPL-SCNC: 93 MMOL/L (ref 98–107)
CO2 SERPL-SCNC: 32 MMOL/L (ref 21–32)
CREAT SERPL-MCNC: 5.87 MG/DL (ref 0.5–1.05)
EGFRCR SERPLBLD CKD-EPI 2021: 7 ML/MIN/1.73M*2
EOSINOPHIL # BLD AUTO: 0.25 X10*3/UL (ref 0–0.4)
EOSINOPHIL NFR BLD AUTO: 3.1 %
ERYTHROCYTE [DISTWIDTH] IN BLOOD BY AUTOMATED COUNT: 13.5 % (ref 11.5–14.5)
FERRITIN SERPL-MCNC: 1272 NG/ML (ref 8–150)
GLUCOSE SERPL-MCNC: 92 MG/DL (ref 74–99)
HCT VFR BLD AUTO: 30.5 % (ref 36–46)
HGB BLD-MCNC: 9.8 G/DL (ref 12–16)
IMM GRANULOCYTES # BLD AUTO: 0.06 X10*3/UL (ref 0–0.5)
IMM GRANULOCYTES NFR BLD AUTO: 0.7 % (ref 0–0.9)
IRON SATN MFR SERPL: 47 % (ref 25–45)
IRON SERPL-MCNC: 107 UG/DL (ref 35–150)
LYMPHOCYTES # BLD AUTO: 1.62 X10*3/UL (ref 0.8–3)
LYMPHOCYTES NFR BLD AUTO: 20.1 %
MCH RBC QN AUTO: 33.8 PG (ref 26–34)
MCHC RBC AUTO-ENTMCNC: 32.1 G/DL (ref 32–36)
MCV RBC AUTO: 105 FL (ref 80–100)
MONOCYTES # BLD AUTO: 1.13 X10*3/UL (ref 0.05–0.8)
MONOCYTES NFR BLD AUTO: 14 %
NEUTROPHILS # BLD AUTO: 4.94 X10*3/UL (ref 1.6–5.5)
NEUTROPHILS NFR BLD AUTO: 61.4 %
PLATELET # BLD AUTO: 414 X10*3/UL (ref 150–450)
POTASSIUM SERPL-SCNC: 4.4 MMOL/L (ref 3.5–5.3)
PROT SERPL-MCNC: 6 G/DL (ref 6.4–8.2)
RBC # BLD AUTO: 2.9 X10*6/UL (ref 4–5.2)
SODIUM SERPL-SCNC: 138 MMOL/L (ref 136–145)
TIBC SERPL-MCNC: 228 UG/DL (ref 240–445)
UIBC SERPL-MCNC: 121 UG/DL (ref 110–370)
WBC # BLD AUTO: 8.1 X10*3/UL (ref 4.4–11.3)

## 2024-06-20 PROCEDURE — 85025 COMPLETE CBC W/AUTO DIFF WBC: CPT | Performed by: PHYSICIAN ASSISTANT

## 2024-06-20 PROCEDURE — 3078F DIAST BP <80 MM HG: CPT | Performed by: PHYSICIAN ASSISTANT

## 2024-06-20 PROCEDURE — 99214 OFFICE O/P EST MOD 30 MIN: CPT | Performed by: PHYSICIAN ASSISTANT

## 2024-06-20 PROCEDURE — 3075F SYST BP GE 130 - 139MM HG: CPT | Performed by: PHYSICIAN ASSISTANT

## 2024-06-20 PROCEDURE — 1157F ADVNC CARE PLAN IN RCRD: CPT | Performed by: PHYSICIAN ASSISTANT

## 2024-06-20 PROCEDURE — 82728 ASSAY OF FERRITIN: CPT | Performed by: PHYSICIAN ASSISTANT

## 2024-06-20 PROCEDURE — 36415 COLL VENOUS BLD VENIPUNCTURE: CPT | Performed by: PHYSICIAN ASSISTANT

## 2024-06-20 PROCEDURE — 83540 ASSAY OF IRON: CPT | Performed by: PHYSICIAN ASSISTANT

## 2024-06-20 PROCEDURE — 1159F MED LIST DOCD IN RCRD: CPT | Performed by: PHYSICIAN ASSISTANT

## 2024-06-20 PROCEDURE — 84075 ASSAY ALKALINE PHOSPHATASE: CPT | Performed by: PHYSICIAN ASSISTANT

## 2024-06-20 PROCEDURE — 1036F TOBACCO NON-USER: CPT | Performed by: PHYSICIAN ASSISTANT

## 2024-06-20 PROCEDURE — 1125F AMNT PAIN NOTED PAIN PRSNT: CPT | Performed by: PHYSICIAN ASSISTANT

## 2024-06-20 RX ORDER — HYDROXYUREA 500 MG/1
500 CAPSULE ORAL
Qty: 90 CAPSULE | Refills: 1 | Status: SHIPPED | OUTPATIENT
Start: 2024-06-21

## 2024-06-20 ASSESSMENT — PAIN SCALES - GENERAL: PAINLEVEL: 5

## 2024-06-21 NOTE — PROGRESS NOTES
I have filled the rx for 100 mg q 6. I am concerned about the increasing usage. We may need to consider a pain eval if her surgery is cancelled.     [facility] Patient Visit Information:   Visit Type: Follow Up Visit     Cancer History:   Treatment Synopsis:    #1) Anemia from CRI and myeloproliferative disorder; Essential Thrombocytosis (on Anagrelide 1 mg three times a day for many years).    #2) breast cancer; 1) s/p mastectomy in 1991 s/p chemotherapy and tamoxifen. contralateral breast cancer in 2009 treated with mastectomy and arimidex for 5 years.    #3) CRI.       History of Present Illness:   Patient presents for follow up. Reports fatigue but is otherwise doing well.  Continues on Hydrea 500 mg MWF. Tolerating well. No new complaints.    Review of Systems:    All of the systems have been reviewed and are negative for complaints except what is stated in the HPI and/or past medical history.    Allergies and Intolerances:  Allergies   Allergen Reactions    Bee Pollen Unknown    House Dust Unknown    House Dust Mite Unknown    Adhesive Tape-Silicones Rash     Outpatient Medication Profile:  Current Outpatient Medications on File Prior to Visit   Medication Sig Dispense Refill    acetaminophen (Tylenol) 325 mg tablet Take 2 tablets (650 mg) by mouth every 8 hours if needed for mild pain (1 - 3), headaches or fever (temp greater than 38.0 C). Max 2000mg daily      amitriptyline (Elavil) 10 mg tablet Take 1 tablet (10 mg) by mouth once daily.      amLODIPine (Norvasc) 5 mg tablet Take 1 tablet (5 mg) by mouth once daily.      atorvastatin (Lipitor) 20 mg tablet TAKE 1 TABLET BY MOUTH DAILY AT BEDTIME 90 tablet 1    B complex-vitamin C-folic acid (Nephrocaps) 1 mg capsule Take 1 capsule by mouth once daily.      Bacillus coagulans/inulin (PROBIOTIC FORMULA, INULIN, ORAL) Take 1 capsule by mouth once daily.      calcitriol (Rocaltrol) 0.25 mcg capsule Take 1 capsule (0.25 mcg) by mouth once a day on Monday, Wednesday, and Friday.      carvedilol (Coreg) 25 mg tablet Take 1 tablet (25 mg) by mouth 2 times daily (morning and late afternoon).      cholecalciferol (Vitamin  D-3) 50 mcg (2,000 unit) capsule Take 1 capsule (50 mcg) by mouth early in the morning..      epoetin palomo (Procrit) 2,000 unit/mL injection Inject 1 mL (2,000 Units) under the skin once a day on Monday, Wednesday, and Friday.      guaiFENesin (Mucinex) 600 mg 12 hr tablet Take 1 tablet (600 mg) by mouth every 12 hours if needed for cough or congestion.      loratadine (Claritin) 10 mg tablet Take 1 tablet (10 mg) by mouth once daily as needed for allergies. In the morning      losartan (Cozaar) 25 mg tablet Take 4 tablets (100 mg) by mouth once daily.      meclizine (Antivert) 25 mg tablet Take 1 tablet (25 mg) by mouth once daily as needed for dizziness.      omeprazole (PriLOSEC) 20 mg DR capsule Take 1 capsule (20 mg) by mouth once daily as needed.      oxygen (O2) gas therapy Oxygen   Refills: 0       Active      sennosides-docusate sodium (Senna-S) 8.6-50 mg tablet Take 1 tablet by mouth once daily at bedtime.      sodium chloride (Ocean) 0.65 % nasal spray Administer 2 sprays into each nostril every 1 hour if needed for congestion.      [DISCONTINUED] hydroxyurea (Hydrea) 500 mg capsule Take 1 capsule (500 mg total) by mouth once a day on Monday, Wednesday, and Friday.  M, W, F       No current facility-administered medications on file prior to visit.     Performance:   ECOG Performance Status: 0 Fully Active         1/23/2024     8:06 AM 1/23/2024    10:29 AM 1/23/2024     1:33 PM 1/30/2024    11:04 AM 2/13/2024     9:58 AM 5/14/2024    10:24 AM 6/20/2024     2:26 PM   Vitals   Systolic 174 176 159 128 132 114 135   Diastolic 80 77 65 58 65 48 70   Heart Rate 89 78 80 85 64 76 93   Temp  36.6 °C (97.9 °F) 36.6 °C (97.9 °F)    36.2 °C (97.2 °F)   Resp  18 16 16   18   Weight (lb)    164 161 161 160   BMI    32.03 kg/m2 31.44 kg/m2 31.44 kg/m2 31.25 kg/m2   BSA (m2)    1.77 m2 1.76 m2 1.76 m2 1.75 m2   Visit Report    Report Report Report Report     Physical Exam:   Constitutional: alert, awake and oriented,  "not in acute distress   HEENT: moist mucous membranes, normal nose   Neck: supple, no lymphadenopathy   EYES: PERRL, EOM intact, conjunctiva normal  Skin: no jaundice, rash or erythema  Neurological: AAOx3, no gross focal deficit   Psychiatric: normal mood and behavior     Labs:  Lab Results   Component Value Date    WBC 8.1 06/20/2024    NEUTROABS 4.94 06/20/2024    IGABSOL 0.06 06/20/2024    LYMPHSABS 1.62 06/20/2024    MONOSABS 1.13 (H) 06/20/2024    EOSABS 0.25 06/20/2024    BASOSABS 0.06 06/20/2024    RBC 2.90 (L) 06/20/2024     (H) 06/20/2024    MCHC 32.1 06/20/2024    HGB 9.8 (L) 06/20/2024    HCT 30.5 (L) 06/20/2024     06/20/2024     No results found for: \"RETICCTPCT\"   Lab Results   Component Value Date    CREATININE 5.87 (H) 06/20/2024    BUN 42 (H) 06/20/2024    EGFR 7 (L) 06/20/2024     06/20/2024    K 4.4 06/20/2024    CL 93 (L) 06/20/2024    CO2 32 06/20/2024      Lab Results   Component Value Date    ALT 19 06/20/2024    AST 20 06/20/2024     (H) 12/04/2023    ALKPHOS 207 (H) 06/20/2024    BILITOT 0.3 06/20/2024      Lab Results   Component Value Date    TSH 2.27 02/10/2023     Lab Results   Component Value Date    TSH 2.27 02/10/2023     Lab Results   Component Value Date    IRON 107 06/20/2024    TIBC 228 (L) 06/20/2024    FERRITIN 1,272 (H) 06/20/2024      Lab Results   Component Value Date    [id number] 874 12/20/2022      Lab Results   Component Value Date    FOLATE 17.1 11/02/2017     Lab Results   Component Value Date    SEDRATE 63 (H) 10/12/2021      Lab Results   Component Value Date    CRP 1.67 (A) 10/12/2021      No results found for: \"MEHRDAD\"  Lab Results   Component Value Date     (H) 12/20/2022     Lab Results   Component Value Date    HAPTOGLOBIN 67 01/16/2018     Lab Results   Component Value Date    SPEP NORMAL 12/20/2022     Assessment:    71 yo with history of myeloproliferative disease which appears from the records to be mostly like essential " thrombocytosis for which she has been on anagrelide for many years with good control of her platelets.     Currently on Hydrea 500 mg MWF due to recall of Anagrelide 0.5 mg. Platelets at goal.     Anemia from CRI/ESRD, FTN > 100. Getting Procrit PRN at dialysis; goal is HgB 10-11    Follow up pulmonary and cardiology.     Ferritin >1000; HFE c/w H63D heterozygous. Discussed limiting the amount of iron she receives at dialysis.      Continue to monitor CBC and Ferritin/iron studies every 6 months    RTC in 6 months    Patient verbalized understanding, and all her questions were answered to her satisfaction    30 min spent with patient greater than 50 % of which was spent in consultation and coordination of care.

## 2024-09-03 ENCOUNTER — TELEPHONE (OUTPATIENT)
Dept: HEMATOLOGY/ONCOLOGY | Facility: CLINIC | Age: 79
End: 2024-09-03
Payer: MEDICARE

## 2024-09-03 DIAGNOSIS — D47.3 ESSENTIAL THROMBOCYTHEMIA (MULTI): ICD-10-CM

## 2024-09-03 RX ORDER — HYDROXYUREA 500 MG/1
500 CAPSULE ORAL
Qty: 90 CAPSULE | Refills: 1 | Status: SHIPPED | OUTPATIENT
Start: 2024-09-04

## 2024-09-03 NOTE — TELEPHONE ENCOUNTER
Last appt with ZHANE Zuñiga 6/20/2024, next appt with ZHANE Zuñiga 12/19/2024  Requesting hydroxyurea refill 500 mg once a day Monday, Wednesday, and Friday  No refills left  Uses [facility] in [place]  Requesting a 90 day supply   Only has 1 week left

## 2024-10-28 ENCOUNTER — APPOINTMENT (OUTPATIENT)
Dept: PULMONOLOGY | Facility: CLINIC | Age: 79
End: 2024-10-28
Payer: MEDICARE

## 2024-10-29 ENCOUNTER — OFFICE VISIT (OUTPATIENT)
Dept: PULMONOLOGY | Facility: CLINIC | Age: 79
End: 2024-10-29
Payer: MEDICARE

## 2024-10-29 VITALS
SYSTOLIC BLOOD PRESSURE: 118 MMHG | DIASTOLIC BLOOD PRESSURE: 67 MMHG | RESPIRATION RATE: 16 BRPM | BODY MASS INDEX: 31.87 KG/M2 | WEIGHT: 163.2 LBS | HEART RATE: 75 BPM | OXYGEN SATURATION: 95 %

## 2024-10-29 DIAGNOSIS — G47.33 OSA ON CPAP: Primary | ICD-10-CM

## 2024-10-29 DIAGNOSIS — J96.11 CHRONIC RESPIRATORY FAILURE WITH HYPOXIA (MULTI): ICD-10-CM

## 2024-10-29 PROCEDURE — 3074F SYST BP LT 130 MM HG: CPT | Performed by: INTERNAL MEDICINE

## 2024-10-29 PROCEDURE — 1036F TOBACCO NON-USER: CPT | Performed by: INTERNAL MEDICINE

## 2024-10-29 PROCEDURE — 99214 OFFICE O/P EST MOD 30 MIN: CPT | Performed by: INTERNAL MEDICINE

## 2024-10-29 PROCEDURE — 1157F ADVNC CARE PLAN IN RCRD: CPT | Performed by: INTERNAL MEDICINE

## 2024-10-29 PROCEDURE — 1125F AMNT PAIN NOTED PAIN PRSNT: CPT | Performed by: INTERNAL MEDICINE

## 2024-10-29 PROCEDURE — 3078F DIAST BP <80 MM HG: CPT | Performed by: INTERNAL MEDICINE

## 2024-10-29 PROCEDURE — 1159F MED LIST DOCD IN RCRD: CPT | Performed by: INTERNAL MEDICINE

## 2024-10-29 ASSESSMENT — PATIENT HEALTH QUESTIONNAIRE - PHQ9
2. FEELING DOWN, DEPRESSED OR HOPELESS: NOT AT ALL
1. LITTLE INTEREST OR PLEASURE IN DOING THINGS: NOT AT ALL
SUM OF ALL RESPONSES TO PHQ9 QUESTIONS 1 AND 2: 0

## 2024-10-29 ASSESSMENT — PAIN SCALES - GENERAL: PAINLEVEL_OUTOF10: 5

## 2024-10-30 ENCOUNTER — APPOINTMENT (OUTPATIENT)
Dept: RADIOLOGY | Facility: HOSPITAL | Age: 79
End: 2024-10-30
Payer: MEDICARE

## 2024-10-30 ENCOUNTER — APPOINTMENT (OUTPATIENT)
Dept: CARDIOLOGY | Facility: HOSPITAL | Age: 79
End: 2024-10-30
Payer: MEDICARE

## 2024-10-30 ENCOUNTER — HOSPITAL ENCOUNTER (OUTPATIENT)
Facility: HOSPITAL | Age: 79
Setting detail: OBSERVATION
Discharge: NURSING, REHAB OR LONG-TERM CARE | End: 2024-10-31
Attending: STUDENT IN AN ORGANIZED HEALTH CARE EDUCATION/TRAINING PROGRAM | Admitting: INTERNAL MEDICINE
Payer: MEDICARE

## 2024-10-30 DIAGNOSIS — R55 POSTURAL DIZZINESS WITH PRESYNCOPE: ICD-10-CM

## 2024-10-30 DIAGNOSIS — I66.9 STENOSIS OF CEREBRAL ARTERY: ICD-10-CM

## 2024-10-30 DIAGNOSIS — E83.39 HYPERPHOSPHATEMIA: ICD-10-CM

## 2024-10-30 DIAGNOSIS — R42 POSTURAL DIZZINESS WITH PRESYNCOPE: ICD-10-CM

## 2024-10-30 DIAGNOSIS — R11.2 NAUSEA AND VOMITING, UNSPECIFIED VOMITING TYPE: ICD-10-CM

## 2024-10-30 DIAGNOSIS — R42 DIZZINESS: Primary | ICD-10-CM

## 2024-10-30 PROBLEM — F32.4 DEPRESSION, MAJOR, IN PARTIAL REMISSION (CMS-HCC): Status: RESOLVED | Noted: 2023-08-19 | Resolved: 2024-10-30

## 2024-10-30 PROBLEM — R11.14 BILIOUS VOMITING WITH NAUSEA: Status: ACTIVE | Noted: 2024-10-30

## 2024-10-30 LAB
ALBUMIN SERPL BCP-MCNC: 4 G/DL (ref 3.4–5)
ALP SERPL-CCNC: 198 U/L (ref 33–136)
ALT SERPL W P-5'-P-CCNC: 25 U/L (ref 7–45)
ANION GAP SERPL CALC-SCNC: 21 MMOL/L (ref 10–20)
AST SERPL W P-5'-P-CCNC: 19 U/L (ref 9–39)
ATRIAL RATE: 81 BPM
BASOPHILS # BLD AUTO: 0.04 X10*3/UL (ref 0–0.1)
BASOPHILS NFR BLD AUTO: 0.5 %
BILIRUB SERPL-MCNC: 0.3 MG/DL (ref 0–1.2)
BNP SERPL-MCNC: 191 PG/ML (ref 0–99)
BUN SERPL-MCNC: 67 MG/DL (ref 6–23)
CALCIUM SERPL-MCNC: 9.5 MG/DL (ref 8.6–10.3)
CARDIAC TROPONIN I PNL SERPL HS: 21 NG/L (ref 0–13)
CARDIAC TROPONIN I PNL SERPL HS: 22 NG/L (ref 0–13)
CHLORIDE SERPL-SCNC: 96 MMOL/L (ref 98–107)
CHOLEST SERPL-MCNC: 148 MG/DL (ref 0–199)
CHOLESTEROL/HDL RATIO: 2.3
CO2 SERPL-SCNC: 24 MMOL/L (ref 21–32)
CREAT SERPL-MCNC: 8.05 MG/DL (ref 0.5–1.05)
EGFRCR SERPLBLD CKD-EPI 2021: 5 ML/MIN/1.73M*2
EOSINOPHIL # BLD AUTO: 0.25 X10*3/UL (ref 0–0.4)
EOSINOPHIL NFR BLD AUTO: 3.1 %
ERYTHROCYTE [DISTWIDTH] IN BLOOD BY AUTOMATED COUNT: 15.1 % (ref 11.5–14.5)
FLUAV RNA RESP QL NAA+PROBE: NOT DETECTED
FLUBV RNA RESP QL NAA+PROBE: NOT DETECTED
GLUCOSE BLD MANUAL STRIP-MCNC: 142 MG/DL (ref 74–99)
GLUCOSE BLD MANUAL STRIP-MCNC: 95 MG/DL (ref 74–99)
GLUCOSE SERPL-MCNC: 118 MG/DL (ref 74–99)
HCT VFR BLD AUTO: 36.7 % (ref 36–46)
HDLC SERPL-MCNC: 64.1 MG/DL
HGB BLD-MCNC: 11.9 G/DL (ref 12–16)
IMM GRANULOCYTES # BLD AUTO: 0.03 X10*3/UL (ref 0–0.5)
IMM GRANULOCYTES NFR BLD AUTO: 0.4 % (ref 0–0.9)
LDLC SERPL CALC-MCNC: 66 MG/DL
LIPASE SERPL-CCNC: 24 U/L (ref 9–82)
LYMPHOCYTES # BLD AUTO: 1.41 X10*3/UL (ref 0.8–3)
LYMPHOCYTES NFR BLD AUTO: 17.3 %
MAGNESIUM SERPL-MCNC: 2.2 MG/DL (ref 1.6–2.4)
MCH RBC QN AUTO: 32.5 PG (ref 26–34)
MCHC RBC AUTO-ENTMCNC: 32.4 G/DL (ref 32–36)
MCV RBC AUTO: 100 FL (ref 80–100)
MONOCYTES # BLD AUTO: 0.77 X10*3/UL (ref 0.05–0.8)
MONOCYTES NFR BLD AUTO: 9.4 %
NEUTROPHILS # BLD AUTO: 5.65 X10*3/UL (ref 1.6–5.5)
NEUTROPHILS NFR BLD AUTO: 69.3 %
NON HDL CHOLESTEROL: 84 MG/DL (ref 0–149)
NRBC BLD-RTO: 0 /100 WBCS (ref 0–0)
P AXIS: 54 DEGREES
P OFFSET: 182 MS
P ONSET: 127 MS
PLATELET # BLD AUTO: 434 X10*3/UL (ref 150–450)
POTASSIUM SERPL-SCNC: 4.3 MMOL/L (ref 3.5–5.3)
PR INTERVAL: 166 MS
PROT SERPL-MCNC: 7.1 G/DL (ref 6.4–8.2)
Q ONSET: 210 MS
QRS COUNT: 13 BEATS
QRS DURATION: 102 MS
QT INTERVAL: 412 MS
QTC CALCULATION(BAZETT): 478 MS
QTC FREDERICIA: 455 MS
R AXIS: 17 DEGREES
RBC # BLD AUTO: 3.66 X10*6/UL (ref 4–5.2)
SARS-COV-2 RNA RESP QL NAA+PROBE: NOT DETECTED
SODIUM SERPL-SCNC: 137 MMOL/L (ref 136–145)
T AXIS: 47 DEGREES
T OFFSET: 416 MS
TRIGL SERPL-MCNC: 88 MG/DL (ref 0–149)
VENTRICULAR RATE: 81 BPM
VLDL: 18 MG/DL (ref 0–40)
WBC # BLD AUTO: 8.2 X10*3/UL (ref 4.4–11.3)

## 2024-10-30 PROCEDURE — 74176 CT ABD & PELVIS W/O CONTRAST: CPT | Performed by: RADIOLOGY

## 2024-10-30 PROCEDURE — 2500000004 HC RX 250 GENERAL PHARMACY W/ HCPCS (ALT 636 FOR OP/ED)

## 2024-10-30 PROCEDURE — 70450 CT HEAD/BRAIN W/O DYE: CPT

## 2024-10-30 PROCEDURE — 70547 MR ANGIOGRAPHY NECK W/O DYE: CPT

## 2024-10-30 PROCEDURE — 2500000002 HC RX 250 W HCPCS SELF ADMINISTERED DRUGS (ALT 637 FOR MEDICARE OP, ALT 636 FOR OP/ED)

## 2024-10-30 PROCEDURE — G0378 HOSPITAL OBSERVATION PER HR: HCPCS

## 2024-10-30 PROCEDURE — 74176 CT ABD & PELVIS W/O CONTRAST: CPT

## 2024-10-30 PROCEDURE — 93005 ELECTROCARDIOGRAM TRACING: CPT

## 2024-10-30 PROCEDURE — 99223 1ST HOSP IP/OBS HIGH 75: CPT | Performed by: INTERNAL MEDICINE

## 2024-10-30 PROCEDURE — 2500000004 HC RX 250 GENERAL PHARMACY W/ HCPCS (ALT 636 FOR OP/ED): Performed by: STUDENT IN AN ORGANIZED HEALTH CARE EDUCATION/TRAINING PROGRAM

## 2024-10-30 PROCEDURE — 83690 ASSAY OF LIPASE: CPT

## 2024-10-30 PROCEDURE — 2500000001 HC RX 250 WO HCPCS SELF ADMINISTERED DRUGS (ALT 637 FOR MEDICARE OP): Performed by: PHYSICIAN ASSISTANT

## 2024-10-30 PROCEDURE — 2500000005 HC RX 250 GENERAL PHARMACY W/O HCPCS: Performed by: PHYSICIAN ASSISTANT

## 2024-10-30 PROCEDURE — 87636 SARSCOV2 & INF A&B AMP PRB: CPT

## 2024-10-30 PROCEDURE — 84484 ASSAY OF TROPONIN QUANT: CPT

## 2024-10-30 PROCEDURE — 36415 COLL VENOUS BLD VENIPUNCTURE: CPT

## 2024-10-30 PROCEDURE — 83880 ASSAY OF NATRIURETIC PEPTIDE: CPT

## 2024-10-30 PROCEDURE — 83735 ASSAY OF MAGNESIUM: CPT

## 2024-10-30 PROCEDURE — 96376 TX/PRO/DX INJ SAME DRUG ADON: CPT

## 2024-10-30 PROCEDURE — 99285 EMERGENCY DEPT VISIT HI MDM: CPT | Mod: 25

## 2024-10-30 PROCEDURE — 94760 N-INVAS EAR/PLS OXIMETRY 1: CPT

## 2024-10-30 PROCEDURE — 82947 ASSAY GLUCOSE BLOOD QUANT: CPT

## 2024-10-30 PROCEDURE — 70544 MR ANGIOGRAPHY HEAD W/O DYE: CPT

## 2024-10-30 PROCEDURE — 96372 THER/PROPH/DIAG INJ SC/IM: CPT | Performed by: PHYSICIAN ASSISTANT

## 2024-10-30 PROCEDURE — 2500000004 HC RX 250 GENERAL PHARMACY W/ HCPCS (ALT 636 FOR OP/ED): Performed by: PHYSICIAN ASSISTANT

## 2024-10-30 PROCEDURE — 96374 THER/PROPH/DIAG INJ IV PUSH: CPT

## 2024-10-30 PROCEDURE — 85025 COMPLETE CBC W/AUTO DIFF WBC: CPT

## 2024-10-30 PROCEDURE — 84075 ASSAY ALKALINE PHOSPHATASE: CPT

## 2024-10-30 PROCEDURE — 71250 CT THORAX DX C-: CPT | Performed by: RADIOLOGY

## 2024-10-30 PROCEDURE — 96375 TX/PRO/DX INJ NEW DRUG ADDON: CPT

## 2024-10-30 PROCEDURE — 96361 HYDRATE IV INFUSION ADD-ON: CPT

## 2024-10-30 PROCEDURE — 80061 LIPID PANEL: CPT | Performed by: PHYSICIAN ASSISTANT

## 2024-10-30 PROCEDURE — 2500000002 HC RX 250 W HCPCS SELF ADMINISTERED DRUGS (ALT 637 FOR MEDICARE OP, ALT 636 FOR OP/ED): Performed by: PHYSICIAN ASSISTANT

## 2024-10-30 PROCEDURE — 70450 CT HEAD/BRAIN W/O DYE: CPT | Performed by: RADIOLOGY

## 2024-10-30 PROCEDURE — 83036 HEMOGLOBIN GLYCOSYLATED A1C: CPT | Mod: GEALAB | Performed by: PHYSICIAN ASSISTANT

## 2024-10-30 PROCEDURE — 70551 MRI BRAIN STEM W/O DYE: CPT

## 2024-10-30 RX ORDER — PANTOPRAZOLE SODIUM 40 MG/1
40 TABLET, DELAYED RELEASE ORAL
Status: DISCONTINUED | OUTPATIENT
Start: 2024-10-31 | End: 2024-10-31 | Stop reason: HOSPADM

## 2024-10-30 RX ORDER — CLOPIDOGREL BISULFATE 75 MG/1
75 TABLET ORAL DAILY
Status: DISCONTINUED | OUTPATIENT
Start: 2024-10-30 | End: 2024-10-30

## 2024-10-30 RX ORDER — CLOPIDOGREL BISULFATE 75 MG/1
75 TABLET ORAL DAILY
Status: DISCONTINUED | OUTPATIENT
Start: 2024-10-30 | End: 2024-10-31

## 2024-10-30 RX ORDER — ATORVASTATIN CALCIUM 80 MG/1
80 TABLET, FILM COATED ORAL NIGHTLY
Status: DISCONTINUED | OUTPATIENT
Start: 2024-10-30 | End: 2024-10-31 | Stop reason: HOSPADM

## 2024-10-30 RX ORDER — ATORVASTATIN CALCIUM 80 MG/1
80 TABLET, FILM COATED ORAL DAILY
Status: DISCONTINUED | OUTPATIENT
Start: 2024-10-30 | End: 2024-10-30

## 2024-10-30 RX ORDER — ACETAMINOPHEN 500 MG
5 TABLET ORAL NIGHTLY PRN
Status: DISCONTINUED | OUTPATIENT
Start: 2024-10-30 | End: 2024-10-31 | Stop reason: HOSPADM

## 2024-10-30 RX ORDER — HEPARIN SODIUM 5000 [USP'U]/ML
5000 INJECTION, SOLUTION INTRAVENOUS; SUBCUTANEOUS EVERY 8 HOURS
Status: DISCONTINUED | OUTPATIENT
Start: 2024-10-30 | End: 2024-10-31 | Stop reason: HOSPADM

## 2024-10-30 RX ORDER — ONDANSETRON HYDROCHLORIDE 2 MG/ML
4 INJECTION, SOLUTION INTRAVENOUS ONCE
Status: COMPLETED | OUTPATIENT
Start: 2024-10-30 | End: 2024-10-30

## 2024-10-30 RX ORDER — ONDANSETRON HYDROCHLORIDE 2 MG/ML
4 INJECTION, SOLUTION INTRAVENOUS EVERY 6 HOURS PRN
Status: DISCONTINUED | OUTPATIENT
Start: 2024-10-30 | End: 2024-10-31 | Stop reason: HOSPADM

## 2024-10-30 RX ORDER — PANTOPRAZOLE SODIUM 40 MG/10ML
40 INJECTION, POWDER, LYOPHILIZED, FOR SOLUTION INTRAVENOUS
Status: DISCONTINUED | OUTPATIENT
Start: 2024-10-31 | End: 2024-10-31 | Stop reason: HOSPADM

## 2024-10-30 RX ORDER — ACETAMINOPHEN 325 MG/1
650 TABLET ORAL EVERY 6 HOURS PRN
Status: DISCONTINUED | OUTPATIENT
Start: 2024-10-30 | End: 2024-10-31 | Stop reason: HOSPADM

## 2024-10-30 RX ORDER — HYDRALAZINE HYDROCHLORIDE 20 MG/ML
10 INJECTION INTRAMUSCULAR; INTRAVENOUS
Status: DISCONTINUED | OUTPATIENT
Start: 2024-10-30 | End: 2024-10-31 | Stop reason: HOSPADM

## 2024-10-30 RX ORDER — MECLIZINE HYDROCHLORIDE 25 MG/1
12.5 TABLET ORAL 3 TIMES DAILY PRN
Status: DISCONTINUED | OUTPATIENT
Start: 2024-10-30 | End: 2024-10-31 | Stop reason: HOSPADM

## 2024-10-30 RX ORDER — POLYETHYLENE GLYCOL 3350 17 G/17G
17 POWDER, FOR SOLUTION ORAL DAILY
Status: DISCONTINUED | OUTPATIENT
Start: 2024-10-30 | End: 2024-10-31 | Stop reason: HOSPADM

## 2024-10-30 RX ORDER — HYDRALAZINE HYDROCHLORIDE 25 MG/1
25 TABLET, FILM COATED ORAL EVERY 6 HOURS PRN
Status: DISCONTINUED | OUTPATIENT
Start: 2024-11-01 | End: 2024-10-31 | Stop reason: HOSPADM

## 2024-10-30 RX ORDER — LABETALOL HYDROCHLORIDE 5 MG/ML
10 INJECTION, SOLUTION INTRAVENOUS EVERY 10 MIN PRN
Status: DISCONTINUED | OUTPATIENT
Start: 2024-10-30 | End: 2024-10-31 | Stop reason: HOSPADM

## 2024-10-30 RX ORDER — MECLIZINE HYDROCHLORIDE 25 MG/1
25 TABLET ORAL ONCE
Status: COMPLETED | OUTPATIENT
Start: 2024-10-30 | End: 2024-10-30

## 2024-10-30 SDOH — SOCIAL STABILITY: SOCIAL INSECURITY
WITHIN THE LAST YEAR, HAVE YOU BEEN KICKED, HIT, SLAPPED, OR OTHERWISE PHYSICALLY HURT BY YOUR PARTNER OR EX-PARTNER?: NO

## 2024-10-30 SDOH — SOCIAL STABILITY: SOCIAL INSECURITY: WITHIN THE LAST YEAR, HAVE YOU BEEN HUMILIATED OR EMOTIONALLY ABUSED IN OTHER WAYS BY YOUR PARTNER OR EX-PARTNER?: NO

## 2024-10-30 SDOH — ECONOMIC STABILITY: FOOD INSECURITY: WITHIN THE PAST 12 MONTHS, THE FOOD YOU BOUGHT JUST DIDN'T LAST AND YOU DIDN'T HAVE MONEY TO GET MORE.: NEVER TRUE

## 2024-10-30 SDOH — SOCIAL STABILITY: SOCIAL INSECURITY: HAVE YOU HAD THOUGHTS OF HARMING ANYONE ELSE?: NO

## 2024-10-30 SDOH — SOCIAL STABILITY: SOCIAL INSECURITY: ARE THERE ANY APPARENT SIGNS OF INJURIES/BEHAVIORS THAT COULD BE RELATED TO ABUSE/NEGLECT?: NO

## 2024-10-30 SDOH — ECONOMIC STABILITY: INCOME INSECURITY: IN THE PAST 12 MONTHS HAS THE ELECTRIC, GAS, OIL, OR WATER COMPANY THREATENED TO SHUT OFF SERVICES IN YOUR HOME?: NO

## 2024-10-30 SDOH — ECONOMIC STABILITY: FOOD INSECURITY: WITHIN THE PAST 12 MONTHS, YOU WORRIED THAT YOUR FOOD WOULD RUN OUT BEFORE YOU GOT THE MONEY TO BUY MORE.: NEVER TRUE

## 2024-10-30 SDOH — SOCIAL STABILITY: SOCIAL INSECURITY: ARE YOU OR HAVE YOU BEEN THREATENED OR ABUSED PHYSICALLY, EMOTIONALLY, OR SEXUALLY BY ANYONE?: NO

## 2024-10-30 SDOH — SOCIAL STABILITY: SOCIAL INSECURITY: ABUSE: ADULT

## 2024-10-30 SDOH — SOCIAL STABILITY: SOCIAL INSECURITY: HAVE YOU HAD ANY THOUGHTS OF HARMING ANYONE ELSE?: NO

## 2024-10-30 SDOH — SOCIAL STABILITY: SOCIAL INSECURITY: WITHIN THE LAST YEAR, HAVE YOU BEEN AFRAID OF YOUR PARTNER OR EX-PARTNER?: NO

## 2024-10-30 SDOH — SOCIAL STABILITY: SOCIAL INSECURITY
WITHIN THE LAST YEAR, HAVE YOU BEEN RAPED OR FORCED TO HAVE ANY KIND OF SEXUAL ACTIVITY BY YOUR PARTNER OR EX-PARTNER?: NO

## 2024-10-30 SDOH — SOCIAL STABILITY: SOCIAL INSECURITY: DO YOU FEEL UNSAFE GOING BACK TO THE PLACE WHERE YOU ARE LIVING?: NO

## 2024-10-30 SDOH — SOCIAL STABILITY: SOCIAL INSECURITY: DOES ANYONE TRY TO KEEP YOU FROM HAVING/CONTACTING OTHER FRIENDS OR DOING THINGS OUTSIDE YOUR HOME?: NO

## 2024-10-30 SDOH — SOCIAL STABILITY: SOCIAL INSECURITY: HAS ANYONE EVER THREATENED TO HURT YOUR FAMILY OR YOUR PETS?: NO

## 2024-10-30 SDOH — SOCIAL STABILITY: SOCIAL INSECURITY: WERE YOU ABLE TO COMPLETE ALL THE BEHAVIORAL HEALTH SCREENINGS?: YES

## 2024-10-30 SDOH — SOCIAL STABILITY: SOCIAL INSECURITY: DO YOU FEEL ANYONE HAS EXPLOITED OR TAKEN ADVANTAGE OF YOU FINANCIALLY OR OF YOUR PERSONAL PROPERTY?: NO

## 2024-10-30 ASSESSMENT — ACTIVITIES OF DAILY LIVING (ADL)
TOILETING: NEEDS ASSISTANCE
HEARING - LEFT EAR: FUNCTIONAL
PATIENT'S MEMORY ADEQUATE TO SAFELY COMPLETE DAILY ACTIVITIES?: YES
BATHING: NEEDS ASSISTANCE
GROOMING: NEEDS ASSISTANCE
ASSISTIVE_DEVICE: WALKER
LACK_OF_TRANSPORTATION: NO
LACK_OF_TRANSPORTATION: NO
FEEDING YOURSELF: NEEDS ASSISTANCE
WALKS IN HOME: NEEDS ASSISTANCE
DRESSING YOURSELF: NEEDS ASSISTANCE
JUDGMENT_ADEQUATE_SAFELY_COMPLETE_DAILY_ACTIVITIES: YES
ADEQUATE_TO_COMPLETE_ADL: YES
HEARING - RIGHT EAR: FUNCTIONAL

## 2024-10-30 ASSESSMENT — PATIENT HEALTH QUESTIONNAIRE - PHQ9
SUM OF ALL RESPONSES TO PHQ9 QUESTIONS 1 & 2: 0
2. FEELING DOWN, DEPRESSED OR HOPELESS: NOT AT ALL
1. LITTLE INTEREST OR PLEASURE IN DOING THINGS: NOT AT ALL

## 2024-10-30 ASSESSMENT — PAIN - FUNCTIONAL ASSESSMENT
PAIN_FUNCTIONAL_ASSESSMENT: 0-10

## 2024-10-30 ASSESSMENT — ENCOUNTER SYMPTOMS
EYE REDNESS: 0
NAUSEA: 1
LIGHT-HEADEDNESS: 0
DYSURIA: 0
ABDOMINAL PAIN: 0
FLANK PAIN: 0
COUGH: 0
UNEXPECTED WEIGHT CHANGE: 0
HEMATURIA: 0
TROUBLE SWALLOWING: 0
CHEST TIGHTNESS: 0
WEAKNESS: 1
DIARRHEA: 0
CONFUSION: 0
HALLUCINATIONS: 0
FEVER: 0
SORE THROAT: 0
SLEEP DISTURBANCE: 0
VOMITING: 1
DECREASED CONCENTRATION: 0
SEIZURES: 0
CONSTIPATION: 0
NUMBNESS: 0
PALPITATIONS: 0
DIZZINESS: 1
SHORTNESS OF BREATH: 0
DIAPHORESIS: 0
DIFFICULTY URINATING: 0
PHOTOPHOBIA: 0
RHINORRHEA: 0
MYALGIAS: 0
WHEEZING: 0
FREQUENCY: 0
WOUND: 0
FACIAL ASYMMETRY: 0
HEADACHES: 0

## 2024-10-30 ASSESSMENT — COGNITIVE AND FUNCTIONAL STATUS - GENERAL
CLIMB 3 TO 5 STEPS WITH RAILING: A LITTLE
DAILY ACTIVITIY SCORE: 18
WALKING IN HOSPITAL ROOM: A LITTLE
MOBILITY SCORE: 18
TOILETING: A LITTLE
PATIENT BASELINE BEDBOUND: NO
HELP NEEDED FOR BATHING: A LITTLE
EATING MEALS: A LITTLE
DRESSING REGULAR UPPER BODY CLOTHING: A LITTLE
MOVING FROM LYING ON BACK TO SITTING ON SIDE OF FLAT BED WITH BEDRAILS: A LITTLE
PERSONAL GROOMING: A LITTLE
MOVING TO AND FROM BED TO CHAIR: A LITTLE
TURNING FROM BACK TO SIDE WHILE IN FLAT BAD: A LITTLE
DRESSING REGULAR LOWER BODY CLOTHING: A LITTLE
STANDING UP FROM CHAIR USING ARMS: A LITTLE

## 2024-10-30 ASSESSMENT — PAIN SCALES - GENERAL
PAINLEVEL_OUTOF10: 0 - NO PAIN
PAINLEVEL_OUTOF10: 3
PAINLEVEL_OUTOF10: 4
PAINLEVEL_OUTOF10: 0 - NO PAIN

## 2024-10-30 ASSESSMENT — PAIN DESCRIPTION - DESCRIPTORS: DESCRIPTORS: ACHING

## 2024-10-30 ASSESSMENT — LIFESTYLE VARIABLES
HOW OFTEN DO YOU HAVE 6 OR MORE DRINKS ON ONE OCCASION: NEVER
HOW OFTEN DO YOU HAVE A DRINK CONTAINING ALCOHOL: NEVER
EVER HAD A DRINK FIRST THING IN THE MORNING TO STEADY YOUR NERVES TO GET RID OF A HANGOVER: NO
SKIP TO QUESTIONS 9-10: 1
TOTAL SCORE: 0
AUDIT-C TOTAL SCORE: 0
HOW MANY STANDARD DRINKS CONTAINING ALCOHOL DO YOU HAVE ON A TYPICAL DAY: PATIENT DOES NOT DRINK
HAVE YOU EVER FELT YOU SHOULD CUT DOWN ON YOUR DRINKING: NO
AUDIT-C TOTAL SCORE: 0
EVER FELT BAD OR GUILTY ABOUT YOUR DRINKING: NO
HAVE PEOPLE ANNOYED YOU BY CRITICIZING YOUR DRINKING: NO

## 2024-10-30 ASSESSMENT — PAIN DESCRIPTION - LOCATION
LOCATION: HEAD
LOCATION: KNEE

## 2024-10-30 ASSESSMENT — PAIN DESCRIPTION - PAIN TYPE: TYPE: ACUTE PAIN

## 2024-10-30 ASSESSMENT — PAIN DESCRIPTION - ORIENTATION: ORIENTATION: LEFT

## 2024-10-31 ENCOUNTER — APPOINTMENT (OUTPATIENT)
Dept: DIALYSIS | Facility: HOSPITAL | Age: 79
End: 2024-10-31
Payer: MEDICARE

## 2024-10-31 VITALS
OXYGEN SATURATION: 99 % | DIASTOLIC BLOOD PRESSURE: 77 MMHG | SYSTOLIC BLOOD PRESSURE: 174 MMHG | RESPIRATION RATE: 16 BRPM | WEIGHT: 167 LBS | HEART RATE: 81 BPM | TEMPERATURE: 97.7 F | HEIGHT: 60 IN | BODY MASS INDEX: 32.79 KG/M2

## 2024-10-31 LAB
ALBUMIN SERPL BCP-MCNC: 3.4 G/DL (ref 3.4–5)
ANION GAP SERPL CALC-SCNC: 23 MMOL/L (ref 10–20)
BUN SERPL-MCNC: 79 MG/DL (ref 6–23)
CALCIUM SERPL-MCNC: 8.5 MG/DL (ref 8.6–10.3)
CHLORIDE SERPL-SCNC: 97 MMOL/L (ref 98–107)
CO2 SERPL-SCNC: 23 MMOL/L (ref 21–32)
CREAT SERPL-MCNC: 9.78 MG/DL (ref 0.5–1.05)
EGFRCR SERPLBLD CKD-EPI 2021: 4 ML/MIN/1.73M*2
ERYTHROCYTE [DISTWIDTH] IN BLOOD BY AUTOMATED COUNT: 14.8 % (ref 11.5–14.5)
EST. AVERAGE GLUCOSE BLD GHB EST-MCNC: 111 MG/DL
GLUCOSE BLD MANUAL STRIP-MCNC: 80 MG/DL (ref 74–99)
GLUCOSE SERPL-MCNC: 70 MG/DL (ref 74–99)
HBA1C MFR BLD: 5.5 %
HCT VFR BLD AUTO: 32.1 % (ref 36–46)
HGB BLD-MCNC: 10.3 G/DL (ref 12–16)
MCH RBC QN AUTO: 31.9 PG (ref 26–34)
MCHC RBC AUTO-ENTMCNC: 32.1 G/DL (ref 32–36)
MCV RBC AUTO: 99 FL (ref 80–100)
NRBC BLD-RTO: 0 /100 WBCS (ref 0–0)
PHOSPHATE SERPL-MCNC: 7.4 MG/DL (ref 2.5–4.9)
PLATELET # BLD AUTO: 387 X10*3/UL (ref 150–450)
POTASSIUM SERPL-SCNC: 4.7 MMOL/L (ref 3.5–5.3)
RBC # BLD AUTO: 3.23 X10*6/UL (ref 4–5.2)
SODIUM SERPL-SCNC: 138 MMOL/L (ref 136–145)
WBC # BLD AUTO: 7.3 X10*3/UL (ref 4.4–11.3)

## 2024-10-31 PROCEDURE — 96372 THER/PROPH/DIAG INJ SC/IM: CPT | Performed by: INTERNAL MEDICINE

## 2024-10-31 PROCEDURE — 97165 OT EVAL LOW COMPLEX 30 MIN: CPT | Mod: GO

## 2024-10-31 PROCEDURE — 2500000001 HC RX 250 WO HCPCS SELF ADMINISTERED DRUGS (ALT 637 FOR MEDICARE OP): Performed by: PHYSICIAN ASSISTANT

## 2024-10-31 PROCEDURE — 80069 RENAL FUNCTION PANEL: CPT | Performed by: INTERNAL MEDICINE

## 2024-10-31 PROCEDURE — 2500000002 HC RX 250 W HCPCS SELF ADMINISTERED DRUGS (ALT 637 FOR MEDICARE OP, ALT 636 FOR OP/ED): Performed by: PHYSICIAN ASSISTANT

## 2024-10-31 PROCEDURE — 96372 THER/PROPH/DIAG INJ SC/IM: CPT | Performed by: PHYSICIAN ASSISTANT

## 2024-10-31 PROCEDURE — 90937 HEMODIALYSIS REPEATED EVAL: CPT

## 2024-10-31 PROCEDURE — 2500000005 HC RX 250 GENERAL PHARMACY W/O HCPCS: Performed by: INTERNAL MEDICINE

## 2024-10-31 PROCEDURE — 97161 PT EVAL LOW COMPLEX 20 MIN: CPT | Mod: GP

## 2024-10-31 PROCEDURE — 2500000004 HC RX 250 GENERAL PHARMACY W/ HCPCS (ALT 636 FOR OP/ED): Performed by: INTERNAL MEDICINE

## 2024-10-31 PROCEDURE — 94760 N-INVAS EAR/PLS OXIMETRY 1: CPT

## 2024-10-31 PROCEDURE — 36415 COLL VENOUS BLD VENIPUNCTURE: CPT | Performed by: PHYSICIAN ASSISTANT

## 2024-10-31 PROCEDURE — 96376 TX/PRO/DX INJ SAME DRUG ADON: CPT | Mod: 59

## 2024-10-31 PROCEDURE — G0378 HOSPITAL OBSERVATION PER HR: HCPCS

## 2024-10-31 PROCEDURE — 2500000004 HC RX 250 GENERAL PHARMACY W/ HCPCS (ALT 636 FOR OP/ED): Performed by: PHYSICIAN ASSISTANT

## 2024-10-31 PROCEDURE — 2500000001 HC RX 250 WO HCPCS SELF ADMINISTERED DRUGS (ALT 637 FOR MEDICARE OP): Performed by: INTERNAL MEDICINE

## 2024-10-31 PROCEDURE — 82947 ASSAY GLUCOSE BLOOD QUANT: CPT

## 2024-10-31 PROCEDURE — 99222 1ST HOSP IP/OBS MODERATE 55: CPT | Performed by: PSYCHIATRY & NEUROLOGY

## 2024-10-31 PROCEDURE — 85027 COMPLETE CBC AUTOMATED: CPT | Performed by: PHYSICIAN ASSISTANT

## 2024-10-31 PROCEDURE — 99233 SBSQ HOSP IP/OBS HIGH 50: CPT | Performed by: INTERNAL MEDICINE

## 2024-10-31 RX ORDER — SEVELAMER CARBONATE 800 MG/1
800 TABLET, FILM COATED ORAL
Qty: 90 TABLET | Refills: 0 | Status: SHIPPED | OUTPATIENT
Start: 2024-10-31 | End: 2024-11-30

## 2024-10-31 RX ORDER — ONDANSETRON HYDROCHLORIDE 2 MG/ML
4 INJECTION, SOLUTION INTRAVENOUS ONCE
Status: DISCONTINUED | OUTPATIENT
Start: 2024-10-31 | End: 2024-10-31 | Stop reason: HOSPADM

## 2024-10-31 RX ORDER — AMOXICILLIN 250 MG
1 CAPSULE ORAL NIGHTLY
Status: DISCONTINUED | OUTPATIENT
Start: 2024-10-31 | End: 2024-10-31 | Stop reason: HOSPADM

## 2024-10-31 RX ORDER — SEVELAMER CARBONATE 800 MG/1
800 TABLET, FILM COATED ORAL
Status: DISCONTINUED | OUTPATIENT
Start: 2024-10-31 | End: 2024-10-31 | Stop reason: WASHOUT

## 2024-10-31 RX ORDER — ASPIRIN 81 MG/1
81 TABLET ORAL DAILY
Start: 2024-10-31 | End: 2024-11-30

## 2024-10-31 RX ORDER — CARVEDILOL 25 MG/1
25 TABLET ORAL
Status: DISCONTINUED | OUTPATIENT
Start: 2024-10-31 | End: 2024-10-31 | Stop reason: HOSPADM

## 2024-10-31 RX ORDER — LOSARTAN POTASSIUM 50 MG/1
100 TABLET ORAL DAILY
Status: DISCONTINUED | OUTPATIENT
Start: 2024-10-31 | End: 2024-10-31 | Stop reason: HOSPADM

## 2024-10-31 RX ORDER — CETIRIZINE HYDROCHLORIDE 10 MG/1
10 TABLET ORAL DAILY
Status: DISCONTINUED | OUTPATIENT
Start: 2024-10-31 | End: 2024-10-31 | Stop reason: HOSPADM

## 2024-10-31 RX ORDER — CALCITRIOL 0.25 UG/1
0.25 CAPSULE ORAL
Status: DISCONTINUED | OUTPATIENT
Start: 2024-11-01 | End: 2024-10-31 | Stop reason: HOSPADM

## 2024-10-31 RX ORDER — DEXTROSE 50 % IN WATER (D50W) INTRAVENOUS SYRINGE
12.5
Status: DISCONTINUED | OUTPATIENT
Start: 2024-10-31 | End: 2024-10-31 | Stop reason: HOSPADM

## 2024-10-31 RX ORDER — AMITRIPTYLINE HYDROCHLORIDE 10 MG/1
10 TABLET, FILM COATED ORAL DAILY
Status: DISCONTINUED | OUTPATIENT
Start: 2024-10-31 | End: 2024-10-31 | Stop reason: HOSPADM

## 2024-10-31 RX ORDER — MECLIZINE HCL 12.5 MG 12.5 MG/1
12.5 TABLET ORAL 3 TIMES DAILY PRN
Qty: 30 TABLET | Refills: 0 | Status: SHIPPED | OUTPATIENT
Start: 2024-10-31

## 2024-10-31 RX ORDER — AMLODIPINE BESYLATE 5 MG/1
5 TABLET ORAL DAILY
Status: DISCONTINUED | OUTPATIENT
Start: 2024-10-31 | End: 2024-10-31 | Stop reason: HOSPADM

## 2024-10-31 RX ORDER — DEXTROSE 50 % IN WATER (D50W) INTRAVENOUS SYRINGE
25
Status: DISCONTINUED | OUTPATIENT
Start: 2024-10-31 | End: 2024-10-31 | Stop reason: HOSPADM

## 2024-10-31 RX ORDER — SEVELAMER CARBONATE 800 MG/1
800 TABLET, FILM COATED ORAL
Status: DISCONTINUED | OUTPATIENT
Start: 2024-10-31 | End: 2024-10-31 | Stop reason: HOSPADM

## 2024-10-31 RX ORDER — HYDROXYUREA 500 MG/1
500 CAPSULE ORAL
Status: DISCONTINUED | OUTPATIENT
Start: 2024-11-01 | End: 2024-10-31 | Stop reason: HOSPADM

## 2024-10-31 RX ORDER — CHOLECALCIFEROL (VITAMIN D3) 25 MCG
2000 TABLET ORAL DAILY
Status: DISCONTINUED | OUTPATIENT
Start: 2024-10-31 | End: 2024-10-31 | Stop reason: HOSPADM

## 2024-10-31 ASSESSMENT — COGNITIVE AND FUNCTIONAL STATUS - GENERAL
STANDING UP FROM CHAIR USING ARMS: A LITTLE
PERSONAL GROOMING: A LITTLE
WALKING IN HOSPITAL ROOM: A LITTLE
STANDING UP FROM CHAIR USING ARMS: A LITTLE
MOVING TO AND FROM BED TO CHAIR: A LITTLE
MOVING FROM LYING ON BACK TO SITTING ON SIDE OF FLAT BED WITH BEDRAILS: A LITTLE
CLIMB 3 TO 5 STEPS WITH RAILING: A LITTLE
DAILY ACTIVITIY SCORE: 18
MOVING TO AND FROM BED TO CHAIR: A LITTLE
WALKING IN HOSPITAL ROOM: A LITTLE
MOBILITY SCORE: 19
TOILETING: TOTAL
DRESSING REGULAR LOWER BODY CLOTHING: A LOT
HELP NEEDED FOR BATHING: A LOT
HELP NEEDED FOR BATHING: A LITTLE
DRESSING REGULAR LOWER BODY CLOTHING: A LITTLE
MOBILITY SCORE: 18
EATING MEALS: A LITTLE
CLIMB 3 TO 5 STEPS WITH RAILING: A LOT
DRESSING REGULAR UPPER BODY CLOTHING: A LITTLE
DRESSING REGULAR UPPER BODY CLOTHING: A LITTLE
PERSONAL GROOMING: A LITTLE
TURNING FROM BACK TO SIDE WHILE IN FLAT BAD: A LITTLE
TOILETING: A LITTLE
DAILY ACTIVITIY SCORE: 15

## 2024-10-31 ASSESSMENT — ACTIVITIES OF DAILY LIVING (ADL)
ADL_ASSISTANCE: INDEPENDENT
ADL_ASSISTANCE: INDEPENDENT
BATHING_ASSISTANCE: MINIMAL
ADLS_ADDRESSED: YES

## 2024-10-31 ASSESSMENT — PAIN SCALES - GENERAL
PAINLEVEL_OUTOF10: 0 - NO PAIN

## 2024-10-31 ASSESSMENT — PAIN - FUNCTIONAL ASSESSMENT
PAIN_FUNCTIONAL_ASSESSMENT: 0-10

## 2024-11-13 LAB
ATRIAL RATE: 81 BPM
P AXIS: 54 DEGREES
P OFFSET: 182 MS
P ONSET: 127 MS
PR INTERVAL: 166 MS
Q ONSET: 210 MS
QRS COUNT: 13 BEATS
QRS DURATION: 102 MS
QT INTERVAL: 412 MS
QTC CALCULATION(BAZETT): 478 MS
QTC FREDERICIA: 455 MS
R AXIS: 17 DEGREES
T AXIS: 47 DEGREES
T OFFSET: 416 MS
VENTRICULAR RATE: 81 BPM

## 2024-11-19 DIAGNOSIS — E78.2 MIXED HYPERLIPIDEMIA: ICD-10-CM

## 2024-11-19 RX ORDER — ATORVASTATIN CALCIUM 20 MG/1
20 TABLET, FILM COATED ORAL NIGHTLY
Qty: 90 TABLET | Refills: 1 | Status: SHIPPED | OUTPATIENT
Start: 2024-11-19

## 2024-12-05 ENCOUNTER — APPOINTMENT (OUTPATIENT)
Dept: SLEEP MEDICINE | Facility: CLINIC | Age: 79
End: 2024-12-05
Payer: MEDICARE

## 2024-12-19 ENCOUNTER — OFFICE VISIT (OUTPATIENT)
Dept: HEMATOLOGY/ONCOLOGY | Facility: CLINIC | Age: 79
End: 2024-12-19
Payer: MEDICARE

## 2024-12-19 VITALS
WEIGHT: 165.79 LBS | OXYGEN SATURATION: 92 % | TEMPERATURE: 97.5 F | SYSTOLIC BLOOD PRESSURE: 134 MMHG | DIASTOLIC BLOOD PRESSURE: 68 MMHG | RESPIRATION RATE: 17 BRPM | BODY MASS INDEX: 32.55 KG/M2 | HEIGHT: 60 IN | HEART RATE: 86 BPM

## 2024-12-19 DIAGNOSIS — D47.3 ESSENTIAL THROMBOCYTHEMIA (MULTI): Primary | ICD-10-CM

## 2024-12-19 LAB
ALBUMIN SERPL BCP-MCNC: 3.7 G/DL (ref 3.4–5)
ALP SERPL-CCNC: 229 U/L (ref 33–136)
ALT SERPL W P-5'-P-CCNC: 18 U/L (ref 7–45)
ANION GAP SERPL CALC-SCNC: 15 MMOL/L (ref 10–20)
AST SERPL W P-5'-P-CCNC: 19 U/L (ref 9–39)
BASOPHILS # BLD AUTO: 0.04 X10*3/UL (ref 0–0.1)
BASOPHILS NFR BLD AUTO: 0.5 %
BILIRUB SERPL-MCNC: 0.4 MG/DL (ref 0–1.2)
BUN SERPL-MCNC: 37 MG/DL (ref 6–23)
CALCIUM SERPL-MCNC: 9.4 MG/DL (ref 8.6–10.3)
CHLORIDE SERPL-SCNC: 96 MMOL/L (ref 98–107)
CO2 SERPL-SCNC: 30 MMOL/L (ref 21–32)
CREAT SERPL-MCNC: 4.97 MG/DL (ref 0.5–1.05)
EGFRCR SERPLBLD CKD-EPI 2021: 8 ML/MIN/1.73M*2
EOSINOPHIL # BLD AUTO: 0.45 X10*3/UL (ref 0–0.4)
EOSINOPHIL NFR BLD AUTO: 5.4 %
ERYTHROCYTE [DISTWIDTH] IN BLOOD BY AUTOMATED COUNT: 14.7 % (ref 11.5–14.5)
GLUCOSE SERPL-MCNC: 116 MG/DL (ref 74–99)
HCT VFR BLD AUTO: 33.5 % (ref 36–46)
HGB BLD-MCNC: 10.6 G/DL (ref 12–16)
IMM GRANULOCYTES # BLD AUTO: 0.02 X10*3/UL (ref 0–0.5)
IMM GRANULOCYTES NFR BLD AUTO: 0.2 % (ref 0–0.9)
LYMPHOCYTES # BLD AUTO: 1.24 X10*3/UL (ref 0.8–3)
LYMPHOCYTES NFR BLD AUTO: 14.8 %
MCH RBC QN AUTO: 32.9 PG (ref 26–34)
MCHC RBC AUTO-ENTMCNC: 31.6 G/DL (ref 32–36)
MCV RBC AUTO: 104 FL (ref 80–100)
MONOCYTES # BLD AUTO: 0.95 X10*3/UL (ref 0.05–0.8)
MONOCYTES NFR BLD AUTO: 11.3 %
NEUTROPHILS # BLD AUTO: 5.7 X10*3/UL (ref 1.6–5.5)
NEUTROPHILS NFR BLD AUTO: 67.8 %
PLATELET # BLD AUTO: 408 X10*3/UL (ref 150–450)
POTASSIUM SERPL-SCNC: 4.1 MMOL/L (ref 3.5–5.3)
PROT SERPL-MCNC: 6.6 G/DL (ref 6.4–8.2)
RBC # BLD AUTO: 3.22 X10*6/UL (ref 4–5.2)
SODIUM SERPL-SCNC: 137 MMOL/L (ref 136–145)
WBC # BLD AUTO: 8.4 X10*3/UL (ref 4.4–11.3)

## 2024-12-19 PROCEDURE — 1125F AMNT PAIN NOTED PAIN PRSNT: CPT | Performed by: PHYSICIAN ASSISTANT

## 2024-12-19 PROCEDURE — 3078F DIAST BP <80 MM HG: CPT | Performed by: PHYSICIAN ASSISTANT

## 2024-12-19 PROCEDURE — 3075F SYST BP GE 130 - 139MM HG: CPT | Performed by: PHYSICIAN ASSISTANT

## 2024-12-19 PROCEDURE — 99214 OFFICE O/P EST MOD 30 MIN: CPT | Performed by: PHYSICIAN ASSISTANT

## 2024-12-19 PROCEDURE — 1157F ADVNC CARE PLAN IN RCRD: CPT | Performed by: PHYSICIAN ASSISTANT

## 2024-12-19 PROCEDURE — 36415 COLL VENOUS BLD VENIPUNCTURE: CPT | Performed by: PHYSICIAN ASSISTANT

## 2024-12-19 PROCEDURE — 85025 COMPLETE CBC W/AUTO DIFF WBC: CPT | Performed by: PHYSICIAN ASSISTANT

## 2024-12-19 PROCEDURE — 84075 ASSAY ALKALINE PHOSPHATASE: CPT | Performed by: PHYSICIAN ASSISTANT

## 2024-12-19 RX ORDER — HYDROXYUREA 500 MG/1
500 CAPSULE ORAL
Qty: 90 CAPSULE | Refills: 1 | Status: SHIPPED | OUTPATIENT
Start: 2024-12-20

## 2024-12-19 ASSESSMENT — PAIN SCALES - GENERAL: PAINLEVEL_OUTOF10: 2

## 2024-12-19 NOTE — PROGRESS NOTES
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    #2) breast cancer; 1) s/p mastectomy in 1991 s/p chemotherapy and tamoxifen. contralateral breast cancer in 2009 treated with mastectomy and arimidex for 5 years.    #3) CRI on dialysis     History of Present Illness:   Patient presents for follow up. Reports fatigue but is otherwise doing well.  Continues on Hydrea 500 mg MWF. Tolerating well. No new complaints.    Review of Systems:    All of the systems have been reviewed and are negative for complaints except what is stated in the HPI and/or past medical history.    Allergies and Intolerances:  Allergies   Allergen Reactions    Bee Pollen Unknown    House Dust Unknown    House Dust Mite Unknown    Adhesive Tape-Silicones Rash     Outpatient Medication Profile:  Current Outpatient Medications on File Prior to Visit   Medication Sig Dispense Refill    acetaminophen (Tylenol) 325 mg tablet Take 2 tablets (650 mg) by mouth every 8 hours if needed for mild pain (1 - 3), headaches or fever (temp greater than 38.0 C). Max 2000mg daily      amitriptyline (Elavil) 10 mg tablet Take 1 tablet (10 mg) by mouth once daily.      amLODIPine (Norvasc) 5 mg tablet Take 1 tablet (5 mg) by mouth once daily.      atorvastatin (Lipitor) 20 mg tablet Take 1 tablet (20 mg) by mouth once daily at bedtime. 90 tablet 1    B complex-vitamin C-folic acid (Nephrocaps) 1 mg capsule Take 1 capsule by mouth once daily.      Bacillus coagulans/inulin (PROBIOTIC FORMULA, INULIN, ORAL) Take 1 capsule by mouth once daily.      calcitriol (Rocaltrol) 0.25 mcg capsule Take 1 capsule (0.25 mcg) by mouth once a day on Monday, Wednesday, and Friday.      carvedilol (Coreg) 25 mg tablet Take 1 tablet (25 mg) by mouth 2 times daily (morning and late afternoon).       cholecalciferol (Vitamin D-3) 50 mcg (2,000 unit) capsule Take 1 capsule (50 mcg) by mouth early in the morning..      epoetin palomo (Procrit) 2,000 unit/mL injection Inject 1 mL (2,000 Units) under the skin once a day on Monday, Wednesday, and Friday.      guaiFENesin (Mucinex) 600 mg 12 hr tablet Take 1 tablet (600 mg) by mouth every 12 hours if needed for cough or congestion.      hydroxyurea (Hydrea) 500 mg capsule Take 1 capsule (500 mg total) by mouth once a day on Monday, Wednesday, and Friday.  M, W, F 90 capsule 1    loratadine (Claritin) 10 mg tablet Take 1 tablet (10 mg) by mouth once daily as needed for allergies. In the morning      losartan (Cozaar) 25 mg tablet Take 4 tablets (100 mg) by mouth once daily.      meclizine (Antivert) 12.5 mg tablet Take 1 tablet (12.5 mg) by mouth 3 times a day as needed for dizziness. 30 tablet 0    omeprazole (PriLOSEC) 20 mg DR capsule Take 1 capsule (20 mg) by mouth once daily as needed.      oxygen (O2) gas therapy Oxygen   Refills: 0       Active      sennosides-docusate sodium (Senna-S) 8.6-50 mg tablet Take 1 tablet by mouth once daily at bedtime.      sevelamer carbonate (Renvela) 800 mg tablet Take 1 tablet (800 mg) by mouth 3 times daily (morning, midday, late afternoon). Swallow tablet whole; do not crush, break, or chew. 90 tablet 0    sodium chloride (Ocean) 0.65 % nasal spray Administer 2 sprays into each nostril every 1 hour if needed for congestion.       No current facility-administered medications on file prior to visit.     Vitals:      10/31/2024    12:08 AM 10/31/2024     3:57 AM 10/31/2024     4:10 AM 10/31/2024     9:42 AM 10/31/2024     1:13 PM 10/31/2024     2:26 PM 12/19/2024     1:18 PM   Vitals   Systolic 148  163   174 134   Diastolic 62  69   77 68   BP Location Left arm  Left arm   Left arm Left arm   Heart Rate 61  72 81 79 81 86   Temp 36 °C (96.8 °F)  36 °C (96.8 °F)  36.4 °C (97.5 °F) 36.5 °C (97.7 °F) 36.4 °C (97.5 °F)   Resp 18 18 18   " 16 17   Height       1.521 m (4' 11.88\")    Weight (lb)       165.79   BMI       32.51 kg/m2   BSA (m2)       1.78 m2   Visit Report       Report       Significant value     Physical Exam:   Constitutional: alert, awake and oriented, not in acute distress   HEENT: moist mucous membranes, normal nose   Neck: supple, no lymphadenopathy   EYES: PERRL, EOM intact, conjunctiva normal  Skin: no jaundice, rash or erythema  Neurological: AAOx3, no gross focal deficit   Psychiatric: normal mood and behavior     Labs:  Labs:  Lab Results   Component Value Date    WBC 8.4 12/19/2024    NEUTROABS 5.70 (H) 12/19/2024    IGABSOL 0.02 12/19/2024    LYMPHSABS 1.24 12/19/2024    MONOSABS 0.95 (H) 12/19/2024    EOSABS 0.45 (H) 12/19/2024    BASOSABS 0.04 12/19/2024    RBC 3.22 (L) 12/19/2024     (H) 12/19/2024    MCHC 31.6 (L) 12/19/2024    HGB 10.6 (L) 12/19/2024    HCT 33.5 (L) 12/19/2024     12/19/2024     No results found for: \"RETICCTPCT\"   Lab Results   Component Value Date    CREATININE 4.97 (H) 12/19/2024    BUN 37 (H) 12/19/2024    EGFR 8 (L) 12/19/2024     12/19/2024    K 4.1 12/19/2024    CL 96 (L) 12/19/2024    CO2 30 12/19/2024      Lab Results   Component Value Date    ALT 18 12/19/2024    AST 19 12/19/2024     (H) 12/04/2023    ALKPHOS 229 (H) 12/19/2024    BILITOT 0.4 12/19/2024      Assessment/Plan:    71 yo with history of myeloproliferative disease which appears from the records to be mostly like essential thrombocytosis for which she has been on anagrelide for many years with good control of her platelets.     Currently on Hydrea 500 mg MWF due to recall of Anagrelide 0.5 mg. Platelets at goal.     Anemia from CRI/ESRD, FTN > 100. Getting Procrit PRN at dialysis; goal is HgB 10-11    Follow up pulmonary and cardiology.     Ferritin >1000; HFE c/w H63D heterozygous. Discussed limiting the amount of iron she receives at dialysis.      Continue to monitor CBC and Ferritin/iron studies " every 6 months    RTC in 6 months    Patient verbalized understanding, and all her questions were answered to her satisfaction    30 min spent with patient greater than 50 % of which was spent in consultation and coordination of care.

## 2025-01-22 ENCOUNTER — HOSPITAL ENCOUNTER (INPATIENT)
Facility: HOSPITAL | Age: 80
DRG: 391 | End: 2025-01-22
Attending: STUDENT IN AN ORGANIZED HEALTH CARE EDUCATION/TRAINING PROGRAM | Admitting: INTERNAL MEDICINE
Payer: MEDICARE

## 2025-01-22 ENCOUNTER — APPOINTMENT (OUTPATIENT)
Dept: RADIOLOGY | Facility: HOSPITAL | Age: 80
DRG: 391 | End: 2025-01-22
Payer: MEDICARE

## 2025-01-22 DIAGNOSIS — R10.9 ABDOMINAL PAIN, UNSPECIFIED ABDOMINAL LOCATION: Primary | ICD-10-CM

## 2025-01-22 DIAGNOSIS — K52.9 ENTEROCOLITIS: ICD-10-CM

## 2025-01-22 DIAGNOSIS — R11.2 NAUSEA AND VOMITING, UNSPECIFIED VOMITING TYPE: ICD-10-CM

## 2025-01-22 LAB
ABO GROUP (TYPE) IN BLOOD: NORMAL
ALBUMIN SERPL BCP-MCNC: 4.2 G/DL (ref 3.4–5)
ALP SERPL-CCNC: 249 U/L (ref 33–136)
ALT SERPL W P-5'-P-CCNC: 32 U/L (ref 7–45)
ANION GAP SERPL CALC-SCNC: 19 MMOL/L (ref 10–20)
ANTIBODY SCREEN: NORMAL
APTT PPP: 30 SECONDS (ref 27–38)
AST SERPL W P-5'-P-CCNC: 25 U/L (ref 9–39)
BASOPHILS # BLD AUTO: 0.04 X10*3/UL (ref 0–0.1)
BASOPHILS NFR BLD AUTO: 0.2 %
BILIRUB SERPL-MCNC: 0.4 MG/DL (ref 0–1.2)
BUN SERPL-MCNC: 64 MG/DL (ref 6–23)
CALCIUM SERPL-MCNC: 9.9 MG/DL (ref 8.6–10.3)
CHLORIDE SERPL-SCNC: 97 MMOL/L (ref 98–107)
CO2 SERPL-SCNC: 25 MMOL/L (ref 21–32)
CREAT SERPL-MCNC: 7.05 MG/DL (ref 0.5–1.05)
EGFRCR SERPLBLD CKD-EPI 2021: 5 ML/MIN/1.73M*2
EOSINOPHIL # BLD AUTO: 0.49 X10*3/UL (ref 0–0.4)
EOSINOPHIL NFR BLD AUTO: 2.9 %
ERYTHROCYTE [DISTWIDTH] IN BLOOD BY AUTOMATED COUNT: 14.6 % (ref 11.5–14.5)
FLUAV RNA RESP QL NAA+PROBE: NOT DETECTED
FLUBV RNA RESP QL NAA+PROBE: NOT DETECTED
GLUCOSE SERPL-MCNC: 122 MG/DL (ref 74–99)
HCT VFR BLD AUTO: 35.9 % (ref 36–46)
HGB BLD-MCNC: 11.9 G/DL (ref 12–16)
IMM GRANULOCYTES # BLD AUTO: 0.09 X10*3/UL (ref 0–0.5)
IMM GRANULOCYTES NFR BLD AUTO: 0.5 % (ref 0–0.9)
INR PPP: 1 (ref 0.9–1.1)
LACTATE SERPL-SCNC: 1.4 MMOL/L (ref 0.4–2)
LIPASE SERPL-CCNC: 61 U/L (ref 9–82)
LYMPHOCYTES # BLD AUTO: 0.72 X10*3/UL (ref 0.8–3)
LYMPHOCYTES NFR BLD AUTO: 4.2 %
MCH RBC QN AUTO: 34.5 PG (ref 26–34)
MCHC RBC AUTO-ENTMCNC: 33.1 G/DL (ref 32–36)
MCV RBC AUTO: 104 FL (ref 80–100)
MONOCYTES # BLD AUTO: 0.94 X10*3/UL (ref 0.05–0.8)
MONOCYTES NFR BLD AUTO: 5.5 %
NEUTROPHILS # BLD AUTO: 14.88 X10*3/UL (ref 1.6–5.5)
NEUTROPHILS NFR BLD AUTO: 86.7 %
NRBC BLD-RTO: 0 /100 WBCS (ref 0–0)
OB PNL GAST: NEGATIVE
PH GAST: 3 [PH]
PLATELET # BLD AUTO: 437 X10*3/UL (ref 150–450)
POTASSIUM SERPL-SCNC: 4.7 MMOL/L (ref 3.5–5.3)
PROT SERPL-MCNC: 7.2 G/DL (ref 6.4–8.2)
PROTHROMBIN TIME: 10.9 SECONDS (ref 9.8–12.8)
RBC # BLD AUTO: 3.45 X10*6/UL (ref 4–5.2)
RH FACTOR (ANTIGEN D): NORMAL
SARS-COV-2 RNA RESP QL NAA+PROBE: NOT DETECTED
SODIUM SERPL-SCNC: 136 MMOL/L (ref 136–145)
WBC # BLD AUTO: 17.2 X10*3/UL (ref 4.4–11.3)

## 2025-01-22 PROCEDURE — 74174 CTA ABD&PLVS W/CONTRAST: CPT

## 2025-01-22 PROCEDURE — 87324 CLOSTRIDIUM AG IA: CPT | Mod: GEALAB | Performed by: INTERNAL MEDICINE

## 2025-01-22 PROCEDURE — 99285 EMERGENCY DEPT VISIT HI MDM: CPT | Mod: 25 | Performed by: STUDENT IN AN ORGANIZED HEALTH CARE EDUCATION/TRAINING PROGRAM

## 2025-01-22 PROCEDURE — 71045 X-RAY EXAM CHEST 1 VIEW: CPT

## 2025-01-22 PROCEDURE — 96375 TX/PRO/DX INJ NEW DRUG ADDON: CPT

## 2025-01-22 PROCEDURE — 87636 SARSCOV2 & INF A&B AMP PRB: CPT | Performed by: STUDENT IN AN ORGANIZED HEALTH CARE EDUCATION/TRAINING PROGRAM

## 2025-01-22 PROCEDURE — 74174 CTA ABD&PLVS W/CONTRAST: CPT | Performed by: RADIOLOGY

## 2025-01-22 PROCEDURE — 2500000004 HC RX 250 GENERAL PHARMACY W/ HCPCS (ALT 636 FOR OP/ED): Performed by: STUDENT IN AN ORGANIZED HEALTH CARE EDUCATION/TRAINING PROGRAM

## 2025-01-22 PROCEDURE — 85610 PROTHROMBIN TIME: CPT | Performed by: STUDENT IN AN ORGANIZED HEALTH CARE EDUCATION/TRAINING PROGRAM

## 2025-01-22 PROCEDURE — 85025 COMPLETE CBC W/AUTO DIFF WBC: CPT | Performed by: STUDENT IN AN ORGANIZED HEALTH CARE EDUCATION/TRAINING PROGRAM

## 2025-01-22 PROCEDURE — 2500000001 HC RX 250 WO HCPCS SELF ADMINISTERED DRUGS (ALT 637 FOR MEDICARE OP): Performed by: INTERNAL MEDICINE

## 2025-01-22 PROCEDURE — 87506 IADNA-DNA/RNA PROBE TQ 6-11: CPT | Mod: GEALAB | Performed by: INTERNAL MEDICINE

## 2025-01-22 PROCEDURE — 71045 X-RAY EXAM CHEST 1 VIEW: CPT | Performed by: STUDENT IN AN ORGANIZED HEALTH CARE EDUCATION/TRAINING PROGRAM

## 2025-01-22 PROCEDURE — 83605 ASSAY OF LACTIC ACID: CPT | Performed by: STUDENT IN AN ORGANIZED HEALTH CARE EDUCATION/TRAINING PROGRAM

## 2025-01-22 PROCEDURE — 86901 BLOOD TYPING SEROLOGIC RH(D): CPT | Performed by: STUDENT IN AN ORGANIZED HEALTH CARE EDUCATION/TRAINING PROGRAM

## 2025-01-22 PROCEDURE — 82271 OCCULT BLOOD OTHER SOURCES: CPT | Performed by: STUDENT IN AN ORGANIZED HEALTH CARE EDUCATION/TRAINING PROGRAM

## 2025-01-22 PROCEDURE — 85730 THROMBOPLASTIN TIME PARTIAL: CPT | Performed by: STUDENT IN AN ORGANIZED HEALTH CARE EDUCATION/TRAINING PROGRAM

## 2025-01-22 PROCEDURE — 36415 COLL VENOUS BLD VENIPUNCTURE: CPT | Performed by: STUDENT IN AN ORGANIZED HEALTH CARE EDUCATION/TRAINING PROGRAM

## 2025-01-22 PROCEDURE — 83690 ASSAY OF LIPASE: CPT | Performed by: STUDENT IN AN ORGANIZED HEALTH CARE EDUCATION/TRAINING PROGRAM

## 2025-01-22 PROCEDURE — 99223 1ST HOSP IP/OBS HIGH 75: CPT | Performed by: INTERNAL MEDICINE

## 2025-01-22 PROCEDURE — 80053 COMPREHEN METABOLIC PANEL: CPT | Performed by: STUDENT IN AN ORGANIZED HEALTH CARE EDUCATION/TRAINING PROGRAM

## 2025-01-22 PROCEDURE — 2500000005 HC RX 250 GENERAL PHARMACY W/O HCPCS: Performed by: INTERNAL MEDICINE

## 2025-01-22 PROCEDURE — 2500000004 HC RX 250 GENERAL PHARMACY W/ HCPCS (ALT 636 FOR OP/ED): Performed by: INTERNAL MEDICINE

## 2025-01-22 PROCEDURE — 2550000001 HC RX 255 CONTRASTS: Performed by: STUDENT IN AN ORGANIZED HEALTH CARE EDUCATION/TRAINING PROGRAM

## 2025-01-22 PROCEDURE — 96374 THER/PROPH/DIAG INJ IV PUSH: CPT

## 2025-01-22 PROCEDURE — 1200000002 HC GENERAL ROOM WITH TELEMETRY DAILY

## 2025-01-22 PROCEDURE — 87493 C DIFF AMPLIFIED PROBE: CPT | Mod: GEALAB | Performed by: INTERNAL MEDICINE

## 2025-01-22 PROCEDURE — 2500000001 HC RX 250 WO HCPCS SELF ADMINISTERED DRUGS (ALT 637 FOR MEDICARE OP): Performed by: NURSE PRACTITIONER

## 2025-01-22 PROCEDURE — 99221 1ST HOSP IP/OBS SF/LOW 40: CPT | Performed by: INTERNAL MEDICINE

## 2025-01-22 RX ORDER — BENZONATATE 100 MG/1
100 CAPSULE ORAL 4 TIMES DAILY PRN
Status: DISCONTINUED | OUTPATIENT
Start: 2025-01-22 | End: 2025-01-28 | Stop reason: HOSPADM

## 2025-01-22 RX ORDER — ONDANSETRON HYDROCHLORIDE 2 MG/ML
4 INJECTION, SOLUTION INTRAVENOUS ONCE
Status: COMPLETED | OUTPATIENT
Start: 2025-01-22 | End: 2025-01-22

## 2025-01-22 RX ORDER — PANTOPRAZOLE SODIUM 40 MG/10ML
80 INJECTION, POWDER, LYOPHILIZED, FOR SOLUTION INTRAVENOUS ONCE
Status: COMPLETED | OUTPATIENT
Start: 2025-01-22 | End: 2025-01-22

## 2025-01-22 RX ORDER — HEPARIN SODIUM 5000 [USP'U]/ML
2500 INJECTION, SOLUTION INTRAVENOUS; SUBCUTANEOUS EVERY 8 HOURS
Status: DISCONTINUED | OUTPATIENT
Start: 2025-01-22 | End: 2025-01-28 | Stop reason: HOSPADM

## 2025-01-22 RX ORDER — ONDANSETRON HYDROCHLORIDE 2 MG/ML
4 INJECTION, SOLUTION INTRAVENOUS EVERY 4 HOURS PRN
Status: DISCONTINUED | OUTPATIENT
Start: 2025-01-22 | End: 2025-01-28 | Stop reason: HOSPADM

## 2025-01-22 RX ORDER — NITROGLYCERIN 0.4 MG/1
0.4 TABLET SUBLINGUAL EVERY 5 MIN PRN
COMMUNITY

## 2025-01-22 RX ORDER — ALUMINUM HYDROXIDE, MAGNESIUM HYDROXIDE, AND SIMETHICONE 1200; 120; 1200 MG/30ML; MG/30ML; MG/30ML
10 SUSPENSION ORAL 4 TIMES DAILY PRN
Status: DISCONTINUED | OUTPATIENT
Start: 2025-01-22 | End: 2025-01-28 | Stop reason: HOSPADM

## 2025-01-22 RX ORDER — AMITRIPTYLINE HYDROCHLORIDE 10 MG/1
10 TABLET, FILM COATED ORAL DAILY
Status: DISCONTINUED | OUTPATIENT
Start: 2025-01-22 | End: 2025-01-23

## 2025-01-22 RX ORDER — HYDROXYUREA 500 MG/1
500 CAPSULE ORAL
Status: DISCONTINUED | OUTPATIENT
Start: 2025-01-22 | End: 2025-01-28 | Stop reason: HOSPADM

## 2025-01-22 RX ORDER — PANTOPRAZOLE SODIUM 40 MG/10ML
40 INJECTION, POWDER, LYOPHILIZED, FOR SOLUTION INTRAVENOUS 2 TIMES DAILY
Status: DISCONTINUED | OUTPATIENT
Start: 2025-01-22 | End: 2025-01-23

## 2025-01-22 RX ORDER — ACETAMINOPHEN 500 MG
5 TABLET ORAL NIGHTLY PRN
Status: DISCONTINUED | OUTPATIENT
Start: 2025-01-22 | End: 2025-01-28 | Stop reason: HOSPADM

## 2025-01-22 RX ORDER — AMLODIPINE BESYLATE 5 MG/1
5 TABLET ORAL DAILY
Status: DISCONTINUED | OUTPATIENT
Start: 2025-01-22 | End: 2025-01-28 | Stop reason: HOSPADM

## 2025-01-22 RX ORDER — CALCITRIOL 0.25 UG/1
0.25 CAPSULE ORAL
Status: DISCONTINUED | OUTPATIENT
Start: 2025-01-22 | End: 2025-01-28 | Stop reason: HOSPADM

## 2025-01-22 RX ORDER — SODIUM CHLORIDE 9 MG/ML
70 INJECTION, SOLUTION INTRAVENOUS CONTINUOUS
Status: DISCONTINUED | OUTPATIENT
Start: 2025-01-22 | End: 2025-01-23

## 2025-01-22 RX ORDER — CARVEDILOL 25 MG/1
25 TABLET ORAL
Status: DISCONTINUED | OUTPATIENT
Start: 2025-01-22 | End: 2025-01-28 | Stop reason: HOSPADM

## 2025-01-22 RX ORDER — ATORVASTATIN CALCIUM 20 MG/1
20 TABLET, FILM COATED ORAL NIGHTLY
Status: DISCONTINUED | OUTPATIENT
Start: 2025-01-22 | End: 2025-01-28 | Stop reason: HOSPADM

## 2025-01-22 RX ORDER — ACETAMINOPHEN 325 MG/1
650 TABLET ORAL EVERY 8 HOURS PRN
Status: DISCONTINUED | OUTPATIENT
Start: 2025-01-22 | End: 2025-01-28 | Stop reason: HOSPADM

## 2025-01-22 RX ORDER — GUAIFENESIN 600 MG/1
600 TABLET, EXTENDED RELEASE ORAL EVERY 12 HOURS PRN
Status: DISCONTINUED | OUTPATIENT
Start: 2025-01-22 | End: 2025-01-28 | Stop reason: HOSPADM

## 2025-01-22 RX ORDER — LOSARTAN POTASSIUM 50 MG/1
100 TABLET ORAL DAILY
Status: DISCONTINUED | OUTPATIENT
Start: 2025-01-22 | End: 2025-01-28 | Stop reason: HOSPADM

## 2025-01-22 RX ORDER — METOCLOPRAMIDE HYDROCHLORIDE 5 MG/ML
5 INJECTION INTRAMUSCULAR; INTRAVENOUS ONCE
Status: COMPLETED | OUTPATIENT
Start: 2025-01-22 | End: 2025-01-22

## 2025-01-22 RX ADMIN — AMLODIPINE BESYLATE 5 MG: 5 TABLET ORAL at 14:13

## 2025-01-22 RX ADMIN — HEPARIN SODIUM 2500 UNITS: 5000 INJECTION, SOLUTION INTRAVENOUS; SUBCUTANEOUS at 22:25

## 2025-01-22 RX ADMIN — ONDANSETRON 4 MG: 2 INJECTION, SOLUTION INTRAMUSCULAR; INTRAVENOUS at 20:08

## 2025-01-22 RX ADMIN — IOHEXOL 90 ML: 350 INJECTION, SOLUTION INTRAVENOUS at 05:43

## 2025-01-22 RX ADMIN — PANTOPRAZOLE SODIUM 40 MG: 40 INJECTION, POWDER, FOR SOLUTION INTRAVENOUS at 20:08

## 2025-01-22 RX ADMIN — Medication 5 MG: at 22:25

## 2025-01-22 RX ADMIN — AMITRIPTYLINE HYDROCHLORIDE 10 MG: 10 TABLET, FILM COATED ORAL at 14:26

## 2025-01-22 RX ADMIN — SODIUM CHLORIDE 70 ML/HR: 9 INJECTION, SOLUTION INTRAVENOUS at 14:13

## 2025-01-22 RX ADMIN — Medication 2 L/MIN: at 20:00

## 2025-01-22 RX ADMIN — CALCITRIOL CAPSULES 0.25 MCG 0.25 MCG: 0.25 CAPSULE ORAL at 14:26

## 2025-01-22 RX ADMIN — HYDROXYUREA 500 MG: 500 CAPSULE ORAL at 14:26

## 2025-01-22 RX ADMIN — PANTOPRAZOLE SODIUM 80 MG: 40 INJECTION, POWDER, FOR SOLUTION INTRAVENOUS at 04:18

## 2025-01-22 RX ADMIN — ONDANSETRON 4 MG: 2 INJECTION, SOLUTION INTRAMUSCULAR; INTRAVENOUS at 04:17

## 2025-01-22 RX ADMIN — METOCLOPRAMIDE 5 MG: 5 INJECTION, SOLUTION INTRAMUSCULAR; INTRAVENOUS at 05:17

## 2025-01-22 RX ADMIN — Medication 2.5 L/MIN: at 14:23

## 2025-01-22 RX ADMIN — CARVEDILOL 25 MG: 25 TABLET, FILM COATED ORAL at 16:38

## 2025-01-22 RX ADMIN — LOSARTAN POTASSIUM 100 MG: 50 TABLET, FILM COATED ORAL at 14:13

## 2025-01-22 RX ADMIN — ATORVASTATIN CALCIUM 20 MG: 20 TABLET, FILM COATED ORAL at 20:08

## 2025-01-22 RX ADMIN — HEPARIN SODIUM 2500 UNITS: 5000 INJECTION, SOLUTION INTRAVENOUS; SUBCUTANEOUS at 14:13

## 2025-01-22 SDOH — ECONOMIC STABILITY: FOOD INSECURITY: HOW HARD IS IT FOR YOU TO PAY FOR THE VERY BASICS LIKE FOOD, HOUSING, MEDICAL CARE, AND HEATING?: NOT HARD AT ALL

## 2025-01-22 SDOH — SOCIAL STABILITY: SOCIAL INSECURITY: WITHIN THE LAST YEAR, HAVE YOU BEEN AFRAID OF YOUR PARTNER OR EX-PARTNER?: NO

## 2025-01-22 SDOH — SOCIAL STABILITY: SOCIAL INSECURITY: WITHIN THE LAST YEAR, HAVE YOU BEEN HUMILIATED OR EMOTIONALLY ABUSED IN OTHER WAYS BY YOUR PARTNER OR EX-PARTNER?: NO

## 2025-01-22 SDOH — SOCIAL STABILITY: SOCIAL INSECURITY: WERE YOU ABLE TO COMPLETE ALL THE BEHAVIORAL HEALTH SCREENINGS?: YES

## 2025-01-22 SDOH — ECONOMIC STABILITY: INCOME INSECURITY: IN THE PAST 12 MONTHS HAS THE ELECTRIC, GAS, OIL, OR WATER COMPANY THREATENED TO SHUT OFF SERVICES IN YOUR HOME?: NO

## 2025-01-22 SDOH — ECONOMIC STABILITY: HOUSING INSECURITY: IN THE LAST 12 MONTHS, WAS THERE A TIME WHEN YOU WERE NOT ABLE TO PAY THE MORTGAGE OR RENT ON TIME?: NO

## 2025-01-22 SDOH — SOCIAL STABILITY: SOCIAL INSECURITY: HAVE YOU HAD THOUGHTS OF HARMING ANYONE ELSE?: NO

## 2025-01-22 SDOH — ECONOMIC STABILITY: HOUSING INSECURITY: IN THE PAST 12 MONTHS, HOW MANY TIMES HAVE YOU MOVED WHERE YOU WERE LIVING?: 0

## 2025-01-22 SDOH — ECONOMIC STABILITY: FOOD INSECURITY: WITHIN THE PAST 12 MONTHS, THE FOOD YOU BOUGHT JUST DIDN'T LAST AND YOU DIDN'T HAVE MONEY TO GET MORE.: NEVER TRUE

## 2025-01-22 SDOH — SOCIAL STABILITY: SOCIAL INSECURITY: HAVE YOU HAD ANY THOUGHTS OF HARMING ANYONE ELSE?: NO

## 2025-01-22 SDOH — SOCIAL STABILITY: SOCIAL INSECURITY: DOES ANYONE TRY TO KEEP YOU FROM HAVING/CONTACTING OTHER FRIENDS OR DOING THINGS OUTSIDE YOUR HOME?: NO

## 2025-01-22 SDOH — SOCIAL STABILITY: SOCIAL INSECURITY: ARE THERE ANY APPARENT SIGNS OF INJURIES/BEHAVIORS THAT COULD BE RELATED TO ABUSE/NEGLECT?: NO

## 2025-01-22 SDOH — ECONOMIC STABILITY: FOOD INSECURITY: WITHIN THE PAST 12 MONTHS, YOU WORRIED THAT YOUR FOOD WOULD RUN OUT BEFORE YOU GOT THE MONEY TO BUY MORE.: NEVER TRUE

## 2025-01-22 SDOH — ECONOMIC STABILITY: HOUSING INSECURITY: AT ANY TIME IN THE PAST 12 MONTHS, WERE YOU HOMELESS OR LIVING IN A SHELTER (INCLUDING NOW)?: NO

## 2025-01-22 SDOH — SOCIAL STABILITY: SOCIAL INSECURITY: HAS ANYONE EVER THREATENED TO HURT YOUR FAMILY OR YOUR PETS?: NO

## 2025-01-22 SDOH — SOCIAL STABILITY: SOCIAL INSECURITY: DO YOU FEEL ANYONE HAS EXPLOITED OR TAKEN ADVANTAGE OF YOU FINANCIALLY OR OF YOUR PERSONAL PROPERTY?: NO

## 2025-01-22 SDOH — ECONOMIC STABILITY: TRANSPORTATION INSECURITY: IN THE PAST 12 MONTHS, HAS LACK OF TRANSPORTATION KEPT YOU FROM MEDICAL APPOINTMENTS OR FROM GETTING MEDICATIONS?: NO

## 2025-01-22 SDOH — SOCIAL STABILITY: SOCIAL INSECURITY: DO YOU FEEL UNSAFE GOING BACK TO THE PLACE WHERE YOU ARE LIVING?: NO

## 2025-01-22 SDOH — SOCIAL STABILITY: SOCIAL INSECURITY: ARE YOU OR HAVE YOU BEEN THREATENED OR ABUSED PHYSICALLY, EMOTIONALLY, OR SEXUALLY BY ANYONE?: NO

## 2025-01-22 SDOH — SOCIAL STABILITY: SOCIAL INSECURITY: ABUSE: ADULT

## 2025-01-22 ASSESSMENT — COGNITIVE AND FUNCTIONAL STATUS - GENERAL
WALKING IN HOSPITAL ROOM: A LOT
TOILETING: A LITTLE
HELP NEEDED FOR BATHING: A LITTLE
MOVING FROM LYING ON BACK TO SITTING ON SIDE OF FLAT BED WITH BEDRAILS: A LITTLE
MOBILITY SCORE: 16
TOILETING: A LITTLE
CLIMB 3 TO 5 STEPS WITH RAILING: A LOT
TURNING FROM BACK TO SIDE WHILE IN FLAT BAD: A LITTLE
DAILY ACTIVITIY SCORE: 20
MOBILITY SCORE: 16
DRESSING REGULAR UPPER BODY CLOTHING: A LITTLE
PATIENT BASELINE BEDBOUND: NO
CLIMB 3 TO 5 STEPS WITH RAILING: A LOT
STANDING UP FROM CHAIR USING ARMS: A LITTLE
DRESSING REGULAR LOWER BODY CLOTHING: A LITTLE
HELP NEEDED FOR BATHING: A LITTLE
STANDING UP FROM CHAIR USING ARMS: A LITTLE
WALKING IN HOSPITAL ROOM: A LOT
DAILY ACTIVITIY SCORE: 20
MOVING TO AND FROM BED TO CHAIR: A LITTLE
TURNING FROM BACK TO SIDE WHILE IN FLAT BAD: A LITTLE
MOVING TO AND FROM BED TO CHAIR: A LITTLE
DRESSING REGULAR LOWER BODY CLOTHING: A LITTLE
DRESSING REGULAR UPPER BODY CLOTHING: A LITTLE
MOVING FROM LYING ON BACK TO SITTING ON SIDE OF FLAT BED WITH BEDRAILS: A LITTLE

## 2025-01-22 ASSESSMENT — ENCOUNTER SYMPTOMS
HEMATOLOGIC/LYMPHATIC NEGATIVE: 1
TREMORS: 1
VOMITING: 1
APPETITE CHANGE: 1
DIZZINESS: 1
PSYCHIATRIC NEGATIVE: 1
ENDOCRINE NEGATIVE: 1
ALLERGIC/IMMUNOLOGIC NEGATIVE: 1
EYES NEGATIVE: 1
WEAKNESS: 1
DIARRHEA: 1
FATIGUE: 1
NECK STIFFNESS: 1
NAUSEA: 1
ABDOMINAL PAIN: 1
ACTIVITY CHANGE: 1
MYALGIAS: 1
ARTHRALGIAS: 1
CARDIOVASCULAR NEGATIVE: 1
COUGH: 1
BACK PAIN: 1
NECK PAIN: 1
RHINORRHEA: 1
FEVER: 0

## 2025-01-22 ASSESSMENT — ACTIVITIES OF DAILY LIVING (ADL)
FEEDING YOURSELF: NEEDS ASSISTANCE
ASSISTIVE_DEVICE: WALKER
DRESSING YOURSELF: NEEDS ASSISTANCE
PATIENT'S MEMORY ADEQUATE TO SAFELY COMPLETE DAILY ACTIVITIES?: YES
HEARING - LEFT EAR: FUNCTIONAL
BATHING: NEEDS ASSISTANCE
WALKS IN HOME: NEEDS ASSISTANCE
JUDGMENT_ADEQUATE_SAFELY_COMPLETE_DAILY_ACTIVITIES: YES
GROOMING: NEEDS ASSISTANCE
HEARING - RIGHT EAR: FUNCTIONAL
LACK_OF_TRANSPORTATION: NO
ADEQUATE_TO_COMPLETE_ADL: YES
LACK_OF_TRANSPORTATION: NO
TOILETING: NEEDS ASSISTANCE

## 2025-01-22 ASSESSMENT — PATIENT HEALTH QUESTIONNAIRE - PHQ9
1. LITTLE INTEREST OR PLEASURE IN DOING THINGS: NOT AT ALL
SUM OF ALL RESPONSES TO PHQ9 QUESTIONS 1 & 2: 0
2. FEELING DOWN, DEPRESSED OR HOPELESS: NOT AT ALL

## 2025-01-22 ASSESSMENT — PAIN SCALES - GENERAL
PAINLEVEL_OUTOF10: 0 - NO PAIN

## 2025-01-22 ASSESSMENT — LIFESTYLE VARIABLES
HOW OFTEN DO YOU HAVE 6 OR MORE DRINKS ON ONE OCCASION: NEVER
AUDIT-C TOTAL SCORE: 0
HOW OFTEN DO YOU HAVE A DRINK CONTAINING ALCOHOL: NEVER
HOW MANY STANDARD DRINKS CONTAINING ALCOHOL DO YOU HAVE ON A TYPICAL DAY: PATIENT DOES NOT DRINK
SKIP TO QUESTIONS 9-10: 1
AUDIT-C TOTAL SCORE: 0

## 2025-01-22 ASSESSMENT — PAIN - FUNCTIONAL ASSESSMENT
PAIN_FUNCTIONAL_ASSESSMENT: 0-10
PAIN_FUNCTIONAL_ASSESSMENT: 0-10

## 2025-01-22 ASSESSMENT — COLUMBIA-SUICIDE SEVERITY RATING SCALE - C-SSRS
2. HAVE YOU ACTUALLY HAD ANY THOUGHTS OF KILLING YOURSELF?: NO
6. HAVE YOU EVER DONE ANYTHING, STARTED TO DO ANYTHING, OR PREPARED TO DO ANYTHING TO END YOUR LIFE?: NO
1. IN THE PAST MONTH, HAVE YOU WISHED YOU WERE DEAD OR WISHED YOU COULD GO TO SLEEP AND NOT WAKE UP?: NO

## 2025-01-22 NOTE — CONSULTS
Reason For Consult  Enterocolitis, possible GIB     History Of Present Illness  Joyce Bethea is a 79 y.o. female with PMH of HTN, breast cancer, ESRD on HD MWF, COPD on 2 L O2 at home, HEATH on CPAP presenting with nausea, vomiting, dizziness, diarrhea that started yesterday morning around 1 AM.  Patient endorses getting a COVID-vaccine over the weekend.  Patient denies fevers, chills, melena or hematochezia, states she has brown emesis. Endorses LLQ abdominal pain.   Gastric Hemoccult negative  Alk phos 198, elevation noted since 2017.  WBC WNL, Hgb 11.9.    Gastric occult blood negative   CT AP without acute abnormality.   - Cholelithiasis (previous finding 10/2024)  -Colonic diverticulosis  -Liquid stool in colon- possible enterocolitis  -Atherosclerosis of abdominal aorta and branches    Patient denies any previous EGD.  Colonoscopy noted in 2014 however no dictation found in EMR.    FL esophagus barium swallow noting spontaneous GERD in 2017      Past Medical History  She has a past medical history of Body mass index (BMI) 31.0-31.9, adult (04/12/2019), Encounter for follow-up examination after completed treatment for conditions other than malignant neoplasm (11/30/2017), Other conditions influencing health status (03/12/2019), Other specified disorders of bone, unspecified site (03/11/2015), Personal history of malignant neoplasm of breast (02/05/2014), Personal history of malignant neoplasm, unspecified, Personal history of other diseases of the circulatory system, Personal history of other diseases of the digestive system, Personal history of other diseases of the musculoskeletal system and connective tissue (01/21/2016), Personal history of other diseases of the musculoskeletal system and connective tissue, Personal history of other diseases of the nervous system and sense organs, Personal history of other diseases of urinary system, Respiratory failure, unspecified with hypoxia (Multi) (12/29/2017),  Shortness of breath (10/06/2017), Unspecified cataract, and Unspecified fracture of unspecified forearm, sequela (10/04/2018).    Surgical History  She has a past surgical history that includes Colonoscopy (02/05/2014); Tonsillectomy (02/05/2014); Mastectomy (02/05/2014); Rotator cuff repair (04/01/2014); Knee arthroscopy w/ meniscal repair (04/01/2014); Other surgical history (09/21/2021); MR angio head wo IV contrast (2/23/2019); MR angio neck wo IV contrast (2/23/2019); and IR CVC tunneled (10/12/2021).     Social History  She reports that she has never smoked. She has never used smokeless tobacco. She reports that she does not drink alcohol and does not use drugs.    Family History  Family History   Problem Relation Name Age of Onset    Hypertension Mother      Leukemia Mother      Osteoarthritis Mother      Colon cancer Father      Diabetes Father      Hypertension Father      Osteoarthritis Sister      Osteoarthritis Other Aunt     Stroke Other Aunt     Stroke Other Uncle     Breast cancer Other Family History         Allergies  Bee pollen, House dust, House dust mite, and Adhesive tape-silicones    Review of Systems  Review of Systems   Constitutional:  Positive for activity change, appetite change and fatigue. Negative for fever.   Gastrointestinal:  Positive for abdominal pain, diarrhea, nausea and vomiting.   Skin:  Positive for pallor.   Neurological:  Positive for weakness.   All other systems reviewed and are negative.        Physical Exam  Physical Exam  Vitals reviewed.   Constitutional:       Appearance: She is ill-appearing.   Eyes:      Extraocular Movements: Extraocular movements intact.   Cardiovascular:      Rate and Rhythm: Regular rhythm.      Heart sounds: Normal heart sounds.   Pulmonary:      Effort: Pulmonary effort is normal.      Breath sounds: Normal breath sounds.   Abdominal:      General: Bowel sounds are increased.      Palpations: Abdomen is soft.      Tenderness: There is  abdominal tenderness.   Genitourinary:     Rectum: Normal.   Skin:     General: Skin is warm.      Coloration: Skin is pale.   Neurological:      Mental Status: She is alert and oriented to person, place, and time.   Psychiatric:         Behavior: Behavior normal.           Last Recorded Vitals  Blood pressure 166/66, pulse 89, temperature 36.6 °C (97.9 °F), resp. rate 18, height 1.524 m (5'), weight 76.7 kg (169 lb), SpO2 99%.    Relevant Results      CT angio abdomen pelvis w and or wo IV IV contrast    Result Date: 1/22/2025  Interpreted By:  Juliette Yeh, STUDY: CT ANGIO ABDOMEN PELVIS W AND/OR WO IV IV CONTRAST;  1/22/2025 5:43 am   INDICATION: Signs/Symptoms:Left lower quadrant pain, concern for GI bleed   COMPARISON: None.   ACCESSION NUMBER(S): [id number]   ORDERING CLINICIAN: SOTERO WHEELER   TECHNIQUE: Noncontrast axial CT images were obtained through the abdomen and pelvis prior to administration of intravenous contrast. Thin-section axial images of the abdomen and pelvis in the arterial phase and portal venous phase after the uneventful administration of intravenous contrast material. Sagittal and coronal reconstructions. MIP reconstructed images were performed on an independent workstation and provided for review.   FINDINGS: NON-CONTRAST IMAGING:   Atherosclerotic calcifications are noted at the abdominal aorta and its branches. There is cholelithiasis. No obstructing ureteral calculi are identified.   POST-CONTRAST IMAGING:   Vascular:  No abdominal aortic aneurysm or acute dissection. The celiac artery, the superior mesenteric artery, the bilateral renal arteries and the inferior mesenteric artery are patent. The bilateral common iliac arteries, the bilateral internal iliac arteries, the bilateral external iliac arteries, the bilateral common femoral arteries and the visualized bilateral proximal femoral arteries are patent.     LOWER CHEST: There is mild bibasilar atelectasis. No pleural  effusion. The heart is enlarged. There is trace pericardial effusion. BONES: Multilevel degenerative changes at the spine. ABDOMINAL WALL: There is a small fat containing umbilical hernia.   ABDOMEN:   LIVER: Within normal limits. BILE DUCTS: Normal caliber. GALLBLADDER: There is cholelithiasis. PANCREAS: Within normal limits. SPLEEN: Within normal limits. ADRENALS: Within normal limits. KIDNEYS and URETERS: There is bilateral renal cortical atrophy. No hydronephrosis. There is a 1.2 cm indeterminate lesion at the right kidney.   RETROPERITONEUM: No retroperitoneal hemorrhage.   PELVIS:   REPRODUCTIVE ORGANS: No pelvic mass. BLADDER: The urinary bladder is partially distended with circumferential wall thickening. There is mild soft tissue stranding at the urinary bladder.   BOWEL: No bowel obstruction. There is mild fluid distention of small bowel loops. There is liquid stool at the colon and rectum. There is colonic diverticulosis with no CT evidence for acute diverticulitis. No intraluminal hyperdensity to suggest active GI bleeding. PERITONEUM: No ascites or free air. No pathologically enlarged lymph nodes are identified.       1. No CT angiogram evidence of active GI bleeding. No abdominal aortic aneurysm or acute dissection. 2. Liquid stool at the colon and rectum and mild fluid distention of small bowel loops, suggestive of enterocolitis. 3. Colonic diverticulosis. 4. Circumferential wall thickening at the urinary bladder and mild soft tissue stranding. Please correlate with urinalysis to evaluate for cystitis . 5. Bilateral renal cortical atrophy. 1.2 cm indeterminate lesion at the right kidney, neoplasm cannot be excluded. MRI can be obtained for further evaluation. 6. Cholelithiasis. 7. Cardiomegaly. Trace pericardial effusion.         MACRO: Critical Finding:  See findings. Notification was initiated on 1/22/2025 at 6:52 am by  Juliette Yeh.  (**-YCF-**) Instructions:   Signed by: Juliette Yeh  1/22/2025 6:57 AM Dictation workstation:   [id number]      Results for orders placed or performed during the hospital encounter of 01/22/25 (from the past 24 hours)   Sars-CoV-2 and Influenza A/B PCR   Result Value Ref Range    Flu A Result Not Detected Not Detected    Flu B Result Not Detected Not Detected    Coronavirus 2019, PCR Not Detected Not Detected   CBC and Auto Differential   Result Value Ref Range    WBC 17.2 (H) 4.4 - 11.3 x10*3/uL    nRBC 0.0 0.0 - 0.0 /100 WBCs    RBC 3.45 (L) 4.00 - 5.20 x10*6/uL    Hemoglobin 11.9 (L) 12.0 - 16.0 g/dL    Hematocrit 35.9 (L) 36.0 - 46.0 %     (H) 80 - 100 fL    MCH 34.5 (H) 26.0 - 34.0 pg    MCHC 33.1 32.0 - 36.0 g/dL    RDW 14.6 (H) 11.5 - 14.5 %    Platelets 437 150 - 450 x10*3/uL    Neutrophils % 86.7 40.0 - 80.0 %    Immature Granulocytes %, Automated 0.5 0.0 - 0.9 %    Lymphocytes % 4.2 13.0 - 44.0 %    Monocytes % 5.5 2.0 - 10.0 %    Eosinophils % 2.9 0.0 - 6.0 %    Basophils % 0.2 0.0 - 2.0 %    Neutrophils Absolute 14.88 (H) 1.60 - 5.50 x10*3/uL    Immature Granulocytes Absolute, Automated 0.09 0.00 - 0.50 x10*3/uL    Lymphocytes Absolute 0.72 (L) 0.80 - 3.00 x10*3/uL    Monocytes Absolute 0.94 (H) 0.05 - 0.80 x10*3/uL    Eosinophils Absolute 0.49 (H) 0.00 - 0.40 x10*3/uL    Basophils Absolute 0.04 0.00 - 0.10 x10*3/uL   Comprehensive Metabolic Panel   Result Value Ref Range    Glucose 122 (H) 74 - 99 mg/dL    Sodium 136 136 - 145 mmol/L    Potassium 4.7 3.5 - 5.3 mmol/L    Chloride 97 (L) 98 - 107 mmol/L    Bicarbonate 25 21 - 32 mmol/L    Anion Gap 19 10 - 20 mmol/L    Urea Nitrogen 64 (H) 6 - 23 mg/dL    Creatinine 7.05 (H) 0.50 - 1.05 mg/dL    eGFR 5 (L) >60 mL/min/1.73m*2    Calcium 9.9 8.6 - 10.3 mg/dL    Albumin 4.2 3.4 - 5.0 g/dL    Alkaline Phosphatase 249 (H) 33 - 136 U/L    Total Protein 7.2 6.4 - 8.2 g/dL    AST 25 9 - 39 U/L    Bilirubin, Total 0.4 0.0 - 1.2 mg/dL    ALT 32 7 - 45 U/L   Lipase   Result Value Ref Range    Lipase 61 9 - 82  U/L   Lactate   Result Value Ref Range    Lactate 1.4 0.4 - 2.0 mmol/L   APTT   Result Value Ref Range    aPTT 30 27 - 38 seconds   Protime-INR   Result Value Ref Range    Protime 10.9 9.8 - 12.8 seconds    INR 1.0 0.9 - 1.1   Type and Screen   Result Value Ref Range    ABO TYPE A     Rh TYPE POS     ANTIBODY SCREEN NEG    Occult Blood, Gastric Fluid    Specimen: Gastric Aspirate; Fluid   Result Value Ref Range    Occult Blood, Gastric Negative Negative    Occult blood, Gastric Fluid pH  3.0       Scheduled medications  pantoprazole, 40 mg, intravenous, BID      Continuous medications     PRN medications  PRN medications: ondansetron      Assessment/Plan   79 y.o. female with PMH of HTN, breast cancer, ESRD on HD MWF, COPD on 2 L O2 at home, HEATH on CPAP presenting with nausea, vomiting, dizziness, diarrhea that started yesterday morning around 1 AM.  Endorses LLQ abdominal pain.   GI consulted for possible enterocolitis, GIB   Etiology likely infectious enterocolitis.     Hgb stable. No hematochezia or melena. Emesis noted as brown in color.  denies bloody or coffee ground emesis.   Gastric occult negative     PLAN:   -cdiff PCR pending   -stool patho pending   -stool occult pending   -daily CBC, replace hgb <7  -IVF for hydration  -patient getting dialysis today  -continue antiemetics and analgesics as needed           ANASTASIA Faust-CNP  I saw and evaluated the patient. I personally obtained the key and critical portions of the history and physical exam or was physically present for key and critical portions performed by the NP. I reviewed the NP's documentation and discussed the patient with the NP. I agree with the NP's medical decision making as documented in the note.    Overall improvement of symptom.  Leukocytosis is a concern, rule out infectious etiology.  May need to treat with antibiotic depending on stool workup.  Cholelithiasis-incidental findings.  Macrocytosis-check B12, folate if not  checked.  Will continue to follow.

## 2025-01-22 NOTE — ED TRIAGE NOTES
Patient arrived via EMS.  States this evening she developed uncontrolled vomiting that was brown in nature and NH states it was coffee ground.  NH gave her PO Zofran without relief.

## 2025-01-22 NOTE — ED PROVIDER NOTES
History/Exam limitations: none  HPI was provided by patient    HPI:    Chief Complaint   Patient presents with    Vomiting        Joyce Bethea is a 79 y.o. female presents with chief complaint of nausea vomiting abdominal pain.  Concern for GI bleed as it was brown.  Was sent in from [place].  Is normally on 3 to 4 L nasal cannula and at that baseline.  States that symptoms began about an hour and a half ago and has been on and off with multiple times of vomiting of brown color.  She is not on any blood thinning medications.  Has dialysis Monday Wednesday Friday.  Other past medical history reviewed below.  Has pain slightly left lower quadrant of her abdomen.  Is nonradiating.  Did get Zofran prior to arrival with some improvement.     ROS: All other review of systems are negative except as noted above and HPI or ROS.   CONSTITUTIONAL: fever, chills  ENT: sore throat, congestion, rhinorrhea  CARDIOVASCULAR: chest pain, palpitations, swelling  RESPIRATORY: cough, wheeze, shortness of breath  GI: abdominal pain, nausea, vomiting, diarrhea, melena, blood in stool, black tarry stool  GENITOURINARY: dysuria, hematuria, frequency  MUSCULOSKELETAL: deformity, neck pain  SKIN: rash, color change  NEUROLOGIC: headache, numbness, focal weakness  NOTES: All systems reviewed, negative except as described above       Physical Exam:  GENERAL: Alert, oriented , cooperative,  in no acute distress.    HEAD: normocephalic, atraumatic    SKIN: Intact,  dry skin, no lesions.    EYES: PERRL, EOMs intact,  Conjunctiva pink with no erythema or exudates. No scleral icterus.     ENT: No external deformities. Nares patent, mucus membranes dry.  Pharynx clear, uvula midline.     NECK: Supple, without meningismus. Trachea at midline. No lymphadenopathy.    PULMONARY: Clear bilaterally. No crackles, rhonchi, wheezing.  No respiratory distress.  No work of breathing.    CARDIAC: Regular rate and rhythm.  Pulses 2+ and radials.  No  murmur, rub.    ABDOMEN: Soft, nontender, active bowel sounds.  No palpable organomegaly.  No rebound or guarding.  No CVA tenderness.  No pulsatile masses.    MUSCULOSKELETAL: Full range of motion throughout, no deformity.     NEUROLOGICAL:  no focal neuro deficits.  Strength 5 out of 5 throughout bilateral upper and lower extremities neurovascular intact in bilateral upper and lower extremities    PSYCHIATRIC: Appropriate mood and affect. Calm.       MDM/ED COURSE:        The patient presented for evaluation of possible GI bleed differential include but not limited to stomach ulcer, GI bleed, colitis, gastritis, Nadine-Fermin tear, anemia.  Imaging studies if performed were independently reviewed and interpreted by myself and confirmed by radiologist.  Despite receiving Zofran.  She started to vomit again so was given Reglan.  Pending imaging to result patient signed out to oncoming physician at shift change.        Note: This note was dictated by speech recognition. Minor errors in transcription may be present.    ED Course as of 01/22/25 2102 Wed Jan 22, 2025   0411 On reevaluation nausea returns will give Zofran [WL]      ED Course User Index  [WL] Loco Hair DO         Diagnoses as of 01/22/25 2102   Abdominal pain, unspecified abdominal location   Nausea and vomiting, unspecified vomiting type   Enterocolitis         Past Medical History:   Diagnosis Date    Body mass index (BMI) 31.0-31.9, adult 04/12/2019    BMI 31.0-31.9,adult    Encounter for follow-up examination after completed treatment for conditions other than malignant neoplasm 11/30/2017    Hospital discharge follow-up    Other conditions influencing health status 03/12/2019    History of cough    Other specified disorders of bone, unspecified site 03/11/2015    Abnormal bone formation    Personal history of malignant neoplasm of breast 02/05/2014    Personal history of breast cancer    Personal history of malignant neoplasm, unspecified      History of malignant neoplasm    Personal history of other diseases of the circulatory system     History of hypertension    Personal history of other diseases of the digestive system     History of gastroesophageal reflux (GERD)    Personal history of other diseases of the musculoskeletal system and connective tissue 01/21/2016    History of trigger finger    Personal history of other diseases of the musculoskeletal system and connective tissue     History of arthritis    Personal history of other diseases of the nervous system and sense organs     History of sleep apnea    Personal history of other diseases of urinary system     History of kidney disease    Respiratory failure, unspecified with hypoxia (Multi) 12/29/2017    Respiratory failure with hypoxia    Shortness of breath 10/06/2017    Exertional shortness of breath    Unspecified cataract     Cataracts, bilateral    Unspecified fracture of unspecified forearm, sequela 10/04/2018    Forearm fracture, sequela      Social History     Socioeconomic History    Marital status: Single   Tobacco Use    Smoking status: Never    Smokeless tobacco: Never   Vaping Use    Vaping status: Never Used   Substance and Sexual Activity    Alcohol use: Never    Drug use: Never     Social Drivers of Health     Financial Resource Strain: Low Risk  (1/22/2025)    Overall Financial Resource Strain (CARDIA)     Difficulty of Paying Living Expenses: Not hard at all   Food Insecurity: No Food Insecurity (1/22/2025)    Hunger Vital Sign     Worried About Running Out of Food in the Last Year: Never true     Ran Out of Food in the Last Year: Never true   Transportation Needs: No Transportation Needs (1/22/2025)    PRAPARE - Transportation     Lack of Transportation (Medical): No     Lack of Transportation (Non-Medical): No   Intimate Partner Violence: Not At Risk (1/22/2025)    Humiliation, Afraid, Rape, and Kick questionnaire     Fear of Current or Ex-Partner: No     Emotionally  Abused: No     Physically Abused: No     Sexually Abused: No   Housing Stability: Low Risk  (1/22/2025)    Housing Stability Vital Sign     Unable to Pay for Housing in the Last Year: No     Number of Times Moved in the Last Year: 0     Homeless in the Last Year: No     Current Outpatient Medications   Medication Instructions    acetaminophen (TYLENOL) 650 mg, oral, Every 8 hours PRN, Max 2000mg daily     amitriptyline (ELAVIL) 10 mg, oral, Daily    amLODIPine (NORVASC) 5 mg, oral, Daily    atorvastatin (LIPITOR) 20 mg, oral, Nightly    B complex-vitamin C-folic acid (Nephrocaps) 1 mg capsule 1 capsule, oral, Daily    Bacillus coagulans/inulin (PROBIOTIC FORMULA, INULIN, ORAL) 1 capsule, oral, Daily    calcitriol (ROCALTROL) 0.25 mcg, oral, Every Mon/Wed/Fri    carvedilol (Coreg) 25 mg tablet 1 tablet, oral, 2 times daily (morning and late afternoon)    cholecalciferol (VITAMIN D-3) 50 mcg, oral, Daily    epoetin palomo (Procrit) 2,000 unit/mL injection 1 mL, subcutaneous, Every Mon/Wed/Fri    guaiFENesin (MUCINEX) 600 mg, oral, Every 12 hours PRN    hydroxyurea (HYDREA) 500 mg, oral, Every Mon/Wed/Fri, M, W, F    loratadine (CLARITIN) 10 mg, oral, Daily PRN, In the morning    losartan (COZAAR) 100 mg, oral, Daily    meclizine (ANTIVERT) 12.5 mg, oral, 3 times daily PRN    nitroglycerin (NITROSTAT) 0.4 mg, sublingual, Every 5 min PRN    omeprazole (PRILOSEC) 20 mg, oral, Daily PRN    oxygen (O2) gas therapy Oxygen   Refills: 0       Active    sennosides-docusate sodium (Senna-S) 8.6-50 mg tablet 1 tablet, oral, Nightly    sevelamer carbonate (RENVELA) 800 mg, oral, 3 times daily (morning, midday, late afternoon), Swallow tablet whole; do not crush, break, or chew.    sodium chloride (Ocean) 0.65 % nasal spray 2 sprays, Each Nostril, Every 1 hour PRN     Allergies   Allergen Reactions    Bee Pollen Unknown    House Dust Unknown    House Dust Mite Unknown    Adhesive Tape-Silicones Rash             ED Triage Vitals    Temp Pulse Resp BP   -- -- -- --      SpO2 Temp src Heart Rate Source Patient Position   -- -- -- --      BP Location FiO2 (%)     -- --               Labs and Imaging  XR chest 1 view   Final Result   1. Mild prominence of interstitial markings in bilateral lungs. This   could be related to chronic lung parenchymal changes versus mild   interstitial edema. Recommend correlation with fluid status.   2. Possible trace bilateral pleural effusions/pleural scarring.                  MACRO:   None        Signed by: Reanna Osman 1/22/2025 4:11 PM   Dictation workstation:   [id number]      CT angio abdomen pelvis w and or wo IV IV contrast   Final Result   1. No CT angiogram evidence of active GI bleeding. No abdominal   aortic aneurysm or acute dissection.   2. Liquid stool at the colon and rectum and mild fluid distention of   small bowel loops, suggestive of enterocolitis.   3. Colonic diverticulosis.   4. Circumferential wall thickening at the urinary bladder and mild   soft tissue stranding. Please correlate with urinalysis to evaluate   for cystitis .   5. Bilateral renal cortical atrophy. 1.2 cm indeterminate lesion at   the right kidney, neoplasm cannot be excluded. MRI can be obtained   for further evaluation.   6. Cholelithiasis.   7. Cardiomegaly. Trace pericardial effusion.                       MACRO:   Critical Finding:  See findings. Notification was initiated on   1/22/2025 at 6:52 am by  Juliette Yeh.  (**-YCF-**) Instructions:        Signed by: Juliette Yeh 1/22/2025 6:57 AM   Dictation workstation:   [id number]        Labs Reviewed   CBC WITH AUTO DIFFERENTIAL - Abnormal       Result Value    WBC 17.2 (*)     nRBC 0.0      RBC 3.45 (*)     Hemoglobin 11.9 (*)     Hematocrit 35.9 (*)      (*)     MCH 34.5 (*)     MCHC 33.1      RDW 14.6 (*)     Platelets 437      Neutrophils % 86.7      Immature Granulocytes %, Automated 0.5      Lymphocytes % 4.2      Monocytes % 5.5       Eosinophils % 2.9      Basophils % 0.2      Neutrophils Absolute 14.88 (*)     Immature Granulocytes Absolute, Automated 0.09      Lymphocytes Absolute 0.72 (*)     Monocytes Absolute 0.94 (*)     Eosinophils Absolute 0.49 (*)     Basophils Absolute 0.04     COMPREHENSIVE METABOLIC PANEL - Abnormal    Glucose 122 (*)     Sodium 136      Potassium 4.7      Chloride 97 (*)     Bicarbonate 25      Anion Gap 19      Urea Nitrogen 64 (*)     Creatinine 7.05 (*)     eGFR 5 (*)     Calcium 9.9      Albumin 4.2      Alkaline Phosphatase 249 (*)     Total Protein 7.2      AST 25      Bilirubin, Total 0.4      ALT 32     LIPASE - Normal    Lipase 61      Narrative:     Venipuncture immediately after or during the administration of Metamizole may lead to falsely low results. Testing should be performed immediately prior to Metamizole dosing.   LACTATE - Normal    Lactate 1.4      Narrative:     Venipuncture immediately after or during the administration of Metamizole may lead to falsely low results. Testing should be performed immediately prior to Metamizole dosing.   SARS-COV-2 AND INFLUENZA A/B PCR - Normal    Flu A Result Not Detected      Flu B Result Not Detected      Coronavirus 2019, PCR Not Detected      Narrative:     This assay is an FDA-cleared, in vitro diagnostic nucleic acid amplification test for the qualitative detection and differentiation of SARS CoV-2/ Influenza A/B from nasopharyngeal specimens collected from individuals with signs and symptoms of respiratory tract infections, and has been validated for use at [facility]. Negative results do not preclude COVID-19/ Influenza A/B infections and should not be used as the sole basis for diagnosis, treatment, or other management decisions. Testing for SARS CoV-2 is recommended only for patients who meet current clinical and/or epidemiological criteria defined by federal, state, or local public health directives.   APTT - Normal    aPTT 30       Narrative:     The APTT is no longer used for monitoring Unfractionated Heparin Therapy. For monitoring Heparin Therapy, use the Heparin Assay.   PROTIME-INR - Normal    Protime 10.9      INR 1.0     OCCULT BLOOD, GASTRIC FLUID    Occult Blood, Gastric Negative      Occult blood, Gastric Fluid pH  3.0     STOOL PATHOGEN PANEL, PCR   C. DIFFICILE, PCR   TYPE AND SCREEN    ABO TYPE A      Rh TYPE POS      ANTIBODY SCREEN NEG     B-TYPE NATRIURETIC PEPTIDE   BASIC METABOLIC PANEL   CBC WITH AUTO DIFFERENTIAL   PHOSPHORUS   C-REACTIVE PROTEIN           Procedure  Procedures                  Loco Hair,   01/22/25 1151       Loco Hair DO  01/22/25 3207

## 2025-01-22 NOTE — PROGRESS NOTES
Pharmacy Medication History Review    Joyce Bethea is a 79 y.o. female admitted for Abdominal pain, unspecified abdominal location. Pharmacy reviewed the patient's [id number] medications and allergies for accuracy.    The list below reflectives the updated PTA list. Please review each medication in order reconciliation for additional clarification and justification.  Prior to Admission Medications   Prescriptions Last Dose Informant Patient Reported? Taking?   B complex-vitamin C-folic acid (Nephrocaps) 1 mg capsule 1/21/2025 at  9:00 AM Other Yes Yes   Sig: Take 1 capsule by mouth once daily.   Bacillus coagulans/inulin (PROBIOTIC FORMULA, INULIN, ORAL) Unknown Other Yes Yes   Sig: Take 1 capsule by mouth once daily.   acetaminophen (Tylenol) 325 mg tablet Unknown Other Yes Unknown   Sig: Take 2 tablets (650 mg) by mouth every 8 hours if needed for mild pain (1 - 3), headaches or fever (temp greater than 38.0 C). Max 2000mg daily   amLODIPine (Norvasc) 5 mg tablet 1/21/2025 at  9:00 AM Other No Yes   Sig: Take 1 tablet (5 mg) by mouth once daily.   amitriptyline (Elavil) 10 mg tablet 1/21/2025 Evening Other Yes Yes   Sig: Take 1 tablet (10 mg) by mouth once daily.   atorvastatin (Lipitor) 20 mg tablet 1/21/2025 Evening Other No Yes   Sig: Take 1 tablet (20 mg) by mouth once daily at bedtime.   calcitriol (Rocaltrol) 0.25 mcg capsule 1/21/2025 at  9:00 AM Other Yes Yes   Sig: Take 1 capsule (0.25 mcg) by mouth once a day on Monday, Wednesday, and Friday.   carvedilol (Coreg) 25 mg tablet 1/21/2025 at  9:00 PM Other Yes Yes   Sig: Take 1 tablet (25 mg) by mouth 2 times daily (morning and late afternoon).   cholecalciferol (Vitamin D-3) 50 mcg (2,000 unit) capsule Unknown Other Yes Unknown   Sig: Take 1 capsule (50 mcg) by mouth early in the morning..   epoetin palomo (Procrit) 2,000 unit/mL injection 1/20/2025 at  9:00 AM Other Yes Yes   Sig: Inject 1 mL (2,000 Units) under the skin once a day on Monday,  Wednesday, and Friday.   guaiFENesin (Mucinex) 600 mg 12 hr tablet Unknown Other Yes Unknown   Sig: Take 1 tablet (600 mg) by mouth every 12 hours if needed for cough or congestion.   hydroxyurea (Hydrea) 500 mg capsule 1/20/2025 at  9:00 AM Other No Unknown   Sig: Take 1 capsule (500 mg total) by mouth once a day on Monday, Wednesday, and Friday.  M, W, F   loratadine (Claritin) 10 mg tablet Unknown Other Yes Unknown   Sig: Take 1 tablet (10 mg) by mouth once daily as needed for allergies. In the morning   losartan (Cozaar) 25 mg tablet 1/21/2025 at  9:00 AM Other Yes Yes   Sig: Take 4 tablets (100 mg) by mouth once daily.   meclizine (Antivert) 12.5 mg tablet Unknown Other No Unknown   Sig: Take 1 tablet (12.5 mg) by mouth 3 times a day as needed for dizziness.   nitroglycerin (Nitrostat) 0.4 mg SL tablet Unknown Other Yes Unknown   Sig: Place 1 tablet (0.4 mg) under the tongue every 5 minutes if needed for chest pain.   omeprazole (PriLOSEC) 20 mg DR capsule  Other Yes Unknown   Sig: Take 1 capsule (20 mg) by mouth once daily as needed.   oxygen (O2) gas therapy  Other     Sig: Oxygen   Refills: 0       Active   sennosides-docusate sodium (Senna-S) 8.6-50 mg tablet 1/21/2025 at  9:00 PM Other Yes Yes   Sig: Take 1 tablet by mouth once daily at bedtime.   sevelamer carbonate (Renvela) 800 mg tablet Not Taking  No Unknown   Sig: Take 1 tablet (800 mg) by mouth 3 times daily (morning, midday, late afternoon). Swallow tablet whole; do not crush, break, or chew.   Patient not taking: Reported on 1/22/2025   sodium chloride (Ocean) 0.65 % nasal spray Unknown Other Yes Unknown   Sig: Administer 2 sprays into each nostril every 1 hour if needed for congestion.      Facility-Administered Medications: None           The list below reflectives the updated allergy list. Please review each documented allergy for additional clarification and justification.  Allergies  Reviewed by Sadie Plunkett RN on 1/22/2025        Severity  Reactions Comments    Bee Pollen Not Specified Unknown     House Dust Not Specified Unknown     House Dust Mite Not Specified Unknown     Adhesive Tape-silicones Low Rash             Below are additional concerns with the patient's PTA list.  Patient from facility, was sent with MAR, medications above that are listed as unknown are PRN meds that do not have times on the MAR.    ROCHELLE JOE

## 2025-01-22 NOTE — H&P
History Of Present Illness  Joyce Bethea is a 79 y.o. female presenting with nausea, vomiting, abd pain and diarrhea, all of which started in early morning hours. The vomiting came suddenly, she had multiple bouts, dark brown in color, and then began having multiple bowel movements, which started as formed and became diarrhea. No blood in stool. She got progressively weaker. She has some lower abdominal discomfort, kind of sharp stabbing and brief. No fever or chills. She has not been on antibiotics in last couple months. Last night had some increased heartburn, but otherwise felt ok. CT shows liquid stool and enterocolitis.     Past Medical History  Chronic respiratory failure, on 3-4 liters  Hypertension  Breast cancer, treated with surgery and chemo  GERD  ESRD, dialysis dependent, mwf  DJD, severe  Essential thrombocytosis  Anemia    Surgical History  Bilateral mastectomy  Rotator cuff repair  Knee arthroscopy  Right LUE av fistula  colonoscopy     Social History  She reports that she has never smoked. She has never used smokeless tobacco. She reports that she does not drink alcohol and does not use drugs.  She uses both a walker and a cane  Family History  DM  CVA  Hypertension  Cancer--breast , colon, prostate and leukemia     Allergies  Bee pollen, House dust, House dust mite, and Adhesive tape-silicones    Review of Systems   Constitutional:  Positive for activity change, appetite change and fatigue.   HENT:  Positive for congestion, nosebleeds, postnasal drip and rhinorrhea.    Eyes: Negative.    Respiratory:  Positive for cough.    Cardiovascular: Negative.    Gastrointestinal:  Positive for abdominal pain, diarrhea, nausea and vomiting.   Endocrine: Negative.    Musculoskeletal:  Positive for arthralgias, back pain, gait problem, myalgias, neck pain and neck stiffness.   Skin: Negative.    Allergic/Immunologic: Negative.    Neurological:  Positive for dizziness, tremors and weakness.    Hematological: Negative.    Psychiatric/Behavioral: Negative.          Physical Exam  Constitutional:       Appearance: Normal appearance.   HENT:      Head: Normocephalic.      Nose: Nose normal.      Mouth/Throat:      Mouth: Mucous membranes are dry.   Eyes:      Extraocular Movements: Extraocular movements intact.      Pupils: Pupils are equal, round, and reactive to light.   Neck:      Comments: No jvd, bruits, or adenopathy  Cardiovascular:      Pulses: Normal pulses.      Comments: Regular, 2/6 blowing systolic murmur. S4  Pulmonary:      Comments: Clear, decreased breath sounds in bases  Abdominal:      Comments: Soft. Hyperactive bowel sounds  Tender diffusely, especially across lower abdomen and in periumbilical area  Slight guarding noted   Musculoskeletal:      Comments: No edema. Pulses equal  Severe djd changes   Skin:     General: Skin is warm and dry.   Neurological:      Mental Status: She is alert and oriented to person, place, and time. Mental status is at baseline.   Psychiatric:         Mood and Affect: Mood normal.         Behavior: Behavior normal.       Results for orders placed or performed during the hospital encounter of 01/22/25 (from the past 24 hours)   Sars-CoV-2 and Influenza A/B PCR   Result Value Ref Range    Flu A Result Not Detected Not Detected    Flu B Result Not Detected Not Detected    Coronavirus 2019, PCR Not Detected Not Detected   CBC and Auto Differential   Result Value Ref Range    WBC 17.2 (H) 4.4 - 11.3 x10*3/uL    nRBC 0.0 0.0 - 0.0 /100 WBCs    RBC 3.45 (L) 4.00 - 5.20 x10*6/uL    Hemoglobin 11.9 (L) 12.0 - 16.0 g/dL    Hematocrit 35.9 (L) 36.0 - 46.0 %     (H) 80 - 100 fL    MCH 34.5 (H) 26.0 - 34.0 pg    MCHC 33.1 32.0 - 36.0 g/dL    RDW 14.6 (H) 11.5 - 14.5 %    Platelets 437 150 - 450 x10*3/uL    Neutrophils % 86.7 40.0 - 80.0 %    Immature Granulocytes %, Automated 0.5 0.0 - 0.9 %    Lymphocytes % 4.2 13.0 - 44.0 %    Monocytes % 5.5 2.0 - 10.0 %     Eosinophils % 2.9 0.0 - 6.0 %    Basophils % 0.2 0.0 - 2.0 %    Neutrophils Absolute 14.88 (H) 1.60 - 5.50 x10*3/uL    Immature Granulocytes Absolute, Automated 0.09 0.00 - 0.50 x10*3/uL    Lymphocytes Absolute 0.72 (L) 0.80 - 3.00 x10*3/uL    Monocytes Absolute 0.94 (H) 0.05 - 0.80 x10*3/uL    Eosinophils Absolute 0.49 (H) 0.00 - 0.40 x10*3/uL    Basophils Absolute 0.04 0.00 - 0.10 x10*3/uL   Comprehensive Metabolic Panel   Result Value Ref Range    Glucose 122 (H) 74 - 99 mg/dL    Sodium 136 136 - 145 mmol/L    Potassium 4.7 3.5 - 5.3 mmol/L    Chloride 97 (L) 98 - 107 mmol/L    Bicarbonate 25 21 - 32 mmol/L    Anion Gap 19 10 - 20 mmol/L    Urea Nitrogen 64 (H) 6 - 23 mg/dL    Creatinine 7.05 (H) 0.50 - 1.05 mg/dL    eGFR 5 (L) >60 mL/min/1.73m*2    Calcium 9.9 8.6 - 10.3 mg/dL    Albumin 4.2 3.4 - 5.0 g/dL    Alkaline Phosphatase 249 (H) 33 - 136 U/L    Total Protein 7.2 6.4 - 8.2 g/dL    AST 25 9 - 39 U/L    Bilirubin, Total 0.4 0.0 - 1.2 mg/dL    ALT 32 7 - 45 U/L   Lipase   Result Value Ref Range    Lipase 61 9 - 82 U/L   Lactate   Result Value Ref Range    Lactate 1.4 0.4 - 2.0 mmol/L   APTT   Result Value Ref Range    aPTT 30 27 - 38 seconds   Protime-INR   Result Value Ref Range    Protime 10.9 9.8 - 12.8 seconds    INR 1.0 0.9 - 1.1   Type and Screen   Result Value Ref Range    ABO TYPE A     Rh TYPE POS     ANTIBODY SCREEN NEG    Occult Blood, Gastric Fluid    Specimen: Gastric Aspirate; Fluid   Result Value Ref Range    Occult Blood, Gastric Negative Negative    Occult blood, Gastric Fluid pH  3.0           Last Recorded Vitals  Blood pressure 166/66, pulse 89, temperature 36.6 °C (97.9 °F), resp. rate 18, height 1.524 m (5'), weight 76.7 kg (169 lb), SpO2 99%.    Relevant Results    Assessment/Plan   Assessment & Plan  Abdominal pain, unspecified abdominal location    Abdominal pain    Nausea and vomiting    Enterocolitis    Vomiting/abd pain/diarrhea. Sending stool samples for studies, suspect she may  have norovirus. No recent ATB use to suggest c diff, but also checking for this. Will order liquid diet, zofran etc. Gi was asked to see in ER  Dehydration. Clinically she is dry, will gently hydrate overnight  ESRD, dialysis dependent. Nephro aware of need for dialysis  Hypertension, continue usual meds  GERD. She stopped her prilosec about a year ago, will resume PPI and advise to use at dc  Hx breast cancer, bilateral  Essential thrombocytosis. Continue hydroxyurea       I spent 41 minutes in the professional and overall care of this patient.      Camille Zepeda MD

## 2025-01-22 NOTE — PROGRESS NOTES
This was a signout on January 22, 2025.  The patient CT findings show enterocolitis.  There is already an active Protonix drip.  GI is comfortable keeping this patient here.  We will continue management and treatment and hospitalize for further workup.    Ninoska Fernández, DO

## 2025-01-22 NOTE — PROGRESS NOTES
01/22/25 0848   Discharge Planning   Living Arrangements Other (Comment)  (from [facility])   Support Systems [employer]/jami community;Friends/neighbors   Assistance Needed A&OX4; independent/assist at [employer] for ADLs with cane and walker; doesn't drive; O2 baseline 2.5 @ rest 3L exertion([employer])-currently 2.5L NC; PCP Rosanna Barger CNP; dialysis MWF 9am at [facility]   Type of Residence Skilled nursing facility   Do you have animals or pets at home? No   Who is requesting discharge planning? Provider   Home or Post Acute Services Post acute facilities (Rehab/SNF/etc)   Expected Discharge Disposition SNF  (Patient from [facility] skilled side. Can return when ready no precert)   Does the patient need discharge transport arranged? Yes   RoundTrip coordination needed? Yes   Has discharge transport been arranged? No   Financial Resource Strain   How hard is it for you to pay for the very basics like food, housing, medical care, and heating? Not hard   Housing Stability   In the last 12 months, was there a time when you were not able to pay the mortgage or rent on time? N   In the past 12 months, how many times have you moved where you were living? 0   At any time in the past 12 months, were you homeless or living in a shelter (including now)? N   Transportation Needs   In the past 12 months, has lack of transportation kept you from medical appointments or from getting medications? no   In the past 12 months, has lack of transportation kept you from meetings, work, or from getting things needed for daily living? No   Patient Choice   Patient / Family choosing to utilize agency / facility established prior to hospitalization Yes   Intensity of Service   Intensity of Service 0-30 min     01/22/2025 0851am  Spoke with patient bedside in ED

## 2025-01-23 ENCOUNTER — APPOINTMENT (OUTPATIENT)
Dept: DIALYSIS | Facility: HOSPITAL | Age: 80
End: 2025-01-23
Payer: MEDICARE

## 2025-01-23 LAB
ANION GAP SERPL CALC-SCNC: 21 MMOL/L (ref 10–20)
BASOPHILS # BLD AUTO: 0.02 X10*3/UL (ref 0–0.1)
BASOPHILS NFR BLD AUTO: 0.3 %
BNP SERPL-MCNC: 553 PG/ML (ref 0–99)
BUN SERPL-MCNC: 88 MG/DL (ref 6–23)
C COLI+JEJ+UPSA DNA STL QL NAA+PROBE: NOT DETECTED
C DIF TOX TCDA+TCDB STL QL NAA+PROBE: DETECTED
C DIFF TOX A+B STL QL IA: NEGATIVE
CALCIUM SERPL-MCNC: 8.2 MG/DL (ref 8.6–10.3)
CHLORIDE SERPL-SCNC: 99 MMOL/L (ref 98–107)
CO2 SERPL-SCNC: 19 MMOL/L (ref 21–32)
CREAT SERPL-MCNC: 9.08 MG/DL (ref 0.5–1.05)
CRP SERPL-MCNC: 2.77 MG/DL
EC STX1 GENE STL QL NAA+PROBE: NOT DETECTED
EC STX2 GENE STL QL NAA+PROBE: NOT DETECTED
EGFRCR SERPLBLD CKD-EPI 2021: 4 ML/MIN/1.73M*2
EOSINOPHIL # BLD AUTO: 0.1 X10*3/UL (ref 0–0.4)
EOSINOPHIL NFR BLD AUTO: 1.3 %
ERYTHROCYTE [DISTWIDTH] IN BLOOD BY AUTOMATED COUNT: 14.5 % (ref 11.5–14.5)
GLUCOSE SERPL-MCNC: 92 MG/DL (ref 74–99)
HBV SURFACE AB SER-ACNC: 459.9 MIU/ML
HBV SURFACE AG SERPL QL IA: NONREACTIVE
HCT VFR BLD AUTO: 29 % (ref 36–46)
HGB BLD-MCNC: 9.3 G/DL (ref 12–16)
IMM GRANULOCYTES # BLD AUTO: 0.08 X10*3/UL (ref 0–0.5)
IMM GRANULOCYTES NFR BLD AUTO: 1 % (ref 0–0.9)
LYMPHOCYTES # BLD AUTO: 1.15 X10*3/UL (ref 0.8–3)
LYMPHOCYTES NFR BLD AUTO: 14.4 %
MCH RBC QN AUTO: 33.2 PG (ref 26–34)
MCHC RBC AUTO-ENTMCNC: 32.1 G/DL (ref 32–36)
MCV RBC AUTO: 104 FL (ref 80–100)
MONOCYTES # BLD AUTO: 0.93 X10*3/UL (ref 0.05–0.8)
MONOCYTES NFR BLD AUTO: 11.7 %
NEUTROPHILS # BLD AUTO: 5.68 X10*3/UL (ref 1.6–5.5)
NEUTROPHILS NFR BLD AUTO: 71.3 %
NOROVIRUS GI + GII RNA STL NAA+PROBE: DETECTED
NRBC BLD-RTO: 0 /100 WBCS (ref 0–0)
PHOSPHATE SERPL-MCNC: 5.2 MG/DL (ref 2.5–4.9)
PLATELET # BLD AUTO: 353 X10*3/UL (ref 150–450)
POTASSIUM SERPL-SCNC: 5 MMOL/L (ref 3.5–5.3)
RBC # BLD AUTO: 2.8 X10*6/UL (ref 4–5.2)
RV RNA STL NAA+PROBE: NOT DETECTED
SALMONELLA DNA STL QL NAA+PROBE: NOT DETECTED
SHIGELLA DNA SPEC QL NAA+PROBE: NOT DETECTED
SODIUM SERPL-SCNC: 134 MMOL/L (ref 136–145)
V CHOLERAE DNA STL QL NAA+PROBE: NOT DETECTED
WBC # BLD AUTO: 8 X10*3/UL (ref 4.4–11.3)
Y ENTEROCOL DNA STL QL NAA+PROBE: NOT DETECTED

## 2025-01-23 PROCEDURE — 84100 ASSAY OF PHOSPHORUS: CPT | Performed by: INTERNAL MEDICINE

## 2025-01-23 PROCEDURE — 80048 BASIC METABOLIC PNL TOTAL CA: CPT | Performed by: INTERNAL MEDICINE

## 2025-01-23 PROCEDURE — 2500000005 HC RX 250 GENERAL PHARMACY W/O HCPCS: Performed by: INTERNAL MEDICINE

## 2025-01-23 PROCEDURE — 36415 COLL VENOUS BLD VENIPUNCTURE: CPT | Performed by: INTERNAL MEDICINE

## 2025-01-23 PROCEDURE — 83880 ASSAY OF NATRIURETIC PEPTIDE: CPT | Performed by: INTERNAL MEDICINE

## 2025-01-23 PROCEDURE — 2500000001 HC RX 250 WO HCPCS SELF ADMINISTERED DRUGS (ALT 637 FOR MEDICARE OP): Performed by: INTERNAL MEDICINE

## 2025-01-23 PROCEDURE — 87340 HEPATITIS B SURFACE AG IA: CPT | Mod: GEALAB | Performed by: INTERNAL MEDICINE

## 2025-01-23 PROCEDURE — 86140 C-REACTIVE PROTEIN: CPT | Performed by: INTERNAL MEDICINE

## 2025-01-23 PROCEDURE — 99232 SBSQ HOSP IP/OBS MODERATE 35: CPT | Performed by: INTERNAL MEDICINE

## 2025-01-23 PROCEDURE — 8010000001 HC DIALYSIS - HEMODIALYSIS PER DAY

## 2025-01-23 PROCEDURE — 99233 SBSQ HOSP IP/OBS HIGH 50: CPT | Performed by: INTERNAL MEDICINE

## 2025-01-23 PROCEDURE — 2500000004 HC RX 250 GENERAL PHARMACY W/ HCPCS (ALT 636 FOR OP/ED): Performed by: INTERNAL MEDICINE

## 2025-01-23 PROCEDURE — 86706 HEP B SURFACE ANTIBODY: CPT | Mod: GEALAB | Performed by: INTERNAL MEDICINE

## 2025-01-23 PROCEDURE — 1200000002 HC GENERAL ROOM WITH TELEMETRY DAILY

## 2025-01-23 PROCEDURE — 2500000002 HC RX 250 W HCPCS SELF ADMINISTERED DRUGS (ALT 637 FOR MEDICARE OP, ALT 636 FOR OP/ED): Performed by: INTERNAL MEDICINE

## 2025-01-23 PROCEDURE — 2500000001 HC RX 250 WO HCPCS SELF ADMINISTERED DRUGS (ALT 637 FOR MEDICARE OP): Performed by: NURSE PRACTITIONER

## 2025-01-23 PROCEDURE — 99223 1ST HOSP IP/OBS HIGH 75: CPT | Performed by: INTERNAL MEDICINE

## 2025-01-23 PROCEDURE — 5A1D70Z PERFORMANCE OF URINARY FILTRATION, INTERMITTENT, LESS THAN 6 HOURS PER DAY: ICD-10-PCS | Performed by: RADIOLOGY

## 2025-01-23 PROCEDURE — 85025 COMPLETE CBC W/AUTO DIFF WBC: CPT | Performed by: INTERNAL MEDICINE

## 2025-01-23 RX ORDER — VANCOMYCIN HYDROCHLORIDE 125 MG/1
125 CAPSULE ORAL EVERY 6 HOURS SCHEDULED
Status: DISCONTINUED | OUTPATIENT
Start: 2025-01-23 | End: 2025-01-25

## 2025-01-23 RX ORDER — CALCIUM CARBONATE 200(500)MG
1500 TABLET,CHEWABLE ORAL 3 TIMES DAILY
Status: DISCONTINUED | OUTPATIENT
Start: 2025-01-23 | End: 2025-01-28 | Stop reason: HOSPADM

## 2025-01-23 RX ORDER — PANTOPRAZOLE SODIUM 40 MG/1
40 TABLET, DELAYED RELEASE ORAL
Status: DISCONTINUED | OUTPATIENT
Start: 2025-01-23 | End: 2025-01-28 | Stop reason: HOSPADM

## 2025-01-23 RX ORDER — AMITRIPTYLINE HYDROCHLORIDE 10 MG/1
10 TABLET, FILM COATED ORAL NIGHTLY
Status: DISCONTINUED | OUTPATIENT
Start: 2025-01-23 | End: 2025-01-28 | Stop reason: HOSPADM

## 2025-01-23 RX ADMIN — LOSARTAN POTASSIUM 100 MG: 50 TABLET, FILM COATED ORAL at 11:07

## 2025-01-23 RX ADMIN — CALCIUM CARBONATE 1500 MG: 500 TABLET, CHEWABLE ORAL at 15:17

## 2025-01-23 RX ADMIN — Medication 2.5 L/MIN: at 11:08

## 2025-01-23 RX ADMIN — SODIUM CHLORIDE 70 ML/HR: 9 INJECTION, SOLUTION INTRAVENOUS at 04:05

## 2025-01-23 RX ADMIN — ATORVASTATIN CALCIUM 20 MG: 20 TABLET, FILM COATED ORAL at 21:14

## 2025-01-23 RX ADMIN — HEPARIN SODIUM 2500 UNITS: 5000 INJECTION, SOLUTION INTRAVENOUS; SUBCUTANEOUS at 22:18

## 2025-01-23 RX ADMIN — AMLODIPINE BESYLATE 5 MG: 5 TABLET ORAL at 11:07

## 2025-01-23 RX ADMIN — HEPARIN SODIUM 2500 UNITS: 5000 INJECTION, SOLUTION INTRAVENOUS; SUBCUTANEOUS at 14:50

## 2025-01-23 RX ADMIN — CARVEDILOL 25 MG: 25 TABLET, FILM COATED ORAL at 17:30

## 2025-01-23 RX ADMIN — PANTOPRAZOLE SODIUM 40 MG: 40 TABLET, DELAYED RELEASE ORAL at 15:17

## 2025-01-23 RX ADMIN — Medication 5 MG: at 21:28

## 2025-01-23 RX ADMIN — HEPARIN SODIUM 2500 UNITS: 5000 INJECTION, SOLUTION INTRAVENOUS; SUBCUTANEOUS at 05:52

## 2025-01-23 RX ADMIN — CARVEDILOL 25 MG: 25 TABLET, FILM COATED ORAL at 11:07

## 2025-01-23 RX ADMIN — AMITRIPTYLINE HYDROCHLORIDE 10 MG: 10 TABLET, FILM COATED ORAL at 21:14

## 2025-01-23 RX ADMIN — CALCIUM CARBONATE 1500 MG: 500 TABLET, CHEWABLE ORAL at 21:14

## 2025-01-23 RX ADMIN — Medication 1 CAPSULE: at 14:50

## 2025-01-23 RX ADMIN — VANCOMYCIN HYDROCHLORIDE 125 MG: 125 CAPSULE ORAL at 17:30

## 2025-01-23 ASSESSMENT — COGNITIVE AND FUNCTIONAL STATUS - GENERAL
DAILY ACTIVITIY SCORE: 20
WALKING IN HOSPITAL ROOM: A LOT
CLIMB 3 TO 5 STEPS WITH RAILING: A LOT
STANDING UP FROM CHAIR USING ARMS: A LITTLE
TOILETING: A LITTLE
MOBILITY SCORE: 17
DRESSING REGULAR LOWER BODY CLOTHING: A LITTLE
CLIMB 3 TO 5 STEPS WITH RAILING: A LOT
DRESSING REGULAR UPPER BODY CLOTHING: A LITTLE
DAILY ACTIVITIY SCORE: 20
DRESSING REGULAR LOWER BODY CLOTHING: A LITTLE
TOILETING: A LITTLE
TURNING FROM BACK TO SIDE WHILE IN FLAT BAD: A LITTLE
WALKING IN HOSPITAL ROOM: A LOT
STANDING UP FROM CHAIR USING ARMS: A LITTLE
TURNING FROM BACK TO SIDE WHILE IN FLAT BAD: A LITTLE
MOVING TO AND FROM BED TO CHAIR: A LITTLE
MOBILITY SCORE: 17
DRESSING REGULAR UPPER BODY CLOTHING: A LITTLE
MOVING TO AND FROM BED TO CHAIR: A LITTLE
HELP NEEDED FOR BATHING: A LITTLE
HELP NEEDED FOR BATHING: A LITTLE

## 2025-01-23 ASSESSMENT — PAIN SCALES - GENERAL
PAINLEVEL_OUTOF10: 0 - NO PAIN

## 2025-01-23 ASSESSMENT — PAIN - FUNCTIONAL ASSESSMENT
PAIN_FUNCTIONAL_ASSESSMENT: 0-10
PAIN_FUNCTIONAL_ASSESSMENT: 0-10

## 2025-01-23 NOTE — CONSULTS
NEPHROLOGY NEW CONSULT NOTE   Patient ID: Joyce Behtea is a 79 y.o. female.     Reason for consult: ESRD-HD    Chief Complaint   Patient presents with    Vomiting        HPI  Joyce Bethea is a 79 y.o. female   - With past medical Hx as below   - Admitted for vomiting and diarrhea/norovirus?  C. difficile  - Nephrology was consulted for ESRD management     Past Medical History:   Diagnosis Date    Body mass index (BMI) 31.0-31.9, adult 04/12/2019    BMI 31.0-31.9,adult    Encounter for follow-up examination after completed treatment for conditions other than malignant neoplasm 11/30/2017    Hospital discharge follow-up    Other conditions influencing health status 03/12/2019    History of cough    Other specified disorders of bone, unspecified site 03/11/2015    Abnormal bone formation    Personal history of malignant neoplasm of breast 02/05/2014    Personal history of breast cancer    Personal history of malignant neoplasm, unspecified     History of malignant neoplasm    Personal history of other diseases of the circulatory system     History of hypertension    Personal history of other diseases of the digestive system     History of gastroesophageal reflux (GERD)    Personal history of other diseases of the musculoskeletal system and connective tissue 01/21/2016    History of trigger finger    Personal history of other diseases of the musculoskeletal system and connective tissue     History of arthritis    Personal history of other diseases of the nervous system and sense organs     History of sleep apnea    Personal history of other diseases of urinary system     History of kidney disease    Respiratory failure, unspecified with hypoxia (Multi) 12/29/2017    Respiratory failure with hypoxia    Shortness of breath 10/06/2017    Exertional shortness of breath    Unspecified cataract     Cataracts, bilateral    Unspecified fracture of unspecified forearm, sequela 10/04/2018    Forearm fracture,  sequela      Past Surgical History:   Procedure Laterality Date    COLONOSCOPY  02/05/2014    Colonoscopy (Fiberoptic)    IR CVC TUNNELED  10/12/2021    IR CVC TUNNELED 10/12/2021 [facility]    KNEE ARTHROSCOPY W/ MENISCAL REPAIR  04/01/2014    Knee Arthroscopy With Medial Meniscus Repair    MASTECTOMY  02/05/2014    Breast Surgery Mastectomy    MR HEAD ANGIO WO IV CONTRAST  2/23/2019    MR HEAD ANGIO WO IV CONTRAST 2/23/2019 [facility]    MR NECK ANGIO WO IV CONTRAST  2/23/2019    MR NECK ANGIO WO IV CONTRAST 2/23/2019 [facility]    OTHER SURGICAL HISTORY  09/21/2021    Arteriovenous fistula creation procedure    ROTATOR CUFF REPAIR  04/01/2014    Rotator Cuff Repair    TONSILLECTOMY  02/05/2014    Tonsillectomy      Family History   Problem Relation Name Age of Onset    Hypertension Mother      Leukemia Mother      Osteoarthritis Mother      Colon cancer Father      Diabetes Father      Hypertension Father      Osteoarthritis Sister      Osteoarthritis Other Aunt     Stroke Other Aunt     Stroke Other Uncle     Breast cancer Other Family History      Social History     Socioeconomic History    Marital status: Single     Spouse name: Not on file    Number of children: Not on file    Years of education: Not on file    Highest education level: Not on file   Occupational History    Not on file   Tobacco Use    Smoking status: Never    Smokeless tobacco: Never   Vaping Use    Vaping status: Never Used   Substance and Sexual Activity    Alcohol use: Never    Drug use: Never    Sexual activity: Not on file   Other Topics Concern    Not on file   Social History Narrative    Not on file     Social Drivers of Health     Financial Resource Strain: Low Risk  (1/22/2025)    Overall Financial Resource Strain (CARDIA)     Difficulty of Paying Living Expenses: Not hard at all   Food Insecurity: No Food Insecurity (1/22/2025)    Hunger Vital Sign     Worried About Running Out of Food in the Last Year:  Never true     Ran Out of Food in the Last Year: Never true   Transportation Needs: No Transportation Needs (1/22/2025)    PRAPARE - Transportation     Lack of Transportation (Medical): No     Lack of Transportation (Non-Medical): No   Physical Activity: Not on file   Stress: Not on file   Social Connections: Not on file   Intimate Partner Violence: Not At Risk (1/22/2025)    Humiliation, Afraid, Rape, and Kick questionnaire     Fear of Current or Ex-Partner: No     Emotionally Abused: No     Physically Abused: No     Sexually Abused: No   Housing Stability: Low Risk  (1/22/2025)    Housing Stability Vital Sign     Unable to Pay for Housing in the Last Year: No     Number of Times Moved in the Last Year: 0     Homeless in the Last Year: No     Allergies   Allergen Reactions    Bee Pollen Unknown    House Dust Unknown    House Dust Mite Unknown    Adhesive Tape-Silicones Rash        Scheduled medications  amitriptyline, 10 mg, oral, Nightly  amLODIPine, 5 mg, oral, Daily  atorvastatin, 20 mg, oral, Nightly  calcitriol, 0.25 mcg, oral, Every Mon/Wed/Fri  calcium carbonate, 1,500 mg, oral, TID  carvedilol, 25 mg, oral, BID  heparin, 2,500 Units, subcutaneous, q8h  hydroxyurea, 500 mg, oral, Every Mon/Wed/Fri  losartan, 100 mg, oral, Daily  oxygen, , inhalation, Continuous - Inhalation  pantoprazole, 40 mg, oral, BID AC      Continuous medications  sodium chloride 0.9%, 70 mL/hr, Last Rate: 70 mL/hr (01/23/25 0405)      PRN medications  PRN medications: acetaminophen, alum-mag hydroxide-simeth, benzonatate, guaiFENesin, melatonin, ondansetron, sodium chloride   Meds:   amitriptyline, 10 mg, Nightly  amLODIPine, 5 mg, Daily  atorvastatin, 20 mg, Nightly  calcitriol, 0.25 mcg, Every Mon/Wed/Fri  calcium carbonate, 1,500 mg, TID  carvedilol, 25 mg, BID  heparin, 2,500 Units, q8h  hydroxyurea, 500 mg, Every Mon/Wed/Fri  losartan, 100 mg, Daily  oxygen, , Continuous - Inhalation  pantoprazole, 40 mg, BID AC      sodium  chloride 0.9%, Last Rate: 70 mL/hr (01/23/25 0405)      acetaminophen, 650 mg, q8h PRN  alum-mag hydroxide-simeth, 10 mL, 4x daily PRN  benzonatate, 100 mg, 4x daily PRN  guaiFENesin, 600 mg, q12h PRN  melatonin, 5 mg, Nightly PRN  ondansetron, 4 mg, q4h PRN  sodium chloride, 2 spray, q1h PRN        Heart Rate:  [78-92]   Temp:  [36.6 °C (97.9 °F)-37 °C (98.6 °F)]   Resp:  [17-18]   BP: (136-166)/(56-66)   SpO2:  [96 %-99 %]    Weight: 76.7 kg (169 lb)       General appearance: Awake and alert, oriented, . No distress, on supplemental oxygen  HEENT: supple, moist oral mucosa, no mouth ulcers  Neck: No JVD  Skin: no apparent rash  Heart: heart sounds 1 & 2 present and normal, no murmurs heard or friction rub  Lungs: Adequate air entry,  no wheezing/crackles  Abdomen: soft, non tender, no masses palpated, no flank tenderness  Extremities: No  edema, no joint swelling,  : deferred  Neuro: No FND, no asterixis   ACCESS: AV fistula with no issues    Results from last 72 hours   Lab Units 01/23/25  0607 01/22/25  0431   SODIUM mmol/L 134* 136   POTASSIUM mmol/L 5.0 4.7   CO2 mmol/L 19* 25   BUN mg/dL 88* 64*   CREATININE mg/dL 9.08* 7.05*   PHOSPHORUS mg/dL 5.2*  --    CALCIUM mg/dL 8.2* 9.9   ALBUMIN g/dL  --  4.2   GLUCOSE mg/dL 92 122*   WBC AUTO x10*3/uL 8.0 17.2*        Results from last 7 days   Lab Units 01/23/25  0607 01/22/25  0431   WBC AUTO x10*3/uL 8.0 17.2*   HEMOGLOBIN g/dL 9.3* 11.9*   HEMATOCRIT % 29.0* 35.9*   PLATELETS AUTO x10*3/uL 353 437       amitriptyline, 10 mg, oral, Nightly  amLODIPine, 5 mg, oral, Daily  atorvastatin, 20 mg, oral, Nightly  calcitriol, 0.25 mcg, oral, Every Mon/Wed/Fri  calcium carbonate, 1,500 mg, oral, TID  carvedilol, 25 mg, oral, BID  heparin, 2,500 Units, subcutaneous, q8h  hydroxyurea, 500 mg, oral, Every Mon/Wed/Fri  losartan, 100 mg, oral, Daily  oxygen, , inhalation, Continuous - Inhalation  pantoprazole, 40 mg, oral, BID AC      A/P  ESRD-HD admitted with vomiting,  diarrhea, norovirus.  Missed dialysis yesterday.  Plan HD per submitted orders today, no UF   in the setting of active diarrhea.  Will continue Monday Wednesday Friday schedule-due tomorrow     MBD -phosphorus is accepted 5.  Continue calcium carbonate    # Anemia of CKD: EPO to be given per protocol as an outpatient        Access: AV fistula no issues   BP: acceptable during treatment   Renal Diet   Daily renal MVI   Continue 3 x per week hemodialysis; SW to ensure availability of o/p HD chair at discharge  Please communicate any antibiotic needs prior to discharge directly with the dialysis unit     Patient is able to eat and drink okay.  No need for IV fluid from kidney standpoint.  Will run dry session today with no fluid removal.  Will reassess tomorrow for UF     Okay to discharge from kidney standpoint.  Continue outpatient hemodialysis schedule tomorrow as planned.  She is Dr. Camara patient at [facility]

## 2025-01-23 NOTE — PROGRESS NOTES
Joyce Bethea is a 79 y.o. female on day 1 of admission presenting with Abdominal pain, unspecified abdominal location.    Subjective   Patient sitting up in the bed receiving dialysis. Patient endorses improvement in symptoms. Denies current abdominal pain, n/v. Diarrhea has slowed down.   Objective     Physical Exam  Vitals reviewed.   Constitutional:       Appearance: Normal appearance.   HENT:      Head: Normocephalic and atraumatic.      Mouth/Throat:      Mouth: Mucous membranes are moist.   Eyes:      Extraocular Movements: Extraocular movements intact.   Cardiovascular:      Rate and Rhythm: Normal rate and regular rhythm.      Pulses: Normal pulses.      Heart sounds: Normal heart sounds.   Pulmonary:      Breath sounds: Normal breath sounds.   Abdominal:      General: Bowel sounds are normal. There is no distension.      Palpations: Abdomen is soft.      Tenderness: There is no abdominal tenderness. There is no guarding or rebound.   Musculoskeletal:         General: No swelling. Normal range of motion.      Cervical back: Normal range of motion.   Skin:     General: Skin is warm and dry.   Neurological:      General: No focal deficit present.      Mental Status: She is alert and oriented to person, place, and time.   Psychiatric:         Mood and Affect: Mood normal.         Behavior: Behavior normal.         Last Recorded Vitals  Blood pressure 136/56, pulse 78, temperature 36.7 °C (98.1 °F), temperature source Temporal, resp. rate 18, height 1.524 m (5'), weight 76.7 kg (169 lb), SpO2 97%.  Intake/Output last 3 Shifts:  I/O last 3 completed shifts:  In: 1399 (18.3 mL/kg) [P.O.:300; I.V.:1099 (14.3 mL/kg)]  Out: - (0 mL/kg)   Weight: 76.7 kg     Relevant Results      XR chest 1 view    Result Date: 1/22/2025  Interpreted By:  Reanna Osman, STUDY: XR CHEST 1 VIEW; ;  1/22/2025 2:04 pm   INDICATION: Signs/Symptoms:cough.     COMPARISON: Chest radiograph dated 01/22/2024   ACCESSION NUMBER(S):  [id number]   ORDERING CLINICIAN: SUSY COBB   FINDINGS: Cardiac silhouette is mildly enlarged but is similar compared to prior radiograph. Dense atherosclerotic calcifications of the aortic knob are noted. There are prominent interstitial markings in bilateral lungs, slightly more pronounced on today's radiograph compared to immediate prior radiograph. No consolidative airspace opacity is noted. There is obliteration of bilateral costophrenic angles, which could be related to pleural scarring versus pleural effusions. No large pneumothorax within limits of portable technique. No acute osseous findings.       1. Mild prominence of interstitial markings in bilateral lungs. This could be related to chronic lung parenchymal changes versus mild interstitial edema. Recommend correlation with fluid status. 2. Possible trace bilateral pleural effusions/pleural scarring.       MACRO: None   Signed by: Reanna Osman 1/22/2025 4:11 PM Dictation workstation:   [id number]    CT angio abdomen pelvis w and or wo IV IV contrast    Result Date: 1/22/2025  Interpreted By:  Juliette Yeh, STUDY: CT ANGIO ABDOMEN PELVIS W AND/OR WO IV IV CONTRAST;  1/22/2025 5:43 am   INDICATION: Signs/Symptoms:Left lower quadrant pain, concern for GI bleed   COMPARISON: None.   ACCESSION NUMBER(S): [id number]   ORDERING CLINICIAN: SOTERO WHEELER   TECHNIQUE: Noncontrast axial CT images were obtained through the abdomen and pelvis prior to administration of intravenous contrast. Thin-section axial images of the abdomen and pelvis in the arterial phase and portal venous phase after the uneventful administration of intravenous contrast material. Sagittal and coronal reconstructions. MIP reconstructed images were performed on an independent workstation and provided for review.   FINDINGS: NON-CONTRAST IMAGING:   Atherosclerotic calcifications are noted at the abdominal aorta and its branches. There is cholelithiasis. No obstructing ureteral  calculi are identified.   POST-CONTRAST IMAGING:   Vascular:  No abdominal aortic aneurysm or acute dissection. The celiac artery, the superior mesenteric artery, the bilateral renal arteries and the inferior mesenteric artery are patent. The bilateral common iliac arteries, the bilateral internal iliac arteries, the bilateral external iliac arteries, the bilateral common femoral arteries and the visualized bilateral proximal femoral arteries are patent.     LOWER CHEST: There is mild bibasilar atelectasis. No pleural effusion. The heart is enlarged. There is trace pericardial effusion. BONES: Multilevel degenerative changes at the spine. ABDOMINAL WALL: There is a small fat containing umbilical hernia.   ABDOMEN:   LIVER: Within normal limits. BILE DUCTS: Normal caliber. GALLBLADDER: There is cholelithiasis. PANCREAS: Within normal limits. SPLEEN: Within normal limits. ADRENALS: Within normal limits. KIDNEYS and URETERS: There is bilateral renal cortical atrophy. No hydronephrosis. There is a 1.2 cm indeterminate lesion at the right kidney.   RETROPERITONEUM: No retroperitoneal hemorrhage.   PELVIS:   REPRODUCTIVE ORGANS: No pelvic mass. BLADDER: The urinary bladder is partially distended with circumferential wall thickening. There is mild soft tissue stranding at the urinary bladder.   BOWEL: No bowel obstruction. There is mild fluid distention of small bowel loops. There is liquid stool at the colon and rectum. There is colonic diverticulosis with no CT evidence for acute diverticulitis. No intraluminal hyperdensity to suggest active GI bleeding. PERITONEUM: No ascites or free air. No pathologically enlarged lymph nodes are identified.       1. No CT angiogram evidence of active GI bleeding. No abdominal aortic aneurysm or acute dissection. 2. Liquid stool at the colon and rectum and mild fluid distention of small bowel loops, suggestive of enterocolitis. 3. Colonic diverticulosis. 4. Circumferential wall  thickening at the urinary bladder and mild soft tissue stranding. Please correlate with urinalysis to evaluate for cystitis . 5. Bilateral renal cortical atrophy. 1.2 cm indeterminate lesion at the right kidney, neoplasm cannot be excluded. MRI can be obtained for further evaluation. 6. Cholelithiasis. 7. Cardiomegaly. Trace pericardial effusion.         MACRO: Critical Finding:  See findings. Notification was initiated on 1/22/2025 at 6:52 am by  Juliette Yeh.  (**-YCF-**) Instructions:   Signed by: Juliette Yhe 1/22/2025 6:57 AM Dictation workstation:   [id number]    * Cannot find OR log *  Last relevant procedure:               Scheduled medications  amitriptyline, 10 mg, oral, Nightly  amLODIPine, 5 mg, oral, Daily  atorvastatin, 20 mg, oral, Nightly  calcitriol, 0.25 mcg, oral, Every Mon/Wed/Fri  calcium carbonate, 1,500 mg, oral, TID  carvedilol, 25 mg, oral, BID  heparin, 2,500 Units, subcutaneous, q8h  hydroxyurea, 500 mg, oral, Every Mon/Wed/Fri  losartan, 100 mg, oral, Daily  oxygen, , inhalation, Continuous - Inhalation  pantoprazole, 40 mg, oral, BID AC      Continuous medications  sodium chloride 0.9%, 70 mL/hr, Last Rate: 70 mL/hr (01/23/25 0405)      PRN medications  PRN medications: acetaminophen, alum-mag hydroxide-simeth, benzonatate, guaiFENesin, melatonin, ondansetron, sodium chloride     Results for orders placed or performed during the hospital encounter of 01/22/25 (from the past 24 hours)   Stool Pathogen Panel, PCR    Specimen: Stool   Result Value Ref Range    Campylobacter Group Not Detected Not Detected    Salmonella species Not Detected Not Detected    Shigella species Not Detected Not Detected    Vibrio Group Not Detected Not Detected    Yersinia Enterocolitica Not Detected Not Detected    Shiga Toxin 1 Not Detected Not Detected    Shiga Toxin 2 Not Detected Not Detected    Norovirus GI/GII Detected (A) Not Detected    Rotavirus A Not Detected Not Detected   C. difficile, PCR     Specimen: Stool   Result Value Ref Range    C. difficile, PCR Detected (A) Not Detected   Clostridium Difficile EIA    Specimen: Stool   Result Value Ref Range    C. difficile (Toxin A/B) Negative Negative, Indeterminate   B-type natriuretic peptide   Result Value Ref Range     (H) 0 - 99 pg/mL   Basic Metabolic Panel   Result Value Ref Range    Glucose 92 74 - 99 mg/dL    Sodium 134 (L) 136 - 145 mmol/L    Potassium 5.0 3.5 - 5.3 mmol/L    Chloride 99 98 - 107 mmol/L    Bicarbonate 19 (L) 21 - 32 mmol/L    Anion Gap 21 (H) 10 - 20 mmol/L    Urea Nitrogen 88 (H) 6 - 23 mg/dL    Creatinine 9.08 (H) 0.50 - 1.05 mg/dL    eGFR 4 (L) >60 mL/min/1.73m*2    Calcium 8.2 (L) 8.6 - 10.3 mg/dL   CBC and Auto Differential   Result Value Ref Range    WBC 8.0 4.4 - 11.3 x10*3/uL    nRBC 0.0 0.0 - 0.0 /100 WBCs    RBC 2.80 (L) 4.00 - 5.20 x10*6/uL    Hemoglobin 9.3 (L) 12.0 - 16.0 g/dL    Hematocrit 29.0 (L) 36.0 - 46.0 %     (H) 80 - 100 fL    MCH 33.2 26.0 - 34.0 pg    MCHC 32.1 32.0 - 36.0 g/dL    RDW 14.5 11.5 - 14.5 %    Platelets 353 150 - 450 x10*3/uL    Neutrophils % 71.3 40.0 - 80.0 %    Immature Granulocytes %, Automated 1.0 (H) 0.0 - 0.9 %    Lymphocytes % 14.4 13.0 - 44.0 %    Monocytes % 11.7 2.0 - 10.0 %    Eosinophils % 1.3 0.0 - 6.0 %    Basophils % 0.3 0.0 - 2.0 %    Neutrophils Absolute 5.68 (H) 1.60 - 5.50 x10*3/uL    Immature Granulocytes Absolute, Automated 0.08 0.00 - 0.50 x10*3/uL    Lymphocytes Absolute 1.15 0.80 - 3.00 x10*3/uL    Monocytes Absolute 0.93 (H) 0.05 - 0.80 x10*3/uL    Eosinophils Absolute 0.10 0.00 - 0.40 x10*3/uL    Basophils Absolute 0.02 0.00 - 0.10 x10*3/uL   Phosphorus   Result Value Ref Range    Phosphorus 5.2 (H) 2.5 - 4.9 mg/dL   C-reactive protein   Result Value Ref Range    C-Reactive Protein 2.77 (H) <1.00 mg/dL               Assessment/Plan   Assessment & Plan  Abdominal pain, unspecified abdominal location    Abdominal pain    Nausea and  vomiting    Enterocolitis    #Positive Norovirus found on stool patho. Positive C diff PCR, but symptoms likely from norovirus. Stools improving, hgb 9.3, stool and gastric occult negative.     Continue current regiment  Daily CBC   Continue antiemetics PRN   Advance diet as tolerated         Plan discussed with Dr Michael Chacko, APRN-CNP    I saw and evaluated the patient. I personally obtained the key and critical portions of the history and physical exam or was physically present for key and critical portions performed by the NP. I reviewed the NP's documentation and discussed the patient with the NP. I agree with the NP's medical decision making as documented in the note.   Patient with ESRD, norovirus infection symptom improved since admission however as per nurse she had several episode of diarrhea around 3 PM.  C. difficile PCR positive but negative toxin    Watch clinically  Supportive management  Start vancomycin 125 mg q 6 hours if persistent diarrhea

## 2025-01-23 NOTE — PRE-PROCEDURE NOTE
Report from Sending RN:    Report From: Chrissy GONZALEZ   Recent Surgery of Procedure: No  Baseline Level of Consciousness (LOC): x4  Oxygen Use: Yes  Type: nc  Diabetic: No  Last BP Med Given Day of Dialysis: see emar   Last Pain Med Given: see emar   Lab Tests to be Obtained with Dialysis: Yes  Blood Transfusion to be Given During Dialysis: No  Available IV Access: Yes  Medications to be Administered During Dialysis: No  Continuous IV Infusion Running: saline gtt  Restraints on Currently or in the Last 24 Hours: No  Hand-Off Communication: stable for hd at bedside due to isolationg  Dialysis Catheter Dressing: na avf  Last Dressing Change: na

## 2025-01-23 NOTE — CONSULTS
Consults  Referred by ISIS Zepeda MD: Brenna Ly, APRN-CNP    Reason For Consult  C. Diff / norovirus    History Of Present Illness  Joyce Bethea is a 79 y.o. female, hx of obesity, hx of ESRD on dialysis, hx of HTN, hx of GERD, hx of COPD on O2, hx of HEATH, she was admitted for a 1 day hx of LLQ abdominal pain, moderate, sharp, intermittent, associated with nausea, emesis and diarrhea, no fever, no chills, the WBC were up, the CT showed atelectasis, cholelithiasis, dilated small bowel, liquid stools, the stool pathogen PCR positive for norovirus, the C. Diff PCR is positive, the EIA toxins are negative, no recent antibiotics, no known hx of C. Diff before.     Past Medical History  She has a past medical history of Body mass index (BMI) 31.0-31.9, adult (04/12/2019), Encounter for follow-up examination after completed treatment for conditions other than malignant neoplasm (11/30/2017), Other conditions influencing health status (03/12/2019), Other specified disorders of bone, unspecified site (03/11/2015), Personal history of malignant neoplasm of breast (02/05/2014), Personal history of malignant neoplasm, unspecified, Personal history of other diseases of the circulatory system, Personal history of other diseases of the digestive system, Personal history of other diseases of the musculoskeletal system and connective tissue (01/21/2016), Personal history of other diseases of the musculoskeletal system and connective tissue, Personal history of other diseases of the nervous system and sense organs, Personal history of other diseases of urinary system, Respiratory failure, unspecified with hypoxia (Multi) (12/29/2017), Shortness of breath (10/06/2017), Unspecified cataract, and Unspecified fracture of unspecified forearm, sequela (10/04/2018).    Surgical History  She has a past surgical history that includes Colonoscopy (02/05/2014); Tonsillectomy (02/05/2014); Mastectomy (02/05/2014); Rotator  cuff repair (04/01/2014); Knee arthroscopy w/ meniscal repair (04/01/2014); Other surgical history (09/21/2021); MR angio head wo IV contrast (2/23/2019); MR angio neck wo IV contrast (2/23/2019); and IR CVC tunneled (10/12/2021).     Social History     Occupational History    Not on file   Tobacco Use    Smoking status: Never    Smokeless tobacco: Never   Vaping Use    Vaping status: Never Used   Substance and Sexual Activity    Alcohol use: Never    Drug use: Never    Sexual activity: Not on file     Travel History   Travel since 12/23/24    No documented travel since 12/23/24            Family History  Family History   Problem Relation Name Age of Onset    Hypertension Mother      Leukemia Mother      Osteoarthritis Mother      Colon cancer Father      Diabetes Father      Hypertension Father      Osteoarthritis Sister      Osteoarthritis Other Aunt     Stroke Other Aunt     Stroke Other Uncle     Breast cancer Other Family History      Allergies  Bee pollen, House dust, House dust mite, and Adhesive tape-silicones     Immunization History   Administered Date(s) Administered    COVID-19, mRNA, LNP-S, PF, 30 mcg/0.3 mL dose 01/05/2021, 01/26/2021, 12/08/2021    Flu vaccine, trivalent, preservative free, HIGH-DOSE, age 65y+ (Fluzone) 10/07/2018    Flu vaccine, trivalent, preservative free, age 6 months and greater (Fluarix/Fluzone/Flulaval) 09/08/2015    Hep A, Unspecified 05/15/2003    Influenza, Unspecified 01/16/2014, 10/01/2019    Moderna COVID-19 vaccine, 12 years and older (50mcg/0.5mL)(Spikevax) 10/26/2024    Pfizer COVID-19 vaccine, 12 years and older, (30mcg/0.3mL) (Comirnaty) 11/08/2023    Pfizer COVID-19 vaccine, bivalent, age 12 years and older (30 mcg/0.3 mL) 10/19/2022    Pfizer Gray Cap SARS-CoV-2 04/20/2022   Pneumonia and influenza vaccines are planned  Zhao fall risk 60, preventive protocol was implemented  Depression screen is negative    Medications  Home medications:  Medications Prior to  Admission   Medication Sig Dispense Refill Last Dose/Taking    amitriptyline (Elavil) 10 mg tablet Take 1 tablet (10 mg) by mouth once daily.   1/21/2025 Evening    amLODIPine (Norvasc) 5 mg tablet Take 1 tablet (5 mg) by mouth once daily.   1/21/2025 at  9:00 AM    atorvastatin (Lipitor) 20 mg tablet Take 1 tablet (20 mg) by mouth once daily at bedtime. 90 tablet 1 1/21/2025 Evening    B complex-vitamin C-folic acid (Nephrocaps) 1 mg capsule Take 1 capsule by mouth once daily.   1/21/2025 at  9:00 AM    calcitriol (Rocaltrol) 0.25 mcg capsule Take 1 capsule (0.25 mcg) by mouth once a day on Monday, Wednesday, and Friday.   1/21/2025 at  9:00 AM    carvedilol (Coreg) 25 mg tablet Take 1 tablet (25 mg) by mouth 2 times daily (morning and late afternoon).   1/21/2025 at  9:00 PM    losartan (Cozaar) 25 mg tablet Take 4 tablets (100 mg) by mouth once daily.   1/21/2025 at  9:00 AM    sennosides-docusate sodium (Senna-S) 8.6-50 mg tablet Take 1 tablet by mouth once daily at bedtime.   1/21/2025 at  9:00 PM    acetaminophen (Tylenol) 325 mg tablet Take 2 tablets (650 mg) by mouth every 8 hours if needed for mild pain (1 - 3), headaches or fever (temp greater than 38.0 C). Max 2000mg daily   Unknown    Bacillus coagulans/inulin (PROBIOTIC FORMULA, INULIN, ORAL) Take 1 capsule by mouth once daily.   Unknown    cholecalciferol (Vitamin D-3) 50 mcg (2,000 unit) capsule Take 1 capsule (50 mcg) by mouth early in the morning..   Unknown    epoetin palomo (Procrit) 2,000 unit/mL injection Inject 1 mL (2,000 Units) under the skin once a day on Monday, Wednesday, and Friday.   1/20/2025 at  9:00 AM    guaiFENesin (Mucinex) 600 mg 12 hr tablet Take 1 tablet (600 mg) by mouth every 12 hours if needed for cough or congestion.   Unknown    hydroxyurea (Hydrea) 500 mg capsule Take 1 capsule (500 mg total) by mouth once a day on Monday, Wednesday, and Friday.  M, W, F 90 capsule 1 1/20/2025 at  9:00 AM    loratadine (Claritin) 10 mg  tablet Take 1 tablet (10 mg) by mouth once daily as needed for allergies. In the morning   Unknown    meclizine (Antivert) 12.5 mg tablet Take 1 tablet (12.5 mg) by mouth 3 times a day as needed for dizziness. 30 tablet 0 Unknown    nitroglycerin (Nitrostat) 0.4 mg SL tablet Place 1 tablet (0.4 mg) under the tongue every 5 minutes if needed for chest pain.   Unknown    omeprazole (PriLOSEC) 20 mg DR capsule Take 1 capsule (20 mg) by mouth once daily as needed.       oxygen (O2) gas therapy Oxygen   Refills: 0       Active       sevelamer carbonate (Renvela) 800 mg tablet Take 1 tablet (800 mg) by mouth 3 times daily (morning, midday, late afternoon). Swallow tablet whole; do not crush, break, or chew. (Patient not taking: Reported on 1/22/2025) 90 tablet 0 Not Taking    sodium chloride (Ocean) 0.65 % nasal spray Administer 2 sprays into each nostril every 1 hour if needed for congestion.   Unknown     Current medications:  Scheduled medications  amitriptyline, 10 mg, oral, Nightly  amLODIPine, 5 mg, oral, Daily  atorvastatin, 20 mg, oral, Nightly  calcitriol, 0.25 mcg, oral, Every Mon/Wed/Fri  calcium carbonate, 1,500 mg, oral, TID  carvedilol, 25 mg, oral, BID  heparin, 2,500 Units, subcutaneous, q8h  hydroxyurea, 500 mg, oral, Every Mon/Wed/Fri  lactobacillus acidophilus, 1 capsule, oral, Daily  losartan, 100 mg, oral, Daily  oxygen, , inhalation, Continuous - Inhalation  pantoprazole, 40 mg, oral, BID AC  vancomycin, 125 mg, oral, q6h LISA      Continuous medications     PRN medications  PRN medications: acetaminophen, alum-mag hydroxide-simeth, benzonatate, guaiFENesin, melatonin, ondansetron, sodium chloride    Review of Systems   All other systems reviewed and are negative.       Objective  Range of Vitals (last 24 hours)  Heart Rate:  [77-92]   Temp:  [36.7 °C (98.1 °F)-37 °C (98.6 °F)]   Resp:  [17-18]   BP: (136-150)/(56-68)   Height:  [152.4 cm (5')]   Weight:  [74.6 kg (164 lb 6.4 oz)]   SpO2:  [96 %-99 %]    Daily Weight  01/23/25 : 74.6 kg (164 lb 6.4 oz)    Body mass index is 32.11 kg/m².   Nutritional consult    Physical Exam  Constitutional:       Appearance: Normal appearance.   HENT:      Head: Normocephalic and atraumatic.      Mouth/Throat:      Mouth: Mucous membranes are moist.      Pharynx: Oropharynx is clear.   Eyes:      Pupils: Pupils are equal, round, and reactive to light.   Cardiovascular:      Rate and Rhythm: Normal rate and regular rhythm.      Heart sounds: Normal heart sounds.   Pulmonary:      Effort: Pulmonary effort is normal.      Breath sounds: Normal breath sounds.   Abdominal:      General: Abdomen is flat. Bowel sounds are normal.      Palpations: Abdomen is soft.   Musculoskeletal:      Cervical back: Normal range of motion.   Neurological:      Mental Status: She is alert.          Relevant Results  Outside Hospital Results  reviewed  Labs  Results from last 72 hours   Lab Units 01/23/25  0607 01/22/25  0431   WBC AUTO x10*3/uL 8.0 17.2*   HEMOGLOBIN g/dL 9.3* 11.9*   HEMATOCRIT % 29.0* 35.9*   PLATELETS AUTO x10*3/uL 353 437   NEUTROS PCT AUTO % 71.3 86.7   LYMPHS PCT AUTO % 14.4 4.2   MONOS PCT AUTO % 11.7 5.5   EOS PCT AUTO % 1.3 2.9     Results from last 72 hours   Lab Units 01/23/25  0607 01/22/25  0431   SODIUM mmol/L 134* 136   POTASSIUM mmol/L 5.0 4.7   CHLORIDE mmol/L 99 97*   CO2 mmol/L 19* 25   BUN mg/dL 88* 64*   CREATININE mg/dL 9.08* 7.05*   GLUCOSE mg/dL 92 122*   CALCIUM mg/dL 8.2* 9.9   ANION GAP mmol/L 21* 19   EGFR mL/min/1.73m*2 4* 5*   PHOSPHORUS mg/dL 5.2*  --      Results from last 72 hours   Lab Units 01/22/25  0431   ALK PHOS U/L 249*   BILIRUBIN TOTAL mg/dL 0.4   PROTEIN TOTAL g/dL 7.2   ALT U/L 32   AST U/L 25   ALBUMIN g/dL 4.2     Estimated Creatinine Clearance: 4.4 mL/min (A) (by C-G formula based on SCr of 9.08 mg/dL (H)).  C-Reactive Protein   Date Value Ref Range Status   01/23/2025 2.77 (H) <1.00 mg/dL Final     CRP   Date Value Ref Range Status  "  10/12/2021 1.67 (A) mg/dL Final     Comment:     REF VALUE  < 1.00       Sedimentation Rate   Date Value Ref Range Status   10/12/2021 63 (H) 0 - 30 mm/h Final     No results found for: \"HIV1X2\", \"HIVCONF\", \"PZAOML4YF\"  No results found for: \"HEPCABINIT\", \"HEPCAB\", \"HCVPCRQUANT\"  Microbiology  Reviewed  Imaging  Reviewed      Assessment/Plan   Gastroenteritis secondary to norovirus  Positive C. Diff PCR with negative EIA toxins, likely colonization  Respiratory failure / COPD / HEATH / atelectasis     Recommendations :  Supportive care  Gentle hydration  Chest PT / incentive spirometry  Avoid antibiotics if at all possible    I spent minutes in the professional and overall care of this patient.      Rachael Garnica MD  "

## 2025-01-23 NOTE — PROGRESS NOTES
01/23/25 1500   Referral Data   Referral Source Self referral   Referral Reason Follow up  (dialysis pt)   County Information   [place]     Pt reportedly gets HD at [facility] on MWF.  SW sent initial clinicals; will follow.

## 2025-01-23 NOTE — PROGRESS NOTES
Patient: Joyce Bethea  Room/bed: 103/103-A  Admitted on: 1/22/2025    Age: 79 y.o.   Gender: female  Code Status:  DNR and No Intubation   Admitting Dx: Enterocolitis [K52.9]  Abdominal pain, unspecified abdominal location [R10.9]  Nausea and vomiting, unspecified vomiting type [R11.2]    MRN: [id number]  PCP: FANNIE Cox       Subjective   Feels a little better. No vomiting. Only a couple stools overnight. weak    Objective    Physical Exam  Constitutional:       Comments: Less fatigued appearing today. alert   HENT:      Head: Normocephalic.      Nose: Nose normal.      Mouth/Throat:      Mouth: Mucous membranes are dry.   Eyes:      Extraocular Movements: Extraocular movements intact.      Pupils: Pupils are equal, round, and reactive to light.   Cardiovascular:      Pulses: Normal pulses.      Comments: Regular, S4. 1-2/6 low systolic murmur  Pulmonary:      Effort: Pulmonary effort is normal.      Breath sounds: Normal breath sounds.   Abdominal:      Comments: Increased bowel sounds  Less tender across lower abdomen today   Musculoskeletal:      Comments: No edema. Pulses equal   Skin:     General: Skin is dry.   Neurological:      Mental Status: She is oriented to person, place, and time. Mental status is at baseline.   Psychiatric:         Mood and Affect: Mood normal.         Behavior: Behavior normal.        Temp:  [36.6 °C (97.9 °F)-37 °C (98.6 °F)] 36.7 °C (98.1 °F)  Heart Rate:  [78-92] 78  Resp:  [17-18] 18  BP: (136-166)/(56-66) 136/56    Vitals:    01/22/25 0324   Weight: 76.7 kg (169 lb)           I/Os    Intake/Output Summary (Last 24 hours) at 1/23/2025 0922  Last data filed at 1/23/2025 0600  Gross per 24 hour   Intake 1399 ml   Output --   Net 1399 ml       Labs:   Results from last 72 hours   Lab Units 01/23/25  0607 01/22/25  0431   SODIUM mmol/L 134* 136   POTASSIUM mmol/L 5.0 4.7   CHLORIDE mmol/L 99 97*   CO2 mmol/L 19* 25   BUN mg/dL 88* 64*   CREATININE mg/dL 9.08*  "7.05*   GLUCOSE mg/dL 92 122*   CALCIUM mg/dL 8.2* 9.9   ANION GAP mmol/L 21* 19   EGFR mL/min/1.73m*2 4* 5*   PHOSPHORUS mg/dL 5.2*  --       Results from last 72 hours   Lab Units 01/23/25  0607 01/22/25  0431   WBC AUTO x10*3/uL 8.0 17.2*   HEMOGLOBIN g/dL 9.3* 11.9*   HEMATOCRIT % 29.0* 35.9*   PLATELETS AUTO x10*3/uL 353 437   NEUTROS PCT AUTO % 71.3 86.7   LYMPHS PCT AUTO % 14.4 4.2   MONOS PCT AUTO % 11.7 5.5   EOS PCT AUTO % 1.3 2.9      Lab Results   Component Value Date    CALCIUM 8.2 (L) 01/23/2025    PHOS 5.2 (H) 01/23/2025      Lab Results   Component Value Date    CRP 2.77 (H) 01/23/2025      [unfilled]     Micro/ID:   No results found for the last 90 days.                   No lab exists for component: \"AGALPCRNB\"   .ID  No results found for: \"URINECULTURE\", \"BLOODCULT\", \"CSFCULTSMEAR\"    Images:  XR chest 1 view  Narrative: Interpreted By:  Reanna Osman,   STUDY:  XR CHEST 1 VIEW; ;  1/22/2025 2:04 pm      INDICATION:  Signs/Symptoms:cough.          COMPARISON:  Chest radiograph dated 01/22/2024      ACCESSION NUMBER(S):  [id number]      ORDERING CLINICIAN:  SUSY COBB      FINDINGS:  Cardiac silhouette is mildly enlarged but is similar compared to  prior radiograph. Dense atherosclerotic calcifications of the aortic  knob are noted. There are prominent interstitial markings in  bilateral lungs, slightly more pronounced on today's radiograph  compared to immediate prior radiograph. No consolidative airspace  opacity is noted. There is obliteration of bilateral costophrenic  angles, which could be related to pleural scarring versus pleural  effusions. No large pneumothorax within limits of portable technique.  No acute osseous findings.      Impression: 1. Mild prominence of interstitial markings in bilateral lungs. This  could be related to chronic lung parenchymal changes versus mild  interstitial edema. Recommend correlation with fluid status.  2. Possible trace bilateral pleural " effusions/pleural scarring.              MACRO:  None      Signed by: Reanna Osman 1/22/2025 4:11 PM  Dictation workstation:   [id number]  CT angio abdomen pelvis w and or wo IV IV contrast  Narrative: Interpreted By:  Juliette Yeh,   STUDY:  CT ANGIO ABDOMEN PELVIS W AND/OR WO IV IV CONTRAST;  1/22/2025 5:43 am      INDICATION:  Signs/Symptoms:Left lower quadrant pain, concern for GI bleed      COMPARISON:  None.      ACCESSION NUMBER(S):  [id number]      ORDERING CLINICIAN:  SOTERO WHEELER      TECHNIQUE:  Noncontrast axial CT images were obtained through the abdomen and  pelvis prior to administration of intravenous contrast. Thin-section  axial images of the abdomen and pelvis in the arterial phase and  portal venous phase after the uneventful administration of  intravenous contrast material. Sagittal and coronal reconstructions.  MIP reconstructed images were performed on an independent workstation  and provided for review.      FINDINGS:  NON-CONTRAST IMAGING:      Atherosclerotic calcifications are noted at the abdominal aorta and  its branches. There is cholelithiasis.  No obstructing ureteral calculi are identified.      POST-CONTRAST IMAGING:      Vascular:  No abdominal aortic aneurysm or acute dissection.  The celiac artery, the superior mesenteric artery, the bilateral  renal arteries and the inferior mesenteric artery are patent. The  bilateral common iliac arteries, the bilateral internal iliac  arteries, the bilateral external iliac arteries, the bilateral common  femoral arteries and the visualized bilateral proximal femoral  arteries are patent.          LOWER CHEST: There is mild bibasilar atelectasis. No pleural  effusion. The heart is enlarged. There is trace pericardial effusion.  BONES: Multilevel degenerative changes at the spine. ABDOMINAL WALL:  There is a small fat containing umbilical hernia.      ABDOMEN:      LIVER: Within normal limits.  BILE DUCTS: Normal  caliber.  GALLBLADDER: There is cholelithiasis.  PANCREAS: Within normal limits.  SPLEEN: Within normal limits.  ADRENALS: Within normal limits.  KIDNEYS and URETERS: There is bilateral renal cortical atrophy. No  hydronephrosis. There is a 1.2 cm indeterminate lesion at the right  kidney.      RETROPERITONEUM: No retroperitoneal hemorrhage.      PELVIS:      REPRODUCTIVE ORGANS: No pelvic mass.  BLADDER: The urinary bladder is partially distended with  circumferential wall thickening. There is mild soft tissue stranding  at the urinary bladder.      BOWEL: No bowel obstruction. There is mild fluid distention of small  bowel loops. There is liquid stool at the colon and rectum. There is  colonic diverticulosis with no CT evidence for acute diverticulitis.  No intraluminal hyperdensity to suggest active GI bleeding.  PERITONEUM: No ascites or free air. No pathologically enlarged lymph  nodes are identified.      Impression: 1. No CT angiogram evidence of active GI bleeding. No abdominal  aortic aneurysm or acute dissection.  2. Liquid stool at the colon and rectum and mild fluid distention of  small bowel loops, suggestive of enterocolitis.  3. Colonic diverticulosis.  4. Circumferential wall thickening at the urinary bladder and mild  soft tissue stranding. Please correlate with urinalysis to evaluate  for cystitis .  5. Bilateral renal cortical atrophy. 1.2 cm indeterminate lesion at  the right kidney, neoplasm cannot be excluded. MRI can be obtained  for further evaluation.  6. Cholelithiasis.  7. Cardiomegaly. Trace pericardial effusion.                  MACRO:  Critical Finding:  See findings. Notification was initiated on  1/22/2025 at 6:52 am by  Juliette Yeh.  (**-YCF-**) Instructions:      Signed by: Juliette Yeh 1/22/2025 6:57 AM  Dictation workstation:   [id number]       Meds    Scheduled medications  amitriptyline, 10 mg, oral, Nightly  amLODIPine, 5 mg, oral, Daily  atorvastatin, 20 mg, oral,  Nightly  calcitriol, 0.25 mcg, oral, Every Mon/Wed/Fri  carvedilol, 25 mg, oral, BID  heparin, 2,500 Units, subcutaneous, q8h  hydroxyurea, 500 mg, oral, Every Mon/Wed/Fri  losartan, 100 mg, oral, Daily  oxygen, , inhalation, Continuous - Inhalation  pantoprazole, 40 mg, intravenous, BID      Continuous medications  sodium chloride 0.9%, 70 mL/hr, Last Rate: 70 mL/hr (01/23/25 0405)      PRN medications  PRN medications: acetaminophen, alum-mag hydroxide-simeth, benzonatate, guaiFENesin, melatonin, ondansetron, sodium chloride     Assessment and Plan    Joyce Bethea is a 79 y.o. female   Norovirus. Believe this is the culprit of her symptoms. CT consistent with noro as well. Stools are slowing. Will advance diet today. Positive c diff PCR not indicative of acute infection in this patient  Dehydration, improving. Will stop fluids today  ESRD, dialysis dependent. Loud bruit in RUE. She will need dialysis today.   Chronic respiratory failure, 3-4 liters baseline. Stable  Hx breast cancer  GERD. Tums with meals , will order  Anemia, chronic, stable    Camille Zepeda MD

## 2025-01-23 NOTE — POST-PROCEDURE NOTE
Report to Receiving RN:    Report To: Chrissy GONZALEZ   Time Report Called: [id number]  Hand-Off Communication: stable post hd 0 ml net removed per order no issues.  Complications During Treatment: No  Ultrafiltration Treatment: No  Medications Administered During Dialysis: No  Blood Products Administered During Dialysis: No  Labs Sent During Dialysis: yes  Heparin Drip Rate Changes: No  Dialysis Catheter Dressing: na   Last Dressing Change: na    Electronic Signatures:  Chidi Bustillo RN  (Signed )   Authored:    (Signed )   Authored:    Last Updated: 2:33 PM by CHIDI BUSTILLO

## 2025-01-24 ENCOUNTER — APPOINTMENT (OUTPATIENT)
Dept: DIALYSIS | Facility: HOSPITAL | Age: 80
End: 2025-01-24
Payer: MEDICARE

## 2025-01-24 LAB
ALBUMIN SERPL BCP-MCNC: 3 G/DL (ref 3.4–5)
ALP SERPL-CCNC: 161 U/L (ref 33–136)
ALT SERPL W P-5'-P-CCNC: 19 U/L (ref 7–45)
ANION GAP SERPL CALC-SCNC: 13 MMOL/L (ref 10–20)
AST SERPL W P-5'-P-CCNC: 17 U/L (ref 9–39)
BILIRUB SERPL-MCNC: 0.3 MG/DL (ref 0–1.2)
BUN SERPL-MCNC: 38 MG/DL (ref 6–23)
CALCIUM SERPL-MCNC: 8.1 MG/DL (ref 8.6–10.3)
CHLORIDE SERPL-SCNC: 94 MMOL/L (ref 98–107)
CO2 SERPL-SCNC: 28 MMOL/L (ref 21–32)
CREAT SERPL-MCNC: 5.51 MG/DL (ref 0.5–1.05)
EGFRCR SERPLBLD CKD-EPI 2021: 7 ML/MIN/1.73M*2
GLUCOSE SERPL-MCNC: 88 MG/DL (ref 74–99)
POTASSIUM SERPL-SCNC: 3.9 MMOL/L (ref 3.5–5.3)
PROT SERPL-MCNC: 5.6 G/DL (ref 6.4–8.2)
SODIUM SERPL-SCNC: 131 MMOL/L (ref 136–145)

## 2025-01-24 PROCEDURE — 36415 COLL VENOUS BLD VENIPUNCTURE: CPT | Performed by: INTERNAL MEDICINE

## 2025-01-24 PROCEDURE — 2500000004 HC RX 250 GENERAL PHARMACY W/ HCPCS (ALT 636 FOR OP/ED): Performed by: INTERNAL MEDICINE

## 2025-01-24 PROCEDURE — 99232 SBSQ HOSP IP/OBS MODERATE 35: CPT | Performed by: INTERNAL MEDICINE

## 2025-01-24 PROCEDURE — 2500000001 HC RX 250 WO HCPCS SELF ADMINISTERED DRUGS (ALT 637 FOR MEDICARE OP): Performed by: INTERNAL MEDICINE

## 2025-01-24 PROCEDURE — 8010000001 HC DIALYSIS - HEMODIALYSIS PER DAY

## 2025-01-24 PROCEDURE — 2500000002 HC RX 250 W HCPCS SELF ADMINISTERED DRUGS (ALT 637 FOR MEDICARE OP, ALT 636 FOR OP/ED): Performed by: INTERNAL MEDICINE

## 2025-01-24 PROCEDURE — 2500000005 HC RX 250 GENERAL PHARMACY W/O HCPCS: Performed by: INTERNAL MEDICINE

## 2025-01-24 PROCEDURE — 84075 ASSAY ALKALINE PHOSPHATASE: CPT | Performed by: INTERNAL MEDICINE

## 2025-01-24 PROCEDURE — 1200000002 HC GENERAL ROOM WITH TELEMETRY DAILY

## 2025-01-24 PROCEDURE — 2500000001 HC RX 250 WO HCPCS SELF ADMINISTERED DRUGS (ALT 637 FOR MEDICARE OP): Performed by: NURSE PRACTITIONER

## 2025-01-24 PROCEDURE — 99233 SBSQ HOSP IP/OBS HIGH 50: CPT | Performed by: INTERNAL MEDICINE

## 2025-01-24 RX ORDER — CETIRIZINE HYDROCHLORIDE 10 MG/1
10 TABLET ORAL DAILY
Status: DISCONTINUED | OUTPATIENT
Start: 2025-01-24 | End: 2025-01-28 | Stop reason: HOSPADM

## 2025-01-24 RX ORDER — DIPHENOXYLATE HYDROCHLORIDE AND ATROPINE SULFATE 2.5; .025 MG/1; MG/1
1 TABLET ORAL 4 TIMES DAILY PRN
Status: DISCONTINUED | OUTPATIENT
Start: 2025-01-24 | End: 2025-01-28 | Stop reason: HOSPADM

## 2025-01-24 RX ADMIN — HEPARIN SODIUM 2500 UNITS: 5000 INJECTION, SOLUTION INTRAVENOUS; SUBCUTANEOUS at 14:04

## 2025-01-24 RX ADMIN — LOSARTAN POTASSIUM 100 MG: 50 TABLET, FILM COATED ORAL at 08:00

## 2025-01-24 RX ADMIN — VANCOMYCIN HYDROCHLORIDE 125 MG: 125 CAPSULE ORAL at 06:00

## 2025-01-24 RX ADMIN — Medication 1 CAPSULE: at 08:00

## 2025-01-24 RX ADMIN — DIPHENOXYLATE HYDROCHLORIDE AND ATROPINE SULFATE 1 TABLET: 2.5; .025 TABLET ORAL at 20:52

## 2025-01-24 RX ADMIN — DIPHENOXYLATE HYDROCHLORIDE AND ATROPINE SULFATE 1 TABLET: 2.5; .025 TABLET ORAL at 09:23

## 2025-01-24 RX ADMIN — Medication 2 L/MIN: at 08:23

## 2025-01-24 RX ADMIN — AMITRIPTYLINE HYDROCHLORIDE 10 MG: 10 TABLET, FILM COATED ORAL at 20:52

## 2025-01-24 RX ADMIN — CARVEDILOL 25 MG: 25 TABLET, FILM COATED ORAL at 08:00

## 2025-01-24 RX ADMIN — AMLODIPINE BESYLATE 5 MG: 5 TABLET ORAL at 08:01

## 2025-01-24 RX ADMIN — CETIRIZINE HYDROCHLORIDE 10 MG: 10 TABLET, FILM COATED ORAL at 09:23

## 2025-01-24 RX ADMIN — Medication 5 MG: at 20:52

## 2025-01-24 RX ADMIN — VANCOMYCIN HYDROCHLORIDE 125 MG: 125 CAPSULE ORAL at 12:35

## 2025-01-24 RX ADMIN — Medication 3 L/MIN: at 00:09

## 2025-01-24 RX ADMIN — VANCOMYCIN HYDROCHLORIDE 125 MG: 125 CAPSULE ORAL at 00:08

## 2025-01-24 RX ADMIN — PANTOPRAZOLE SODIUM 40 MG: 40 TABLET, DELAYED RELEASE ORAL at 15:27

## 2025-01-24 RX ADMIN — VANCOMYCIN HYDROCHLORIDE 125 MG: 125 CAPSULE ORAL at 17:24

## 2025-01-24 RX ADMIN — ATORVASTATIN CALCIUM 20 MG: 20 TABLET, FILM COATED ORAL at 20:52

## 2025-01-24 RX ADMIN — HEPARIN SODIUM 2500 UNITS: 5000 INJECTION, SOLUTION INTRAVENOUS; SUBCUTANEOUS at 06:00

## 2025-01-24 RX ADMIN — PANTOPRAZOLE SODIUM 40 MG: 40 TABLET, DELAYED RELEASE ORAL at 06:00

## 2025-01-24 RX ADMIN — CALCIUM CARBONATE 1500 MG: 500 TABLET, CHEWABLE ORAL at 15:27

## 2025-01-24 RX ADMIN — HYDROXYUREA 500 MG: 500 CAPSULE ORAL at 14:05

## 2025-01-24 RX ADMIN — CALCITRIOL CAPSULES 0.25 MCG 0.25 MCG: 0.25 CAPSULE ORAL at 14:05

## 2025-01-24 RX ADMIN — HEPARIN SODIUM 2500 UNITS: 5000 INJECTION, SOLUTION INTRAVENOUS; SUBCUTANEOUS at 21:58

## 2025-01-24 RX ADMIN — CALCIUM CARBONATE 1500 MG: 500 TABLET, CHEWABLE ORAL at 08:00

## 2025-01-24 ASSESSMENT — COGNITIVE AND FUNCTIONAL STATUS - GENERAL
DRESSING REGULAR UPPER BODY CLOTHING: A LITTLE
TURNING FROM BACK TO SIDE WHILE IN FLAT BAD: A LITTLE
TOILETING: A LOT
MOBILITY SCORE: 17
DRESSING REGULAR LOWER BODY CLOTHING: A LITTLE
MOVING TO AND FROM BED TO CHAIR: A LITTLE
CLIMB 3 TO 5 STEPS WITH RAILING: A LOT
TOILETING: A LOT
WALKING IN HOSPITAL ROOM: A LITTLE
MOBILITY SCORE: 18
HELP NEEDED FOR BATHING: A LITTLE
DAILY ACTIVITIY SCORE: 19
DRESSING REGULAR UPPER BODY CLOTHING: A LITTLE
STANDING UP FROM CHAIR USING ARMS: A LITTLE
MOVING TO AND FROM BED TO CHAIR: A LITTLE
CLIMB 3 TO 5 STEPS WITH RAILING: A LOT
DRESSING REGULAR LOWER BODY CLOTHING: A LITTLE
STANDING UP FROM CHAIR USING ARMS: A LITTLE
TURNING FROM BACK TO SIDE WHILE IN FLAT BAD: A LITTLE
DAILY ACTIVITIY SCORE: 19
WALKING IN HOSPITAL ROOM: A LOT
HELP NEEDED FOR BATHING: A LITTLE

## 2025-01-24 ASSESSMENT — PAIN SCALES - GENERAL
PAINLEVEL_OUTOF10: 0 - NO PAIN
PAINLEVEL_OUTOF10: 1

## 2025-01-24 ASSESSMENT — PAIN - FUNCTIONAL ASSESSMENT
PAIN_FUNCTIONAL_ASSESSMENT: NO/DENIES PAIN
PAIN_FUNCTIONAL_ASSESSMENT: 0-10

## 2025-01-24 NOTE — PRE-PROCEDURE NOTE
Report from Sending RN:    Report From: Chrissy Varela  Recent Surgery of Procedure: No  Baseline Level of Consciousness (LOC): a&ox4  Oxygen Use: Yes  Type: 2L  Diabetic: No  Last BP Med Given Day of Dialysis: see emr  Last Pain Med Given: see emr  Lab Tests to be Obtained with Dialysis: No  Blood Transfusion to be Given During Dialysis: No  Available IV Access: Yes  Medications to be Administered During Dialysis: No  Continuous IV Infusion Running: No  Restraints on Currently or in the Last 24 Hours: No  Hand-Off Communication: patient stable and ready for hd tx  Dialysis Catheter Dressing: n/a  Last Dressing Change: n/a

## 2025-01-24 NOTE — PROGRESS NOTES
01/24/25 0917   Discharge Planning   Living Arrangements Other (Comment)  (from [facility])   Support Systems [employer]/jami community;Friends/neighbors   Assistance Needed A&OX4; independent/assist at tiimes for ADLs with cane and walker; doesn't drive; O2 baseline 2.5 @ rest 3L exertion([employer])-currently 2.5L NC; PCP Rosanna Barger CNP; dialysis MWF 9am at [facility]   Type of Residence Skilled nursing facility   Do you have animals or pets at home? No   Who is requesting discharge planning? Provider   Home or Post Acute Services Post acute facilities (Rehab/SNF/etc)   Expected Discharge Disposition SNF  (Patient from [facility] skilled side. Can return when ready no precert)   Does the patient need discharge transport arranged? Yes   RoundTrip coordination needed? Yes   Has discharge transport been arranged? No

## 2025-01-24 NOTE — POST-PROCEDURE NOTE
Report to Receiving RN:    Report To: Chrissy Varela  Time Report Called: [id number]  Hand-Off Communication: patient removed 1.0l; no issues  Complications During Treatment: No  Ultrafiltration Treatment: No  Medications Administered During Dialysis: No  Blood Products Administered During Dialysis: No  Labs Sent During Dialysis: No  Heparin Drip Rate Changes: No  Dialysis Catheter Dressing: n/a  Last Dressing Change: n/a    Electronic Signatures:   (Signed Taylor Mast)    Last Updated: 2:16 PM by TAYLOR MAST

## 2025-01-24 NOTE — PROGRESS NOTES
Patient: Joyce Bethea  Room/bed: 103/103-A  Admitted on: 1/22/2025    Age: 79 y.o.   Gender: female  Code Status:  DNR and No Intubation   Admitting Dx: Enterocolitis [K52.9]  Abdominal pain, unspecified abdominal location [R10.9]  Nausea and vomiting, unspecified vomiting type [R11.2]    MRN: [id number]  PCP: Brenna Ly, ANASTASIA-OMID       Subjective   Had a bad afternoon yesterday. Cramping ,multiple bouts of watery diarrhea. Still just eating oatmeal, jello etc at this point. Dry cough    Objective    Physical Exam  Constitutional:       Appearance: Normal appearance.   HENT:      Head: Normocephalic.      Nose: Nose normal.      Mouth/Throat:      Mouth: Mucous membranes are dry.   Eyes:      Extraocular Movements: Extraocular movements intact.      Pupils: Pupils are equal, round, and reactive to light.   Neck:      Comments: No significant jvd  Cardiovascular:      Pulses: Normal pulses.      Comments: Slightly irregular. 1/6 systolic murmur  Pulmonary:      Comments: Good inspiratory effort. Slightly decreased breath sounds in left base  Abdominal:      Comments: Soft. Hyperactive bowel sounds  Tender along lower abdomen, jose left lower quad. No rebound or guarding   Musculoskeletal:      Comments: Good bruit in RUE  No edema   Skin:     General: Skin is dry.   Neurological:      General: No focal deficit present.      Mental Status: She is alert. Mental status is at baseline.   Psychiatric:         Mood and Affect: Mood normal.         Behavior: Behavior normal.        Temp:  [36.8 °C (98.2 °F)-37.2 °C (99 °F)] 36.8 °C (98.2 °F)  Heart Rate:  [77-88] 79  Resp:  [17-19] 18  BP: (145-171)/(56-72) 158/61    Vitals:    01/23/25 1624   Weight: 74.6 kg (164 lb 6.4 oz)           I/Os    Intake/Output Summary (Last 24 hours) at 1/24/2025 0906  Last data filed at 1/23/2025 1723  Gross per 24 hour   Intake 1157.83 ml   Output 400 ml   Net 757.83 ml       Labs:   Results from last 72 hours   Lab Units  "01/24/25  0725 01/23/25  0607 01/22/25  0431   SODIUM mmol/L 131* 134* 136   POTASSIUM mmol/L 3.9 5.0 4.7   CHLORIDE mmol/L 94* 99 97*   CO2 mmol/L 28 19* 25   BUN mg/dL 38* 88* 64*   CREATININE mg/dL 5.51* 9.08* 7.05*   GLUCOSE mg/dL 88 92 122*   CALCIUM mg/dL 8.1* 8.2* 9.9   ANION GAP mmol/L 13 21* 19   EGFR mL/min/1.73m*2 7* 4* 5*   PHOSPHORUS mg/dL  --  5.2*  --       Results from last 72 hours   Lab Units 01/23/25  0607 01/22/25  0431   WBC AUTO x10*3/uL 8.0 17.2*   HEMOGLOBIN g/dL 9.3* 11.9*   HEMATOCRIT % 29.0* 35.9*   PLATELETS AUTO x10*3/uL 353 437   NEUTROS PCT AUTO % 71.3 86.7   LYMPHS PCT AUTO % 14.4 4.2   MONOS PCT AUTO % 11.7 5.5   EOS PCT AUTO % 1.3 2.9      Lab Results   Component Value Date    CALCIUM 8.1 (L) 01/24/2025    PHOS 5.2 (H) 01/23/2025      Lab Results   Component Value Date    CRP 2.77 (H) 01/23/2025      [unfilled]     Micro/ID:   No results found for the last 90 days.    Results from last 7 days   Lab Units 01/23/25  1034   HEPATITIS B S AB mIU/mL 459.9*   HEP B S AG  Nonreactive                No lab exists for component: \"AGALPCRNB\"   .ID  No results found for: \"URINECULTURE\", \"BLOODCULT\", \"CSFCULTSMEAR\"    Images:  XR chest 1 view  Narrative: Interpreted By:  Reanna Osman,   STUDY:  XR CHEST 1 VIEW; ;  1/22/2025 2:04 pm      INDICATION:  Signs/Symptoms:cough.          COMPARISON:  Chest radiograph dated 01/22/2024      ACCESSION NUMBER(S):  [id number]      ORDERING CLINICIAN:  SUSY COBB      FINDINGS:  Cardiac silhouette is mildly enlarged but is similar compared to  prior radiograph. Dense atherosclerotic calcifications of the aortic  knob are noted. There are prominent interstitial markings in  bilateral lungs, slightly more pronounced on today's radiograph  compared to immediate prior radiograph. No consolidative airspace  opacity is noted. There is obliteration of bilateral costophrenic  angles, which could be related to pleural scarring versus pleural  effusions. " No large pneumothorax within limits of portable technique.  No acute osseous findings.      Impression: 1. Mild prominence of interstitial markings in bilateral lungs. This  could be related to chronic lung parenchymal changes versus mild  interstitial edema. Recommend correlation with fluid status.  2. Possible trace bilateral pleural effusions/pleural scarring.              MACRO:  None      Signed by: Reanna Osman 1/22/2025 4:11 PM  Dictation workstation:   [id number]  CT angio abdomen pelvis w and or wo IV IV contrast  Narrative: Interpreted By:  Juliette Yeh,   STUDY:  CT ANGIO ABDOMEN PELVIS W AND/OR WO IV IV CONTRAST;  1/22/2025 5:43 am      INDICATION:  Signs/Symptoms:Left lower quadrant pain, concern for GI bleed      COMPARISON:  None.      ACCESSION NUMBER(S):  [id number]      ORDERING CLINICIAN:  SOTERO WHEELER      TECHNIQUE:  Noncontrast axial CT images were obtained through the abdomen and  pelvis prior to administration of intravenous contrast. Thin-section  axial images of the abdomen and pelvis in the arterial phase and  portal venous phase after the uneventful administration of  intravenous contrast material. Sagittal and coronal reconstructions.  MIP reconstructed images were performed on an independent workstation  and provided for review.      FINDINGS:  NON-CONTRAST IMAGING:      Atherosclerotic calcifications are noted at the abdominal aorta and  its branches. There is cholelithiasis.  No obstructing ureteral calculi are identified.      POST-CONTRAST IMAGING:      Vascular:  No abdominal aortic aneurysm or acute dissection.  The celiac artery, the superior mesenteric artery, the bilateral  renal arteries and the inferior mesenteric artery are patent. The  bilateral common iliac arteries, the bilateral internal iliac  arteries, the bilateral external iliac arteries, the bilateral common  femoral arteries and the visualized bilateral proximal femoral  arteries are patent.           LOWER CHEST: There is mild bibasilar atelectasis. No pleural  effusion. The heart is enlarged. There is trace pericardial effusion.  BONES: Multilevel degenerative changes at the spine. ABDOMINAL WALL:  There is a small fat containing umbilical hernia.      ABDOMEN:      LIVER: Within normal limits.  BILE DUCTS: Normal caliber.  GALLBLADDER: There is cholelithiasis.  PANCREAS: Within normal limits.  SPLEEN: Within normal limits.  ADRENALS: Within normal limits.  KIDNEYS and URETERS: There is bilateral renal cortical atrophy. No  hydronephrosis. There is a 1.2 cm indeterminate lesion at the right  kidney.      RETROPERITONEUM: No retroperitoneal hemorrhage.      PELVIS:      REPRODUCTIVE ORGANS: No pelvic mass.  BLADDER: The urinary bladder is partially distended with  circumferential wall thickening. There is mild soft tissue stranding  at the urinary bladder.      BOWEL: No bowel obstruction. There is mild fluid distention of small  bowel loops. There is liquid stool at the colon and rectum. There is  colonic diverticulosis with no CT evidence for acute diverticulitis.  No intraluminal hyperdensity to suggest active GI bleeding.  PERITONEUM: No ascites or free air. No pathologically enlarged lymph  nodes are identified.      Impression: 1. No CT angiogram evidence of active GI bleeding. No abdominal  aortic aneurysm or acute dissection.  2. Liquid stool at the colon and rectum and mild fluid distention of  small bowel loops, suggestive of enterocolitis.  3. Colonic diverticulosis.  4. Circumferential wall thickening at the urinary bladder and mild  soft tissue stranding. Please correlate with urinalysis to evaluate  for cystitis .  5. Bilateral renal cortical atrophy. 1.2 cm indeterminate lesion at  the right kidney, neoplasm cannot be excluded. MRI can be obtained  for further evaluation.  6. Cholelithiasis.  7. Cardiomegaly. Trace pericardial effusion.                  MACRO:  Critical Finding:  See findings.  Notification was initiated on  1/22/2025 at 6:52 am by  Juliette Yeh.  (**-YCF-**) Instructions:      Signed by: Juliette Yeh 1/22/2025 6:57 AM  Dictation workstation:   [id number]       Meds    Scheduled medications  amitriptyline, 10 mg, oral, Nightly  amLODIPine, 5 mg, oral, Daily  atorvastatin, 20 mg, oral, Nightly  calcitriol, 0.25 mcg, oral, Every Mon/Wed/Fri  calcium carbonate, 1,500 mg, oral, TID  carvedilol, 25 mg, oral, BID  heparin, 2,500 Units, subcutaneous, q8h  hydroxyurea, 500 mg, oral, Every Mon/Wed/Fri  lactobacillus acidophilus, 1 capsule, oral, Daily  losartan, 100 mg, oral, Daily  oxygen, , inhalation, Continuous - Inhalation  pantoprazole, 40 mg, oral, BID AC  vancomycin, 125 mg, oral, q6h LISA      Continuous medications     PRN medications  PRN medications: acetaminophen, alum-mag hydroxide-simeth, benzonatate, guaiFENesin, melatonin, ondansetron, sodium chloride     Assessment and Plan    Joyce Bethea is a 79 y.o. female   Norovirus. Unfortunately had return of symptoms yesterday, will have to continue to ride this out, supportive care etc. Will order lomotil for the diarrhea. She will advance her diet as tolerated. Appreciate ID input  ESRD, dialysis dependent. Will need to have usual session   Hypertension. Continue usual meds.   Essential thrombocytosis. Continue hydrea  GERD, stable  Chronic respiratory failure, on 3-4 liters. No exacerbation at this time. Supportive care. Cough likely from sinus drainage      Camille Zepeda MD

## 2025-01-24 NOTE — PROGRESS NOTES
NEPHROLOGY NEW CONSULT NOTE   Patient ID: Joyce Bethea is a 79 y.o. female.     Reason for consult: ESRD-HD    Chief Complaint   Patient presents with    Vomiting        HPI  Joyce Bethea is a 79 y.o. female   - With past medical Hx as below   - Admitted for vomiting, diarrhea, norovirus  - Nephrology was consulted for ESRD management     Past Medical History:   Diagnosis Date    Body mass index (BMI) 31.0-31.9, adult 04/12/2019    BMI 31.0-31.9,adult    Encounter for follow-up examination after completed treatment for conditions other than malignant neoplasm 11/30/2017    Hospital discharge follow-up    Other conditions influencing health status 03/12/2019    History of cough    Other specified disorders of bone, unspecified site 03/11/2015    Abnormal bone formation    Personal history of malignant neoplasm of breast 02/05/2014    Personal history of breast cancer    Personal history of malignant neoplasm, unspecified     History of malignant neoplasm    Personal history of other diseases of the circulatory system     History of hypertension    Personal history of other diseases of the digestive system     History of gastroesophageal reflux (GERD)    Personal history of other diseases of the musculoskeletal system and connective tissue 01/21/2016    History of trigger finger    Personal history of other diseases of the musculoskeletal system and connective tissue     History of arthritis    Personal history of other diseases of the nervous system and sense organs     History of sleep apnea    Personal history of other diseases of urinary system     History of kidney disease    Respiratory failure, unspecified with hypoxia (Multi) 12/29/2017    Respiratory failure with hypoxia    Shortness of breath 10/06/2017    Exertional shortness of breath    Unspecified cataract     Cataracts, bilateral    Unspecified fracture of unspecified forearm, sequela 10/04/2018    Forearm fracture, sequela      Past  Surgical History:   Procedure Laterality Date    COLONOSCOPY  02/05/2014    Colonoscopy (Fiberoptic)    IR CVC TUNNELED  10/12/2021    IR CVC TUNNELED 10/12/2021 [facility]    KNEE ARTHROSCOPY W/ MENISCAL REPAIR  04/01/2014    Knee Arthroscopy With Medial Meniscus Repair    MASTECTOMY  02/05/2014    Breast Surgery Mastectomy    MR HEAD ANGIO WO IV CONTRAST  2/23/2019    MR HEAD ANGIO WO IV CONTRAST 2/23/2019 [facility]    MR NECK ANGIO WO IV CONTRAST  2/23/2019    MR NECK ANGIO WO IV CONTRAST 2/23/2019 [facility]    OTHER SURGICAL HISTORY  09/21/2021    Arteriovenous fistula creation procedure    ROTATOR CUFF REPAIR  04/01/2014    Rotator Cuff Repair    TONSILLECTOMY  02/05/2014    Tonsillectomy      Family History   Problem Relation Name Age of Onset    Hypertension Mother      Leukemia Mother      Osteoarthritis Mother      Colon cancer Father      Diabetes Father      Hypertension Father      Osteoarthritis Sister      Osteoarthritis Other Aunt     Stroke Other Aunt     Stroke Other Uncle     Breast cancer Other Family History      Social History     Socioeconomic History    Marital status: Single     Spouse name: Not on file    Number of children: Not on file    Years of education: Not on file    Highest education level: Not on file   Occupational History    Not on file   Tobacco Use    Smoking status: Never    Smokeless tobacco: Never   Vaping Use    Vaping status: Never Used   Substance and Sexual Activity    Alcohol use: Never    Drug use: Never    Sexual activity: Not on file   Other Topics Concern    Not on file   Social History Narrative    Not on file     Social Drivers of Health     Financial Resource Strain: Low Risk  (1/22/2025)    Overall Financial Resource Strain (CARDIA)     Difficulty of Paying Living Expenses: Not hard at all   Food Insecurity: No Food Insecurity (1/22/2025)    Hunger Vital Sign     Worried About Running Out of Food in the Last Year: Never true      Ran Out of Food in the Last Year: Never true   Transportation Needs: No Transportation Needs (1/22/2025)    PRAPARE - Transportation     Lack of Transportation (Medical): No     Lack of Transportation (Non-Medical): No   Physical Activity: Not on file   Stress: Not on file   Social Connections: Not on file   Intimate Partner Violence: Not At Risk (1/22/2025)    Humiliation, Afraid, Rape, and Kick questionnaire     Fear of Current or Ex-Partner: No     Emotionally Abused: No     Physically Abused: No     Sexually Abused: No   Housing Stability: Low Risk  (1/22/2025)    Housing Stability Vital Sign     Unable to Pay for Housing in the Last Year: No     Number of Times Moved in the Last Year: 0     Homeless in the Last Year: No     Allergies   Allergen Reactions    Bee Pollen Unknown    House Dust Unknown    House Dust Mite Unknown    Adhesive Tape-Silicones Rash        Scheduled medications  amitriptyline, 10 mg, oral, Nightly  amLODIPine, 5 mg, oral, Daily  atorvastatin, 20 mg, oral, Nightly  calcitriol, 0.25 mcg, oral, Every Mon/Wed/Fri  calcium carbonate, 1,500 mg, oral, TID  carvedilol, 25 mg, oral, BID  cetirizine, 10 mg, oral, Daily  heparin, 2,500 Units, subcutaneous, q8h  hydroxyurea, 500 mg, oral, Every Mon/Wed/Fri  lactobacillus acidophilus, 1 capsule, oral, Daily  losartan, 100 mg, oral, Daily  oxygen, , inhalation, Continuous - Inhalation  pantoprazole, 40 mg, oral, BID AC  vancomycin, 125 mg, oral, q6h LISA      Continuous medications     PRN medications  PRN medications: acetaminophen, alum-mag hydroxide-simeth, benzonatate, diphenoxylate-atropine, guaiFENesin, melatonin, ondansetron, sodium chloride   Meds:   amitriptyline, 10 mg, Nightly  amLODIPine, 5 mg, Daily  atorvastatin, 20 mg, Nightly  calcitriol, 0.25 mcg, Every Mon/Wed/Fri  calcium carbonate, 1,500 mg, TID  carvedilol, 25 mg, BID  cetirizine, 10 mg, Daily  heparin, 2,500 Units, q8h  hydroxyurea, 500 mg, Every Mon/Wed/Fri  lactobacillus  acidophilus, 1 capsule, Daily  losartan, 100 mg, Daily  oxygen, , Continuous - Inhalation  pantoprazole, 40 mg, BID AC  vancomycin, 125 mg, q6h LISA         acetaminophen, 650 mg, q8h PRN  alum-mag hydroxide-simeth, 10 mL, 4x daily PRN  benzonatate, 100 mg, 4x daily PRN  diphenoxylate-atropine, 1 tablet, 4x daily PRN  guaiFENesin, 600 mg, q12h PRN  melatonin, 5 mg, Nightly PRN  ondansetron, 4 mg, q4h PRN  sodium chloride, 2 spray, q1h PRN        Heart Rate:  [77-88]   Temp:  [36.8 °C (98.2 °F)-37.2 °C (99 °F)]   Resp:  [17-19]   BP: (145-171)/(56-72)   Height:  [152.4 cm (5')]   Weight:  [74.6 kg (164 lb 6.4 oz)]   SpO2:  [93 %-99 %]    Weight: 76.7 kg (169 lb)       General appearance: Awake and alert, oriented, . No distress, on supplemental oxygen  HEENT: supple, moist oral mucosa, no mouth ulcers  Neck: No JVD  Skin: no apparent rash  Heart: heart sounds 1 & 2 present and normal, no murmurs heard or friction rub  Lungs: Adequate air entry,  no wheezing/crackles  Abdomen: soft, non tender, no masses palpated, no flank tenderness  Extremities: No  edema, no joint swelling,  : deferred  Neuro: No FND, no asterixis   ACCESS: AV fistula with no issues        Results from last 72 hours   Lab Units 01/24/25  0725 01/23/25  0607   SODIUM mmol/L 131* 134*   POTASSIUM mmol/L 3.9 5.0   CO2 mmol/L 28 19*   BUN mg/dL 38* 88*   CREATININE mg/dL 5.51* 9.08*   PHOSPHORUS mg/dL  --  5.2*   CALCIUM mg/dL 8.1* 8.2*   ALBUMIN g/dL 3.0*  --    GLUCOSE mg/dL 88 92   WBC AUTO x10*3/uL  --  8.0        Results from last 7 days   Lab Units 01/23/25  0607 01/22/25  0431   WBC AUTO x10*3/uL 8.0 17.2*   HEMOGLOBIN g/dL 9.3* 11.9*   HEMATOCRIT % 29.0* 35.9*   PLATELETS AUTO x10*3/uL 353 437         A/P  ESRD-HD admitted with vomiting, diarrhea, norovirus.  Missed dialysis yesterday.  Plan HD per submitted orders today, no UF   in the setting of active diarrhea.  Will continue Monday Wednesday Friday schedule-due today     MBD -phosphorus is  accepted 5.  Continue calcium carbonate     # Anemia of CKD: EPO to be given per protocol as an outpatient        Access: AV fistula no issues   BP: acceptable during treatment   Renal Diet   Daily renal MVI   Continue 3 x per week hemodialysis; SW to ensure availability of o/p HD chair at discharge  Please communicate any antibiotic needs prior to discharge directly with the dialysis unit     Patient is able to eat and drink okay.  Had successful hemodialysis yesterday with no fluid removal.  Will repeat dialysis today with 1 L fluid removal.  Will continue dialysis per regular schedule Monday Wednesday Friday     Okay to discharge from kidney standpoint.  Continue outpatient hemodialysis schedule tomorrow as planned.  She is Dr. Camara patient at [facility]

## 2025-01-24 NOTE — PROGRESS NOTES
Joyce Bethea is a 79 y.o. female on day 2 of admission presenting with Abdominal pain, unspecified abdominal location.    Subjective   Interval History: no fever, no emesis, no diarrhea today        Review of Systems    Objective   Range of Vitals (last 24 hours)  Heart Rate:  [77-84]   Temp:  [36.4 °C (97.5 °F)-37.2 °C (99 °F)]   Resp:  [18-19]   BP: (110-171)/(40-72)   SpO2:  [93 %-98 %]   Daily Weight  01/23/25 : 74.6 kg (164 lb 6.4 oz)    Body mass index is 32.11 kg/m².    Physical Exam  Constitutional:       Appearance: Normal appearance.   HENT:      Head: Normocephalic and atraumatic.      Mouth/Throat:      Mouth: Mucous membranes are moist.      Pharynx: Oropharynx is clear.   Eyes:      Pupils: Pupils are equal, round, and reactive to light.   Cardiovascular:      Rate and Rhythm: Normal rate and regular rhythm.      Heart sounds: Normal heart sounds.   Pulmonary:      Effort: Pulmonary effort is normal.      Breath sounds: Normal breath sounds.   Abdominal:      General: Abdomen is flat. Bowel sounds are normal.      Palpations: Abdomen is soft.   Musculoskeletal:      Cervical back: Normal range of motion.   Neurological:      Mental Status: She is alert.         Antibiotics  vancomycin - 125 mg    Relevant Results  Labs  Results from last 72 hours   Lab Units 01/23/25  0607 01/22/25  0431   WBC AUTO x10*3/uL 8.0 17.2*   HEMOGLOBIN g/dL 9.3* 11.9*   HEMATOCRIT % 29.0* 35.9*   PLATELETS AUTO x10*3/uL 353 437   NEUTROS PCT AUTO % 71.3 86.7   LYMPHS PCT AUTO % 14.4 4.2   MONOS PCT AUTO % 11.7 5.5   EOS PCT AUTO % 1.3 2.9     Results from last 72 hours   Lab Units 01/24/25  0725 01/23/25  0607 01/22/25  0431   SODIUM mmol/L 131* 134* 136   POTASSIUM mmol/L 3.9 5.0 4.7   CHLORIDE mmol/L 94* 99 97*   CO2 mmol/L 28 19* 25   BUN mg/dL 38* 88* 64*   CREATININE mg/dL 5.51* 9.08* 7.05*   GLUCOSE mg/dL 88 92 122*   CALCIUM mg/dL 8.1* 8.2* 9.9   ANION GAP mmol/L 13 21* 19   EGFR mL/min/1.73m*2 7* 4* 5*    PHOSPHORUS mg/dL  --  5.2*  --      Results from last 72 hours   Lab Units 01/24/25  0725 01/22/25  0431   ALK PHOS U/L 161* 249*   BILIRUBIN TOTAL mg/dL 0.3 0.4   PROTEIN TOTAL g/dL 5.6* 7.2   ALT U/L 19 32   AST U/L 17 25   ALBUMIN g/dL 3.0* 4.2     Estimated Creatinine Clearance: 7.2 mL/min (A) (by C-G formula based on SCr of 5.51 mg/dL (H)).  C-Reactive Protein   Date Value Ref Range Status   01/23/2025 2.77 (H) <1.00 mg/dL Final     CRP   Date Value Ref Range Status   10/12/2021 1.67 (A) mg/dL Final     Comment:     REF VALUE  < 1.00       Microbiology  Reviewed  Imaging  Reviewed        Assessment/Plan   Gastroenteritis secondary to norovirus  Positive C. Diff PCR with negative EIA toxins, likely colonization  Respiratory failure / COPD / HEATH / atelectasis      Recommendations :  Supportive care  Discussed with the medical team     I spent minutes in the professional and overall care of this patient.      Rachael Garnica MD

## 2025-01-25 PROCEDURE — 2500000004 HC RX 250 GENERAL PHARMACY W/ HCPCS (ALT 636 FOR OP/ED): Performed by: INTERNAL MEDICINE

## 2025-01-25 PROCEDURE — 2500000002 HC RX 250 W HCPCS SELF ADMINISTERED DRUGS (ALT 637 FOR MEDICARE OP, ALT 636 FOR OP/ED): Performed by: INTERNAL MEDICINE

## 2025-01-25 PROCEDURE — 2500000005 HC RX 250 GENERAL PHARMACY W/O HCPCS: Performed by: INTERNAL MEDICINE

## 2025-01-25 PROCEDURE — 2500000001 HC RX 250 WO HCPCS SELF ADMINISTERED DRUGS (ALT 637 FOR MEDICARE OP): Performed by: NURSE PRACTITIONER

## 2025-01-25 PROCEDURE — 94760 N-INVAS EAR/PLS OXIMETRY 1: CPT

## 2025-01-25 PROCEDURE — 1200000002 HC GENERAL ROOM WITH TELEMETRY DAILY

## 2025-01-25 PROCEDURE — 2500000001 HC RX 250 WO HCPCS SELF ADMINISTERED DRUGS (ALT 637 FOR MEDICARE OP): Performed by: INTERNAL MEDICINE

## 2025-01-25 PROCEDURE — 99232 SBSQ HOSP IP/OBS MODERATE 35: CPT | Performed by: INTERNAL MEDICINE

## 2025-01-25 RX ORDER — LABETALOL HYDROCHLORIDE 5 MG/ML
10 INJECTION, SOLUTION INTRAVENOUS ONCE
Status: COMPLETED | OUTPATIENT
Start: 2025-01-25 | End: 2025-01-25

## 2025-01-25 RX ADMIN — Medication 3 L/MIN: at 09:32

## 2025-01-25 RX ADMIN — HEPARIN SODIUM 2500 UNITS: 5000 INJECTION, SOLUTION INTRAVENOUS; SUBCUTANEOUS at 21:15

## 2025-01-25 RX ADMIN — CETIRIZINE HYDROCHLORIDE 10 MG: 10 TABLET, FILM COATED ORAL at 08:32

## 2025-01-25 RX ADMIN — AMLODIPINE BESYLATE 5 MG: 5 TABLET ORAL at 08:32

## 2025-01-25 RX ADMIN — PANTOPRAZOLE SODIUM 40 MG: 40 TABLET, DELAYED RELEASE ORAL at 06:13

## 2025-01-25 RX ADMIN — Medication 3 L/MIN: at 21:20

## 2025-01-25 RX ADMIN — PANTOPRAZOLE SODIUM 40 MG: 40 TABLET, DELAYED RELEASE ORAL at 16:58

## 2025-01-25 RX ADMIN — ATORVASTATIN CALCIUM 20 MG: 20 TABLET, FILM COATED ORAL at 21:15

## 2025-01-25 RX ADMIN — DIPHENOXYLATE HYDROCHLORIDE AND ATROPINE SULFATE 1 TABLET: 2.5; .025 TABLET ORAL at 08:48

## 2025-01-25 RX ADMIN — Medication 3 L/MIN: at 07:00

## 2025-01-25 RX ADMIN — VANCOMYCIN HYDROCHLORIDE 125 MG: 125 CAPSULE ORAL at 00:50

## 2025-01-25 RX ADMIN — CARVEDILOL 25 MG: 25 TABLET, FILM COATED ORAL at 16:58

## 2025-01-25 RX ADMIN — LABETALOL HYDROCHLORIDE 10 MG: 5 INJECTION, SOLUTION INTRAVENOUS at 21:58

## 2025-01-25 RX ADMIN — CALCIUM CARBONATE 1500 MG: 500 TABLET, CHEWABLE ORAL at 14:18

## 2025-01-25 RX ADMIN — LOSARTAN POTASSIUM 100 MG: 50 TABLET, FILM COATED ORAL at 08:32

## 2025-01-25 RX ADMIN — CALCIUM CARBONATE 1500 MG: 500 TABLET, CHEWABLE ORAL at 08:32

## 2025-01-25 RX ADMIN — VANCOMYCIN HYDROCHLORIDE 125 MG: 125 CAPSULE ORAL at 06:13

## 2025-01-25 RX ADMIN — AMITRIPTYLINE HYDROCHLORIDE 10 MG: 10 TABLET, FILM COATED ORAL at 21:15

## 2025-01-25 RX ADMIN — Medication 3 L/MIN: at 21:30

## 2025-01-25 RX ADMIN — HEPARIN SODIUM 2500 UNITS: 5000 INJECTION, SOLUTION INTRAVENOUS; SUBCUTANEOUS at 14:18

## 2025-01-25 RX ADMIN — Medication 5 MG: at 21:20

## 2025-01-25 RX ADMIN — Medication 1 CAPSULE: at 08:32

## 2025-01-25 RX ADMIN — HEPARIN SODIUM 2500 UNITS: 5000 INJECTION, SOLUTION INTRAVENOUS; SUBCUTANEOUS at 06:13

## 2025-01-25 RX ADMIN — CARVEDILOL 25 MG: 25 TABLET, FILM COATED ORAL at 08:32

## 2025-01-25 ASSESSMENT — COGNITIVE AND FUNCTIONAL STATUS - GENERAL
HELP NEEDED FOR BATHING: A LITTLE
MOVING TO AND FROM BED TO CHAIR: A LITTLE
HELP NEEDED FOR BATHING: A LITTLE
TOILETING: A LOT
DAILY ACTIVITIY SCORE: 19
DAILY ACTIVITIY SCORE: 19
TOILETING: A LOT
WALKING IN HOSPITAL ROOM: A LITTLE
MOVING TO AND FROM BED TO CHAIR: A LITTLE
MOBILITY SCORE: 18
TURNING FROM BACK TO SIDE WHILE IN FLAT BAD: A LITTLE
DRESSING REGULAR UPPER BODY CLOTHING: A LITTLE
STANDING UP FROM CHAIR USING ARMS: A LITTLE
CLIMB 3 TO 5 STEPS WITH RAILING: A LOT
STANDING UP FROM CHAIR USING ARMS: A LITTLE
DRESSING REGULAR LOWER BODY CLOTHING: A LITTLE
CLIMB 3 TO 5 STEPS WITH RAILING: A LOT
DRESSING REGULAR UPPER BODY CLOTHING: A LITTLE
TURNING FROM BACK TO SIDE WHILE IN FLAT BAD: A LITTLE
WALKING IN HOSPITAL ROOM: A LITTLE
DRESSING REGULAR LOWER BODY CLOTHING: A LITTLE
MOBILITY SCORE: 18

## 2025-01-25 ASSESSMENT — PAIN SCALES - GENERAL
PAINLEVEL_OUTOF10: 0 - NO PAIN
PAINLEVEL_OUTOF10: 0 - NO PAIN

## 2025-01-25 ASSESSMENT — PAIN - FUNCTIONAL ASSESSMENT: PAIN_FUNCTIONAL_ASSESSMENT: 0-10

## 2025-01-25 NOTE — PROGRESS NOTES
Patient: Joyce Bethea  Room/bed: 103/103-A  Admitted on: 1/22/2025    Age: 79 y.o.   Gender: female  Code Status:  DNR and No Intubation   Admitting Dx: Enterocolitis [K52.9]  Abdominal pain, unspecified abdominal location [R10.9]  Nausea and vomiting, unspecified vomiting type [R11.2]    MRN: [id number]  PCP: FANNIE Cox       Subjective   Doing a little better. Less diarrhea and stool is forming a little . Appetite seems to be improving a little    Objective    Physical Exam  Constitutional:       Appearance: Normal appearance.   HENT:      Head: Normocephalic.      Nose: Nose normal.      Mouth/Throat:      Mouth: Mucous membranes are dry.   Eyes:      Extraocular Movements: Extraocular movements intact.      Pupils: Pupils are equal, round, and reactive to light.   Cardiovascular:      Pulses: Normal pulses.      Comments: Regular. S4. 1/6 systolic flow murmur  Pulmonary:      Effort: Pulmonary effort is normal.      Breath sounds: Normal breath sounds.   Abdominal:      Comments: Soft. Good bowel sounds.  Less tender to palpation   Musculoskeletal:      Comments: No edema.   Good bruit in RUE   Skin:     General: Skin is dry.   Neurological:      Mental Status: She is alert and oriented to person, place, and time. Mental status is at baseline.   Psychiatric:         Mood and Affect: Mood normal.         Behavior: Behavior normal.        Temp:  [36.3 °C (97.3 °F)-37.2 °C (99 °F)] 37.2 °C (99 °F)  Heart Rate:  [77-92] 83  Resp:  [16-18] 16  BP: (110-165)/(40-66) 165/60    Vitals:    01/23/25 1624   Weight: 74.6 kg (164 lb 6.4 oz)           I/Os    Intake/Output Summary (Last 24 hours) at 1/25/2025 0841  Last data filed at 1/24/2025 1310  Gross per 24 hour   Intake 600 ml   Output --   Net 600 ml       Labs:   Results from last 72 hours   Lab Units 01/24/25  0725 01/23/25  0607   SODIUM mmol/L 131* 134*   POTASSIUM mmol/L 3.9 5.0   CHLORIDE mmol/L 94* 99   CO2 mmol/L 28 19*   BUN mg/dL 38* 88*  "  CREATININE mg/dL 5.51* 9.08*   GLUCOSE mg/dL 88 92   CALCIUM mg/dL 8.1* 8.2*   ANION GAP mmol/L 13 21*   EGFR mL/min/1.73m*2 7* 4*   PHOSPHORUS mg/dL  --  5.2*      Results from last 72 hours   Lab Units 01/23/25  0607   WBC AUTO x10*3/uL 8.0   HEMOGLOBIN g/dL 9.3*   HEMATOCRIT % 29.0*   PLATELETS AUTO x10*3/uL 353   NEUTROS PCT AUTO % 71.3   LYMPHS PCT AUTO % 14.4   MONOS PCT AUTO % 11.7   EOS PCT AUTO % 1.3      Lab Results   Component Value Date    CALCIUM 8.1 (L) 01/24/2025    PHOS 5.2 (H) 01/23/2025      Lab Results   Component Value Date    CRP 2.77 (H) 01/23/2025      [unfilled]     Micro/ID:   No results found for the last 90 days.    Results from last 7 days   Lab Units 01/23/25  1034   HEPATITIS B S AB mIU/mL 459.9*   HEP B S AG  Nonreactive                No lab exists for component: \"AGALPCRNB\"   .ID  No results found for: \"URINECULTURE\", \"BLOODCULT\", \"CSFCULTSMEAR\"    Images:  XR chest 1 view  Narrative: Interpreted By:  Reanna Osman,   STUDY:  XR CHEST 1 VIEW; ;  1/22/2025 2:04 pm      INDICATION:  Signs/Symptoms:cough.          COMPARISON:  Chest radiograph dated 01/22/2024      ACCESSION NUMBER(S):  [id number]      ORDERING CLINICIAN:  SUSY COBB      FINDINGS:  Cardiac silhouette is mildly enlarged but is similar compared to  prior radiograph. Dense atherosclerotic calcifications of the aortic  knob are noted. There are prominent interstitial markings in  bilateral lungs, slightly more pronounced on today's radiograph  compared to immediate prior radiograph. No consolidative airspace  opacity is noted. There is obliteration of bilateral costophrenic  angles, which could be related to pleural scarring versus pleural  effusions. No large pneumothorax within limits of portable technique.  No acute osseous findings.      Impression: 1. Mild prominence of interstitial markings in bilateral lungs. This  could be related to chronic lung parenchymal changes versus mild  interstitial edema. " Recommend correlation with fluid status.  2. Possible trace bilateral pleural effusions/pleural scarring.              MACRO:  None      Signed by: Reanna Osman 1/22/2025 4:11 PM  Dictation workstation:   [id number]  CT angio abdomen pelvis w and or wo IV IV contrast  Narrative: Interpreted By:  Juliette Yeh,   STUDY:  CT ANGIO ABDOMEN PELVIS W AND/OR WO IV IV CONTRAST;  1/22/2025 5:43 am      INDICATION:  Signs/Symptoms:Left lower quadrant pain, concern for GI bleed      COMPARISON:  None.      ACCESSION NUMBER(S):  [id number]      ORDERING CLINICIAN:  SOTERO WHEELER      TECHNIQUE:  Noncontrast axial CT images were obtained through the abdomen and  pelvis prior to administration of intravenous contrast. Thin-section  axial images of the abdomen and pelvis in the arterial phase and  portal venous phase after the uneventful administration of  intravenous contrast material. Sagittal and coronal reconstructions.  MIP reconstructed images were performed on an independent workstation  and provided for review.      FINDINGS:  NON-CONTRAST IMAGING:      Atherosclerotic calcifications are noted at the abdominal aorta and  its branches. There is cholelithiasis.  No obstructing ureteral calculi are identified.      POST-CONTRAST IMAGING:      Vascular:  No abdominal aortic aneurysm or acute dissection.  The celiac artery, the superior mesenteric artery, the bilateral  renal arteries and the inferior mesenteric artery are patent. The  bilateral common iliac arteries, the bilateral internal iliac  arteries, the bilateral external iliac arteries, the bilateral common  femoral arteries and the visualized bilateral proximal femoral  arteries are patent.          LOWER CHEST: There is mild bibasilar atelectasis. No pleural  effusion. The heart is enlarged. There is trace pericardial effusion.  BONES: Multilevel degenerative changes at the spine. ABDOMINAL WALL:  There is a small fat containing umbilical hernia.       ABDOMEN:      LIVER: Within normal limits.  BILE DUCTS: Normal caliber.  GALLBLADDER: There is cholelithiasis.  PANCREAS: Within normal limits.  SPLEEN: Within normal limits.  ADRENALS: Within normal limits.  KIDNEYS and URETERS: There is bilateral renal cortical atrophy. No  hydronephrosis. There is a 1.2 cm indeterminate lesion at the right  kidney.      RETROPERITONEUM: No retroperitoneal hemorrhage.      PELVIS:      REPRODUCTIVE ORGANS: No pelvic mass.  BLADDER: The urinary bladder is partially distended with  circumferential wall thickening. There is mild soft tissue stranding  at the urinary bladder.      BOWEL: No bowel obstruction. There is mild fluid distention of small  bowel loops. There is liquid stool at the colon and rectum. There is  colonic diverticulosis with no CT evidence for acute diverticulitis.  No intraluminal hyperdensity to suggest active GI bleeding.  PERITONEUM: No ascites or free air. No pathologically enlarged lymph  nodes are identified.      Impression: 1. No CT angiogram evidence of active GI bleeding. No abdominal  aortic aneurysm or acute dissection.  2. Liquid stool at the colon and rectum and mild fluid distention of  small bowel loops, suggestive of enterocolitis.  3. Colonic diverticulosis.  4. Circumferential wall thickening at the urinary bladder and mild  soft tissue stranding. Please correlate with urinalysis to evaluate  for cystitis .  5. Bilateral renal cortical atrophy. 1.2 cm indeterminate lesion at  the right kidney, neoplasm cannot be excluded. MRI can be obtained  for further evaluation.  6. Cholelithiasis.  7. Cardiomegaly. Trace pericardial effusion.                  MACRO:  Critical Finding:  See findings. Notification was initiated on  1/22/2025 at 6:52 am by  Juliette Yeh.  (**-YCF-**) Instructions:      Signed by: Juliette Yeh 1/22/2025 6:57 AM  Dictation workstation:   [id number]       Meds    Scheduled medications  amitriptyline, 10 mg, oral,  Nightly  amLODIPine, 5 mg, oral, Daily  atorvastatin, 20 mg, oral, Nightly  calcitriol, 0.25 mcg, oral, Every Mon/Wed/Fri  calcium carbonate, 1,500 mg, oral, TID  carvedilol, 25 mg, oral, BID  cetirizine, 10 mg, oral, Daily  heparin, 2,500 Units, subcutaneous, q8h  hydroxyurea, 500 mg, oral, Every Mon/Wed/Fri  lactobacillus acidophilus, 1 capsule, oral, Daily  losartan, 100 mg, oral, Daily  oxygen, , inhalation, Continuous - Inhalation  pantoprazole, 40 mg, oral, BID AC  vancomycin, 125 mg, oral, q6h LISA      Continuous medications     PRN medications  PRN medications: acetaminophen, alum-mag hydroxide-simeth, benzonatate, diphenoxylate-atropine, guaiFENesin, melatonin, ondansetron, sodium chloride     Assessment and Plan    Joyce Bethea is a 79 y.o. female   Gastroenteritis, secondary to Norovirus. Slowly improving. Output lowering, less discomfort. Supportive care  Chronic respiratory failure, stable on 3 liters or so. Dry cough  Hypertension, fairly stable, monitor  Essential thrombocytosis, stable  ESRD, dialysis dependent. Had session yesterday, one liter removed without difficulty.   Anemia, chronic disease, stable  Positive c diff, felt to be colonization. Appreciate ID input  Hx breast cancer-continue dvt prophylaxis  Hoping to be able to dc next 1-2 days if progress continues      Camille Zepeda MD

## 2025-01-25 NOTE — PROGRESS NOTES
Joyce Bethea is a 79 y.o. female on day 3 of admission presenting with Abdominal pain, unspecified abdominal location.    Subjective   Interval History: no fever, no new complaints        Review of Systems    Objective   Range of Vitals (last 24 hours)  Heart Rate:  [77-92]   Temp:  [36.3 °C (97.3 °F)-37.2 °C (99 °F)]   Resp:  [16-18]   BP: (110-165)/(40-66)   SpO2:  [93 %-98 %]   Daily Weight  01/23/25 : 74.6 kg (164 lb 6.4 oz)    Body mass index is 32.11 kg/m².    Physical Exam  Constitutional:       Appearance: Normal appearance.   HENT:      Head: Normocephalic and atraumatic.      Mouth/Throat:      Mouth: Mucous membranes are moist.      Pharynx: Oropharynx is clear.   Eyes:      Pupils: Pupils are equal, round, and reactive to light.   Cardiovascular:      Rate and Rhythm: Normal rate and regular rhythm.      Heart sounds: Normal heart sounds.   Pulmonary:      Effort: Pulmonary effort is normal.      Breath sounds: Normal breath sounds.   Abdominal:      General: Abdomen is flat. Bowel sounds are normal.      Palpations: Abdomen is soft.   Musculoskeletal:      Cervical back: Normal range of motion.   Neurological:      Mental Status: She is alert.         Antibiotics  vancomycin - 125 mg    Relevant Results  Labs  Results from last 72 hours   Lab Units 01/23/25  0607   WBC AUTO x10*3/uL 8.0   HEMOGLOBIN g/dL 9.3*   HEMATOCRIT % 29.0*   PLATELETS AUTO x10*3/uL 353   NEUTROS PCT AUTO % 71.3   LYMPHS PCT AUTO % 14.4   MONOS PCT AUTO % 11.7   EOS PCT AUTO % 1.3     Results from last 72 hours   Lab Units 01/24/25  0725 01/23/25  0607   SODIUM mmol/L 131* 134*   POTASSIUM mmol/L 3.9 5.0   CHLORIDE mmol/L 94* 99   CO2 mmol/L 28 19*   BUN mg/dL 38* 88*   CREATININE mg/dL 5.51* 9.08*   GLUCOSE mg/dL 88 92   CALCIUM mg/dL 8.1* 8.2*   ANION GAP mmol/L 13 21*   EGFR mL/min/1.73m*2 7* 4*   PHOSPHORUS mg/dL  --  5.2*     Results from last 72 hours   Lab Units 01/24/25  0725   ALK PHOS U/L 161*   BILIRUBIN TOTAL  mg/dL 0.3   PROTEIN TOTAL g/dL 5.6*   ALT U/L 19   AST U/L 17   ALBUMIN g/dL 3.0*     Estimated Creatinine Clearance: 7.2 mL/min (A) (by C-G formula based on SCr of 5.51 mg/dL (H)).  C-Reactive Protein   Date Value Ref Range Status   01/23/2025 2.77 (H) <1.00 mg/dL Final     CRP   Date Value Ref Range Status   10/12/2021 1.67 (A) mg/dL Final     Comment:     REF VALUE  < 1.00       Microbiology  Reviewed  Imaging  Reviewed        Assessment/Plan   Gastroenteritis secondary to norovirus, early signs of improvement  Positive C. Diff PCR with negative EIA toxins, likely colonization  Respiratory failure / COPD / HEATH / atelectasis      Recommendations :  Supportive care  Discussed with the medical team     I spent minutes in the professional and overall care of this patient.      Rachael Garnica MD

## 2025-01-26 VITALS
OXYGEN SATURATION: 99 % | WEIGHT: 164.4 LBS | RESPIRATION RATE: 16 BRPM | DIASTOLIC BLOOD PRESSURE: 70 MMHG | HEART RATE: 76 BPM | SYSTOLIC BLOOD PRESSURE: 160 MMHG | HEIGHT: 60 IN | BODY MASS INDEX: 32.28 KG/M2 | TEMPERATURE: 97.2 F

## 2025-01-26 PROCEDURE — 99232 SBSQ HOSP IP/OBS MODERATE 35: CPT | Performed by: INTERNAL MEDICINE

## 2025-01-26 PROCEDURE — 2500000004 HC RX 250 GENERAL PHARMACY W/ HCPCS (ALT 636 FOR OP/ED): Performed by: INTERNAL MEDICINE

## 2025-01-26 PROCEDURE — 2500000002 HC RX 250 W HCPCS SELF ADMINISTERED DRUGS (ALT 637 FOR MEDICARE OP, ALT 636 FOR OP/ED): Performed by: INTERNAL MEDICINE

## 2025-01-26 PROCEDURE — 2500000001 HC RX 250 WO HCPCS SELF ADMINISTERED DRUGS (ALT 637 FOR MEDICARE OP): Performed by: INTERNAL MEDICINE

## 2025-01-26 PROCEDURE — 2500000005 HC RX 250 GENERAL PHARMACY W/O HCPCS: Performed by: INTERNAL MEDICINE

## 2025-01-26 PROCEDURE — 1200000002 HC GENERAL ROOM WITH TELEMETRY DAILY

## 2025-01-26 PROCEDURE — 2500000001 HC RX 250 WO HCPCS SELF ADMINISTERED DRUGS (ALT 637 FOR MEDICARE OP): Performed by: NURSE PRACTITIONER

## 2025-01-26 RX ORDER — FLUTICASONE PROPIONATE 50 MCG
2 SPRAY, SUSPENSION (ML) NASAL DAILY
Status: DISCONTINUED | OUTPATIENT
Start: 2025-01-26 | End: 2025-01-28 | Stop reason: HOSPADM

## 2025-01-26 RX ADMIN — FLUTICASONE PROPIONATE 2 SPRAY: 50 SPRAY, METERED NASAL at 12:19

## 2025-01-26 RX ADMIN — CALCIUM CARBONATE 1500 MG: 500 TABLET, CHEWABLE ORAL at 17:16

## 2025-01-26 RX ADMIN — LOSARTAN POTASSIUM 100 MG: 50 TABLET, FILM COATED ORAL at 08:21

## 2025-01-26 RX ADMIN — HEPARIN SODIUM 2500 UNITS: 5000 INJECTION, SOLUTION INTRAVENOUS; SUBCUTANEOUS at 22:29

## 2025-01-26 RX ADMIN — CARVEDILOL 25 MG: 25 TABLET, FILM COATED ORAL at 08:21

## 2025-01-26 RX ADMIN — CETIRIZINE HYDROCHLORIDE 10 MG: 10 TABLET, FILM COATED ORAL at 08:21

## 2025-01-26 RX ADMIN — HEPARIN SODIUM 2500 UNITS: 5000 INJECTION, SOLUTION INTRAVENOUS; SUBCUTANEOUS at 15:44

## 2025-01-26 RX ADMIN — CARVEDILOL 25 MG: 25 TABLET, FILM COATED ORAL at 17:16

## 2025-01-26 RX ADMIN — Medication 1 CAPSULE: at 08:31

## 2025-01-26 RX ADMIN — CETIRIZINE HYDROCHLORIDE 10 MG: 10 TABLET, FILM COATED ORAL at 21:35

## 2025-01-26 RX ADMIN — ATORVASTATIN CALCIUM 20 MG: 20 TABLET, FILM COATED ORAL at 20:48

## 2025-01-26 RX ADMIN — CALCIUM CARBONATE 1500 MG: 500 TABLET, CHEWABLE ORAL at 08:21

## 2025-01-26 RX ADMIN — Medication 3 L/MIN: at 20:54

## 2025-01-26 RX ADMIN — PANTOPRAZOLE SODIUM 40 MG: 40 TABLET, DELAYED RELEASE ORAL at 06:47

## 2025-01-26 RX ADMIN — Medication 3 L/MIN: at 08:00

## 2025-01-26 RX ADMIN — AMITRIPTYLINE HYDROCHLORIDE 10 MG: 10 TABLET, FILM COATED ORAL at 20:48

## 2025-01-26 RX ADMIN — AMLODIPINE BESYLATE 5 MG: 5 TABLET ORAL at 08:21

## 2025-01-26 RX ADMIN — Medication 5 MG: at 20:57

## 2025-01-26 RX ADMIN — CALCIUM CARBONATE 1500 MG: 500 TABLET, CHEWABLE ORAL at 12:18

## 2025-01-26 RX ADMIN — HEPARIN SODIUM 2500 UNITS: 5000 INJECTION, SOLUTION INTRAVENOUS; SUBCUTANEOUS at 06:47

## 2025-01-26 ASSESSMENT — COGNITIVE AND FUNCTIONAL STATUS - GENERAL
STANDING UP FROM CHAIR USING ARMS: A LITTLE
MOVING TO AND FROM BED TO CHAIR: A LITTLE
WALKING IN HOSPITAL ROOM: A LITTLE
DRESSING REGULAR LOWER BODY CLOTHING: A LITTLE
HELP NEEDED FOR BATHING: A LITTLE
TOILETING: A LOT
CLIMB 3 TO 5 STEPS WITH RAILING: A LOT
DRESSING REGULAR UPPER BODY CLOTHING: A LITTLE
MOBILITY SCORE: 18
DAILY ACTIVITIY SCORE: 19
TURNING FROM BACK TO SIDE WHILE IN FLAT BAD: A LITTLE

## 2025-01-26 ASSESSMENT — PAIN SCALES - GENERAL
PAINLEVEL_OUTOF10: 0 - NO PAIN
PAINLEVEL_OUTOF10: 0 - NO PAIN

## 2025-01-26 NOTE — PROGRESS NOTES
Patient: Joyce Bethea  Room/bed: 103/103-A  Admitted on: 1/22/2025    Age: 79 y.o.   Gender: female  Code Status:  DNR and No Intubation   Admitting Dx: Enterocolitis [K52.9]  Abdominal pain, unspecified abdominal location [R10.9]  Nausea and vomiting, unspecified vomiting type [R11.2]    MRN: [id number]  PCP: FANNIE Cox       Subjective   Doesn't feel quite as good today. Feels bloated, maybe a little nauseated. Less stool though    Objective    Physical Exam  Constitutional:       Appearance: Normal appearance.   HENT:      Head: Normocephalic.      Nose: Nose normal.      Mouth/Throat:      Mouth: Mucous membranes are moist.   Eyes:      Extraocular Movements: Extraocular movements intact.      Pupils: Pupils are equal, round, and reactive to light.   Cardiovascular:      Comments: Regular, intermittent systolic murmur  Pulmonary:      Effort: Pulmonary effort is normal.      Breath sounds: Normal breath sounds.   Abdominal:      Comments: She is more distended today. More tender to palpation in bilateral lower quadrants.   No guarding  Hyperactive bowel sounds   Musculoskeletal:      Comments: Good bruit in RUE   Skin:     General: Skin is warm and dry.   Neurological:      Mental Status: She is alert and oriented to person, place, and time.      Motor: Weakness present.   Psychiatric:         Mood and Affect: Mood normal.         Behavior: Behavior normal.          Temp:  [36.3 °C (97.3 °F)-36.8 °C (98.2 °F)] 36.8 °C (98.2 °F)  Heart Rate:  [74-82] 78  Resp:  [14-19] 16  BP: (121-174)/(61-78) 163/78    Vitals:    01/23/25 1624   Weight: 74.6 kg (164 lb 6.4 oz)           I/Os    Intake/Output Summary (Last 24 hours) at 1/26/2025 1100  Last data filed at 1/26/2025 0650  Gross per 24 hour   Intake 290 ml   Output 0 ml   Net 290 ml       Labs:   Results from last 72 hours   Lab Units 01/24/25  0725   SODIUM mmol/L 131*   POTASSIUM mmol/L 3.9   CHLORIDE mmol/L 94*   CO2 mmol/L 28   BUN mg/dL 38*  "  CREATININE mg/dL 5.51*   GLUCOSE mg/dL 88   CALCIUM mg/dL 8.1*   ANION GAP mmol/L 13   EGFR mL/min/1.73m*2 7*           Lab Results   Component Value Date    CALCIUM 8.1 (L) 01/24/2025    PHOS 5.2 (H) 01/23/2025      Lab Results   Component Value Date    CRP 2.77 (H) 01/23/2025      [unfilled]     Micro/ID:   No results found for the last 90 days.    Results from last 7 days   Lab Units 01/23/25  1034   HEPATITIS B S AB mIU/mL 459.9*   HEP B S AG  Nonreactive                No lab exists for component: \"AGALPCRNB\"   .ID  No results found for: \"URINECULTURE\", \"BLOODCULT\", \"CSFCULTSMEAR\"    Images:  XR chest 1 view  Narrative: Interpreted By:  Reanna Osman,   STUDY:  XR CHEST 1 VIEW; ;  1/22/2025 2:04 pm      INDICATION:  Signs/Symptoms:cough.          COMPARISON:  Chest radiograph dated 01/22/2024      ACCESSION NUMBER(S):  [id number]      ORDERING CLINICIAN:  SUSY COBB      FINDINGS:  Cardiac silhouette is mildly enlarged but is similar compared to  prior radiograph. Dense atherosclerotic calcifications of the aortic  knob are noted. There are prominent interstitial markings in  bilateral lungs, slightly more pronounced on today's radiograph  compared to immediate prior radiograph. No consolidative airspace  opacity is noted. There is obliteration of bilateral costophrenic  angles, which could be related to pleural scarring versus pleural  effusions. No large pneumothorax within limits of portable technique.  No acute osseous findings.      Impression: 1. Mild prominence of interstitial markings in bilateral lungs. This  could be related to chronic lung parenchymal changes versus mild  interstitial edema. Recommend correlation with fluid status.  2. Possible trace bilateral pleural effusions/pleural scarring.              MACRO:  None      Signed by: Reanna Osman 1/22/2025 4:11 PM  Dictation workstation:   [id number]  CT angio abdomen pelvis w and or wo IV IV contrast  Narrative: Interpreted " By:  Juliette Yeh,   STUDY:  CT ANGIO ABDOMEN PELVIS W AND/OR WO IV IV CONTRAST;  1/22/2025 5:43 am      INDICATION:  Signs/Symptoms:Left lower quadrant pain, concern for GI bleed      COMPARISON:  None.      ACCESSION NUMBER(S):  [id number]      ORDERING CLINICIAN:  SOTERO WHEELER      TECHNIQUE:  Noncontrast axial CT images were obtained through the abdomen and  pelvis prior to administration of intravenous contrast. Thin-section  axial images of the abdomen and pelvis in the arterial phase and  portal venous phase after the uneventful administration of  intravenous contrast material. Sagittal and coronal reconstructions.  MIP reconstructed images were performed on an independent workstation  and provided for review.      FINDINGS:  NON-CONTRAST IMAGING:      Atherosclerotic calcifications are noted at the abdominal aorta and  its branches. There is cholelithiasis.  No obstructing ureteral calculi are identified.      POST-CONTRAST IMAGING:      Vascular:  No abdominal aortic aneurysm or acute dissection.  The celiac artery, the superior mesenteric artery, the bilateral  renal arteries and the inferior mesenteric artery are patent. The  bilateral common iliac arteries, the bilateral internal iliac  arteries, the bilateral external iliac arteries, the bilateral common  femoral arteries and the visualized bilateral proximal femoral  arteries are patent.          LOWER CHEST: There is mild bibasilar atelectasis. No pleural  effusion. The heart is enlarged. There is trace pericardial effusion.  BONES: Multilevel degenerative changes at the spine. ABDOMINAL WALL:  There is a small fat containing umbilical hernia.      ABDOMEN:      LIVER: Within normal limits.  BILE DUCTS: Normal caliber.  GALLBLADDER: There is cholelithiasis.  PANCREAS: Within normal limits.  SPLEEN: Within normal limits.  ADRENALS: Within normal limits.  KIDNEYS and URETERS: There is bilateral renal cortical atrophy. No  hydronephrosis. There is  a 1.2 cm indeterminate lesion at the right  kidney.      RETROPERITONEUM: No retroperitoneal hemorrhage.      PELVIS:      REPRODUCTIVE ORGANS: No pelvic mass.  BLADDER: The urinary bladder is partially distended with  circumferential wall thickening. There is mild soft tissue stranding  at the urinary bladder.      BOWEL: No bowel obstruction. There is mild fluid distention of small  bowel loops. There is liquid stool at the colon and rectum. There is  colonic diverticulosis with no CT evidence for acute diverticulitis.  No intraluminal hyperdensity to suggest active GI bleeding.  PERITONEUM: No ascites or free air. No pathologically enlarged lymph  nodes are identified.      Impression: 1. No CT angiogram evidence of active GI bleeding. No abdominal  aortic aneurysm or acute dissection.  2. Liquid stool at the colon and rectum and mild fluid distention of  small bowel loops, suggestive of enterocolitis.  3. Colonic diverticulosis.  4. Circumferential wall thickening at the urinary bladder and mild  soft tissue stranding. Please correlate with urinalysis to evaluate  for cystitis .  5. Bilateral renal cortical atrophy. 1.2 cm indeterminate lesion at  the right kidney, neoplasm cannot be excluded. MRI can be obtained  for further evaluation.  6. Cholelithiasis.  7. Cardiomegaly. Trace pericardial effusion.                  MACRO:  Critical Finding:  See findings. Notification was initiated on  1/22/2025 at 6:52 am by  Juliette eYh.  (**-YCF-**) Instructions:      Signed by: Juliette Yeh 1/22/2025 6:57 AM  Dictation workstation:   [id number]       Meds    Scheduled medications  amitriptyline, 10 mg, oral, Nightly  amLODIPine, 5 mg, oral, Daily  atorvastatin, 20 mg, oral, Nightly  calcitriol, 0.25 mcg, oral, Every Mon/Wed/Fri  calcium carbonate, 1,500 mg, oral, TID  carvedilol, 25 mg, oral, BID  cetirizine, 10 mg, oral, Daily  heparin, 2,500 Units, subcutaneous, q8h  hydroxyurea, 500 mg, oral, Every  Mon/Wed/Fri  lactobacillus acidophilus, 1 capsule, oral, Daily  losartan, 100 mg, oral, Daily  oxygen, , inhalation, Continuous - Inhalation  pantoprazole, 40 mg, oral, BID AC      Continuous medications     PRN medications  PRN medications: acetaminophen, alum-mag hydroxide-simeth, benzonatate, diphenoxylate-atropine, guaiFENesin, melatonin, ondansetron, sodium chloride     Assessment and Plan    Joyce Bethea is a 79 y.o. female   Norovirus. Overall symptoms have improved but still fluctuating. Oral intake variable. Stools seem to be starting to form.   PT. Weak, needs to start moving more.   ESRD, dialysis dependent. Will be due for session tomorrow  Essential thrombocytosis. Stable.  Hypertension, stable.   Cholelithiasis, asymptomatic at this time  Renal lesion. Possible outpt mri to evaluate further when better  Chronic respiratory failure, copd, 3 liters. Stable at this time  Need to monitor still , not quite ready for dc yet      Camille Zepeda MD

## 2025-01-26 NOTE — PROGRESS NOTES
Joyce Bethea is a 79 y.o. female on day 4 of admission presenting with Abdominal pain, unspecified abdominal location.    Subjective   Interval History: no fever, no new complaints, 2 BM       Review of Systems    Objective   Range of Vitals (last 24 hours)  Heart Rate:  [74-82]   Temp:  [36.3 °C (97.3 °F)-36.8 °C (98.2 °F)]   Resp:  [14-19]   BP: (121-174)/(61-78)   SpO2:  [95 %-99 %]   Daily Weight  01/23/25 : 74.6 kg (164 lb 6.4 oz)    Body mass index is 32.11 kg/m².    Physical Exam  Constitutional:       Appearance: Normal appearance.   HENT:      Head: Normocephalic and atraumatic.      Mouth/Throat:      Mouth: Mucous membranes are moist.      Pharynx: Oropharynx is clear.   Eyes:      Pupils: Pupils are equal, round, and reactive to light.   Cardiovascular:      Rate and Rhythm: Normal rate and regular rhythm.      Heart sounds: Normal heart sounds.   Pulmonary:      Effort: Pulmonary effort is normal.      Breath sounds: Normal breath sounds.   Abdominal:      General: Abdomen is flat. Bowel sounds are normal.      Palpations: Abdomen is soft.   Musculoskeletal:      Cervical back: Normal range of motion.   Neurological:      Mental Status: She is alert.         Antibiotics  This patient does not have an active medication from one of the medication groupers.    Relevant Results  Labs        Results from last 72 hours   Lab Units 01/24/25  0725   SODIUM mmol/L 131*   POTASSIUM mmol/L 3.9   CHLORIDE mmol/L 94*   CO2 mmol/L 28   BUN mg/dL 38*   CREATININE mg/dL 5.51*   GLUCOSE mg/dL 88   CALCIUM mg/dL 8.1*   ANION GAP mmol/L 13   EGFR mL/min/1.73m*2 7*     Results from last 72 hours   Lab Units 01/24/25  0725   ALK PHOS U/L 161*   BILIRUBIN TOTAL mg/dL 0.3   PROTEIN TOTAL g/dL 5.6*   ALT U/L 19   AST U/L 17   ALBUMIN g/dL 3.0*     Estimated Creatinine Clearance: 7.2 mL/min (A) (by C-G formula based on SCr of 5.51 mg/dL (H)).  C-Reactive Protein   Date Value Ref Range Status   01/23/2025 2.77 (H) <1.00  mg/dL Final     CRP   Date Value Ref Range Status   10/12/2021 1.67 (A) mg/dL Final     Comment:     REF VALUE  < 1.00       Microbiology  Reviewed  Imaging  Reviewed        Assessment/Plan   Gastroenteritis secondary to norovirus, early signs of improvement  Positive C. Diff PCR with negative EIA toxins, likely colonization  Respiratory failure / COPD / HEATH / atelectasis      Recommendations :  Supportive care  Increase the activity  Discussed with the medical team     I spent minutes in the professional and overall care of this patient.      Rachael Garnica MD

## 2025-01-27 ENCOUNTER — APPOINTMENT (OUTPATIENT)
Dept: DIALYSIS | Facility: HOSPITAL | Age: 80
End: 2025-01-27
Payer: MEDICARE

## 2025-01-27 LAB
ALBUMIN SERPL BCP-MCNC: 3 G/DL (ref 3.4–5)
ALP SERPL-CCNC: 214 U/L (ref 33–136)
ALT SERPL W P-5'-P-CCNC: 30 U/L (ref 7–45)
ANION GAP SERPL CALC-SCNC: 17 MMOL/L (ref 10–20)
AST SERPL W P-5'-P-CCNC: 26 U/L (ref 9–39)
BASOPHILS # BLD AUTO: 0.04 X10*3/UL (ref 0–0.1)
BASOPHILS NFR BLD AUTO: 0.4 %
BILIRUB SERPL-MCNC: 0.4 MG/DL (ref 0–1.2)
BUN SERPL-MCNC: 72 MG/DL (ref 6–23)
CALCIUM SERPL-MCNC: 8.4 MG/DL (ref 8.6–10.3)
CHLORIDE SERPL-SCNC: 97 MMOL/L (ref 98–107)
CO2 SERPL-SCNC: 24 MMOL/L (ref 21–32)
CREAT SERPL-MCNC: 8.23 MG/DL (ref 0.5–1.05)
CRP SERPL-MCNC: 0.76 MG/DL
EGFRCR SERPLBLD CKD-EPI 2021: 5 ML/MIN/1.73M*2
EOSINOPHIL # BLD AUTO: 0.45 X10*3/UL (ref 0–0.4)
EOSINOPHIL NFR BLD AUTO: 4.8 %
ERYTHROCYTE [DISTWIDTH] IN BLOOD BY AUTOMATED COUNT: 13.7 % (ref 11.5–14.5)
GLUCOSE SERPL-MCNC: 91 MG/DL (ref 74–99)
HCT VFR BLD AUTO: 28.8 % (ref 36–46)
HGB BLD-MCNC: 9.5 G/DL (ref 12–16)
IMM GRANULOCYTES # BLD AUTO: 0.07 X10*3/UL (ref 0–0.5)
IMM GRANULOCYTES NFR BLD AUTO: 0.7 % (ref 0–0.9)
LYMPHOCYTES # BLD AUTO: 1.61 X10*3/UL (ref 0.8–3)
LYMPHOCYTES NFR BLD AUTO: 17 %
MCH RBC QN AUTO: 33.6 PG (ref 26–34)
MCHC RBC AUTO-ENTMCNC: 33 G/DL (ref 32–36)
MCV RBC AUTO: 102 FL (ref 80–100)
MONOCYTES # BLD AUTO: 0.82 X10*3/UL (ref 0.05–0.8)
MONOCYTES NFR BLD AUTO: 8.7 %
NEUTROPHILS # BLD AUTO: 6.46 X10*3/UL (ref 1.6–5.5)
NEUTROPHILS NFR BLD AUTO: 68.4 %
NRBC BLD-RTO: 0 /100 WBCS (ref 0–0)
PLATELET # BLD AUTO: 301 X10*3/UL (ref 150–450)
POTASSIUM SERPL-SCNC: 4.7 MMOL/L (ref 3.5–5.3)
PROT SERPL-MCNC: 5.5 G/DL (ref 6.4–8.2)
RBC # BLD AUTO: 2.83 X10*6/UL (ref 4–5.2)
SODIUM SERPL-SCNC: 133 MMOL/L (ref 136–145)
WBC # BLD AUTO: 9.5 X10*3/UL (ref 4.4–11.3)

## 2025-01-27 PROCEDURE — 36415 COLL VENOUS BLD VENIPUNCTURE: CPT | Performed by: INTERNAL MEDICINE

## 2025-01-27 PROCEDURE — 86140 C-REACTIVE PROTEIN: CPT | Performed by: INTERNAL MEDICINE

## 2025-01-27 PROCEDURE — 8010000001 HC DIALYSIS - HEMODIALYSIS PER DAY

## 2025-01-27 PROCEDURE — 99232 SBSQ HOSP IP/OBS MODERATE 35: CPT | Performed by: INTERNAL MEDICINE

## 2025-01-27 PROCEDURE — 2500000004 HC RX 250 GENERAL PHARMACY W/ HCPCS (ALT 636 FOR OP/ED): Performed by: INTERNAL MEDICINE

## 2025-01-27 PROCEDURE — 84075 ASSAY ALKALINE PHOSPHATASE: CPT | Performed by: INTERNAL MEDICINE

## 2025-01-27 PROCEDURE — 85025 COMPLETE CBC W/AUTO DIFF WBC: CPT | Performed by: INTERNAL MEDICINE

## 2025-01-27 PROCEDURE — 2500000001 HC RX 250 WO HCPCS SELF ADMINISTERED DRUGS (ALT 637 FOR MEDICARE OP): Performed by: INTERNAL MEDICINE

## 2025-01-27 PROCEDURE — 1200000002 HC GENERAL ROOM WITH TELEMETRY DAILY

## 2025-01-27 PROCEDURE — 94760 N-INVAS EAR/PLS OXIMETRY 1: CPT

## 2025-01-27 PROCEDURE — 2500000005 HC RX 250 GENERAL PHARMACY W/O HCPCS: Performed by: INTERNAL MEDICINE

## 2025-01-27 PROCEDURE — 2500000001 HC RX 250 WO HCPCS SELF ADMINISTERED DRUGS (ALT 637 FOR MEDICARE OP): Performed by: NURSE PRACTITIONER

## 2025-01-27 RX ADMIN — CALCITRIOL CAPSULES 0.25 MCG 0.25 MCG: 0.25 CAPSULE ORAL at 14:53

## 2025-01-27 RX ADMIN — PANTOPRAZOLE SODIUM 40 MG: 40 TABLET, DELAYED RELEASE ORAL at 14:53

## 2025-01-27 RX ADMIN — PANTOPRAZOLE SODIUM 40 MG: 40 TABLET, DELAYED RELEASE ORAL at 06:13

## 2025-01-27 RX ADMIN — CALCIUM CARBONATE 1500 MG: 500 TABLET, CHEWABLE ORAL at 17:04

## 2025-01-27 RX ADMIN — CARVEDILOL 25 MG: 25 TABLET, FILM COATED ORAL at 17:05

## 2025-01-27 RX ADMIN — CARVEDILOL 25 MG: 25 TABLET, FILM COATED ORAL at 09:40

## 2025-01-27 RX ADMIN — AMLODIPINE BESYLATE 5 MG: 5 TABLET ORAL at 09:24

## 2025-01-27 RX ADMIN — CALCIUM CARBONATE 1500 MG: 500 TABLET, CHEWABLE ORAL at 12:01

## 2025-01-27 RX ADMIN — HEPARIN SODIUM 2500 UNITS: 5000 INJECTION, SOLUTION INTRAVENOUS; SUBCUTANEOUS at 14:53

## 2025-01-27 RX ADMIN — CALCIUM CARBONATE 1500 MG: 500 TABLET, CHEWABLE ORAL at 09:23

## 2025-01-27 RX ADMIN — ATORVASTATIN CALCIUM 20 MG: 20 TABLET, FILM COATED ORAL at 21:16

## 2025-01-27 RX ADMIN — CETIRIZINE HYDROCHLORIDE 10 MG: 10 TABLET, FILM COATED ORAL at 21:16

## 2025-01-27 RX ADMIN — HEPARIN SODIUM 2500 UNITS: 5000 INJECTION, SOLUTION INTRAVENOUS; SUBCUTANEOUS at 06:14

## 2025-01-27 RX ADMIN — Medication 1 CAPSULE: at 09:24

## 2025-01-27 RX ADMIN — Medication 3 L/MIN: at 09:34

## 2025-01-27 RX ADMIN — HYDROXYUREA 500 MG: 500 CAPSULE ORAL at 14:53

## 2025-01-27 RX ADMIN — LOSARTAN POTASSIUM 100 MG: 50 TABLET, FILM COATED ORAL at 09:24

## 2025-01-27 RX ADMIN — AMITRIPTYLINE HYDROCHLORIDE 10 MG: 10 TABLET, FILM COATED ORAL at 21:16

## 2025-01-27 RX ADMIN — Medication 3 L/MIN: at 21:38

## 2025-01-27 RX ADMIN — Medication 5 MG: at 21:26

## 2025-01-27 RX ADMIN — HEPARIN SODIUM 2500 UNITS: 5000 INJECTION, SOLUTION INTRAVENOUS; SUBCUTANEOUS at 21:38

## 2025-01-27 ASSESSMENT — COGNITIVE AND FUNCTIONAL STATUS - GENERAL
STANDING UP FROM CHAIR USING ARMS: A LITTLE
CLIMB 3 TO 5 STEPS WITH RAILING: A LOT
MOVING TO AND FROM BED TO CHAIR: A LITTLE
DRESSING REGULAR LOWER BODY CLOTHING: A LITTLE
TOILETING: A LITTLE
CLIMB 3 TO 5 STEPS WITH RAILING: A LOT
DRESSING REGULAR UPPER BODY CLOTHING: A LITTLE
DRESSING REGULAR LOWER BODY CLOTHING: A LITTLE
HELP NEEDED FOR BATHING: A LITTLE
DAILY ACTIVITIY SCORE: 20
DAILY ACTIVITIY SCORE: 20
MOBILITY SCORE: 19
HELP NEEDED FOR BATHING: A LITTLE
MOVING TO AND FROM BED TO CHAIR: A LITTLE
DRESSING REGULAR UPPER BODY CLOTHING: A LITTLE
WALKING IN HOSPITAL ROOM: A LITTLE
MOBILITY SCORE: 19
TOILETING: A LITTLE
WALKING IN HOSPITAL ROOM: A LITTLE
STANDING UP FROM CHAIR USING ARMS: A LITTLE

## 2025-01-27 ASSESSMENT — PAIN SCALES - GENERAL
PAINLEVEL_OUTOF10: 0 - NO PAIN
PAINLEVEL_OUTOF10: 0 - NO PAIN

## 2025-01-27 ASSESSMENT — PAIN - FUNCTIONAL ASSESSMENT
PAIN_FUNCTIONAL_ASSESSMENT: 0-10
PAIN_FUNCTIONAL_ASSESSMENT: 0-10

## 2025-01-27 NOTE — PROGRESS NOTES
Occupational Therapy                 Therapy Communication Note    Patient Name: Joyce Bethea  MRN: [id number]  Department: [facility]  Room: [address] [place], [place] [address]  Today's Date: 1/27/2025     Discipline: Occupational Therapy          Missed Visit Reason:  1305: Pt receiving hemodialysis, no OT eval completed at this time. Staff aware.     Missed Time: Attempt

## 2025-01-27 NOTE — PRE-PROCEDURE NOTE
Report from Sending RN:    Report From: EHSAN   Recent Surgery of Procedure: No  Baseline Level of Consciousness (LOC): A&OX4  Oxygen Use: Yes  Type: 3L  Diabetic: No  Last BP Med Given Day of Dialysis: SEE EMR  Last Pain Med Given: SEE EMR  Lab Tests to be Obtained with Dialysis: No  Blood Transfusion to be Given During Dialysis: No  Available IV Access: Yes  Medications to be Administered During Dialysis: No  Continuous IV Infusion Running: No  Restraints on Currently or in the Last 24 Hours: No  Hand-Off Communication: PATIENT IS STABLE   Dialysis Catheter Dressing: N/A  Last Dressing Change: N/A

## 2025-01-27 NOTE — POST-PROCEDURE NOTE
Report to Receiving RN:    Report To: luis e  Time Report Called: [id number]  Hand-Off Communication: VIA epic chat   Complications During Treatment: Yes  Ultrafiltration Treatment: No  Medications Administered During Dialysis: No  Blood Products Administered During Dialysis: No  Labs Sent During Dialysis: No  Heparin Drip Rate Changes: No  Dialysis Catheter Dressing: n/a  Last Dressing Change: n/a    Electronic Signatures:  (Signed florence acevedo)   Last Updated: 3:09 PM by FLORENCE ACEVEDO

## 2025-01-27 NOTE — PROGRESS NOTES
Joyce Bethea is a 79 y.o. female on day 5 of admission presenting with Abdominal pain, unspecified abdominal location.      Subjective   We are following this patient for ESRD on hemodialysis.  She is here for nausea and vomiting related to norovirus infection.  Her last hemodialysis session was on Friday and was uneventful.  Did not have any acute issues over the weekend.  Her regular dialysis schedule is on a Monday Wednesday Friday schedule.  She is under the care of Dr. Camara. Stool today less formed but not watery. Eayting okay. Orders reviewed and confirmed HD orders       Objective   Awake and alert. No edema. Non toxic       Vitals 24HR  Heart Rate:  [75-77]   Temp:  [36.2 °C (97.2 °F)-36.6 °C (97.9 °F)]   Resp:  [16]   BP: (155-161)/(60-70)   SpO2:  [97 %-100 %]       Intake/Output last 3 Shifts:    Intake/Output Summary (Last 24 hours) at 1/27/2025 1030  Last data filed at 1/26/2025 2029  Gross per 24 hour   Intake 120 ml   Output --   Net 120 ml       Physical Exam    Relevant Results               Assessment/Plan              Assessment & Plan  Abdominal pain, unspecified abdominal location    Abdominal pain    Nausea and vomiting    Enterocolitis    ESRD on HD d/t HTN  Norovirus infections     - HD today as ordered  - Cont thrice weekly HD on a MWF schedule  - Okay for discharge when other med issues resolved as her ESRD is at baseline           Jewell Kasper MD

## 2025-01-27 NOTE — PROGRESS NOTES
Joyce Bethea is a 79 y.o. female on day 5 of admission presenting with Abdominal pain, unspecified abdominal location.    Subjective   Interval History: no fever, no new complaints       Review of Systems    Objective   Range of Vitals (last 24 hours)  Heart Rate:  [75-78]   Temp:  [36.2 °C (97.2 °F)-36.8 °C (98.2 °F)]   Resp:  [16]   BP: (155-163)/(60-78)   SpO2:  [97 %-100 %]   Daily Weight  01/23/25 : 74.6 kg (164 lb 6.4 oz)    Body mass index is 32.11 kg/m².    Physical Exam  Constitutional:       Appearance: Normal appearance.   HENT:      Head: Normocephalic and atraumatic.      Mouth/Throat:      Mouth: Mucous membranes are moist.      Pharynx: Oropharynx is clear.   Eyes:      Pupils: Pupils are equal, round, and reactive to light.   Cardiovascular:      Rate and Rhythm: Normal rate and regular rhythm.      Heart sounds: Normal heart sounds.   Pulmonary:      Effort: Pulmonary effort is normal.      Breath sounds: Normal breath sounds.   Abdominal:      General: Abdomen is flat. Bowel sounds are normal.      Palpations: Abdomen is soft.   Musculoskeletal:      Cervical back: Normal range of motion.   Neurological:      Mental Status: She is alert.         Antibiotics  This patient does not have an active medication from one of the medication groupers.    Relevant Results  Labs  Results from last 72 hours   Lab Units 01/27/25  0711   WBC AUTO x10*3/uL 9.5   HEMOGLOBIN g/dL 9.5*   HEMATOCRIT % 28.8*   PLATELETS AUTO x10*3/uL 301   NEUTROS PCT AUTO % 68.4   LYMPHS PCT AUTO % 17.0   MONOS PCT AUTO % 8.7   EOS PCT AUTO % 4.8       Results from last 72 hours   Lab Units 01/27/25  0711   SODIUM mmol/L 133*   POTASSIUM mmol/L 4.7   CHLORIDE mmol/L 97*   CO2 mmol/L 24   BUN mg/dL 72*   CREATININE mg/dL 8.23*   GLUCOSE mg/dL 91   CALCIUM mg/dL 8.4*   ANION GAP mmol/L 17   EGFR mL/min/1.73m*2 5*     Results from last 72 hours   Lab Units 01/27/25  0711   ALK PHOS U/L 214*   BILIRUBIN TOTAL mg/dL 0.4   PROTEIN  TOTAL g/dL 5.5*   ALT U/L 30   AST U/L 26   ALBUMIN g/dL 3.0*     Estimated Creatinine Clearance: 4.8 mL/min (A) (by C-G formula based on SCr of 8.23 mg/dL (H)).  C-Reactive Protein   Date Value Ref Range Status   01/27/2025 0.76 <1.00 mg/dL Final   01/23/2025 2.77 (H) <1.00 mg/dL Final     CRP   Date Value Ref Range Status   10/12/2021 1.67 (A) mg/dL Final     Comment:     REF VALUE  < 1.00       Microbiology  Reviewed  Imaging  Reviewed        Assessment/Plan   Gastroenteritis secondary to norovirus, early signs of improvement  Positive C. Diff PCR with negative EIA toxins, likely colonization  Respiratory failure / COPD / HEATH / atelectasis      Recommendations :  Supportive care  Discussed with the medical team     I spent minutes in the professional and overall care of this patient.      Rachael Garnica MD

## 2025-01-27 NOTE — PROGRESS NOTES
Physical Therapy                 Therapy Communication Note    Patient Name: Joyce Bethea  MRN: [id number]  Department: [address]  Room: [address] [place], [place] [address]  Today's Date: 1/27/2025     Discipline: Physical Therapy    PT Missed Visit: Yes     Missed Visit Reason: Patient in a medical procedure. Pt receiving hemodialysis. No PT evaluation completed.     Missed Time: Attempt    Comment: 0080

## 2025-01-27 NOTE — PROGRESS NOTES
01/27/25 0941   Discharge Planning   Living Arrangements Other (Comment)  (from [facility])   Support Systems Jainism/jami community;Friends/neighbors   Assistance Needed A&OX4; independent/assist at [employer] for AD[place]s with cane and walker; doesn't drive; O2 baseline 2.5 @ rest 3L exertion([employer])-currently 2.5L NC; PCP Rosanna Barger CNP; dialysis MWF 9am at [facility]   Type of Residence Skilled nursing facility   Do you have animals or pets at home? No   Who is requesting discharge planning? Provider   Home or Post Acute Services Post acute facilities (Rehab/SNF/etc)   Expected Discharge Disposition SNF  (Patient from [facility] skilled side. Can return when ready no precert)   Does the patient need discharge transport arranged? Yes   RoundTrip coordination needed? Yes   Has discharge transport been arranged? No

## 2025-01-27 NOTE — PROGRESS NOTES
Patient: Joyce Bethea  Room/bed: 103/103-A  Admitted on: 1/22/2025    Age: 79 y.o.   Gender: female  Code Status:  DNR and No Intubation   Admitting Dx: Enterocolitis [K52.9]  Abdominal pain, unspecified abdominal location [R10.9]  Nausea and vomiting, unspecified vomiting type [R11.2]    MRN: [id number]  PCP: Brenna Ly, APRN-OMID       Subjective   Still quite weak. Multiple bowel movements, not quite as loose. Appetite still poor    Objective    Physical Exam  Constitutional:       Comments: Alert, fatigued elderly woman, nad   HENT:      Head: Normocephalic.      Nose: Nose normal.      Mouth/Throat:      Mouth: Mucous membranes are moist.   Eyes:      Extraocular Movements: Extraocular movements intact.      Pupils: Pupils are equal, round, and reactive to light.   Cardiovascular:      Pulses: Normal pulses.      Comments: Regular, 2/6 low pitched systolic murmur  Pulmonary:      Effort: Pulmonary effort is normal.      Breath sounds: Normal breath sounds.   Abdominal:      Comments: Softly distended. Bowel sounds present  Mild discomfort to palpation, a little better today   Musculoskeletal:      Comments: Trace edema, lower extremities today  Good bruit in RUE  No distal hair growth   Skin:     Comments: Poor color, turgor   Neurological:      General: No focal deficit present.      Mental Status: She is oriented to person, place, and time.   Psychiatric:         Mood and Affect: Mood normal.         Behavior: Behavior normal.        Temp:  [36.2 °C (97.2 °F)-36.6 °C (97.9 °F)] 36.6 °C (97.9 °F)  Heart Rate:  [75-77] 75  Resp:  [16] 16  BP: (155-161)/(60-70) 155/60    Vitals:    01/23/25 1624   Weight: 74.6 kg (164 lb 6.4 oz)           I/Os    Intake/Output Summary (Last 24 hours) at 1/27/2025 1032  Last data filed at 1/26/2025 2029  Gross per 24 hour   Intake 120 ml   Output --   Net 120 ml       Labs:   Results from last 72 hours   Lab Units 01/27/25  0711   SODIUM mmol/L 133*   POTASSIUM mmol/L  "4.7   CHLORIDE mmol/L 97*   CO2 mmol/L 24   BUN mg/dL 72*   CREATININE mg/dL 8.23*   GLUCOSE mg/dL 91   CALCIUM mg/dL 8.4*   ANION GAP mmol/L 17   EGFR mL/min/1.73m*2 5*      Results from last 72 hours   Lab Units 01/27/25  0711   WBC AUTO x10*3/uL 9.5   HEMOGLOBIN g/dL 9.5*   HEMATOCRIT % 28.8*   PLATELETS AUTO x10*3/uL 301   NEUTROS PCT AUTO % 68.4   LYMPHS PCT AUTO % 17.0   MONOS PCT AUTO % 8.7   EOS PCT AUTO % 4.8      Lab Results   Component Value Date    CALCIUM 8.4 (L) 01/27/2025    PHOS 5.2 (H) 01/23/2025      Lab Results   Component Value Date    CRP 0.76 01/27/2025      [unfilled]     Micro/ID:   No results found for the last 90 days.    Results from last 7 days   Lab Units 01/23/25  1034   HEPATITIS B S AB mIU/mL 459.9*   HEP B S AG  Nonreactive                No lab exists for component: \"AGALPCRNB\"   .ID  No results found for: \"URINECULTURE\", \"BLOODCULT\", \"CSFCULTSMEAR\"    Images:  XR chest 1 view  Narrative: Interpreted By:  Reanna Osman,   STUDY:  XR CHEST 1 VIEW; ;  1/22/2025 2:04 pm      INDICATION:  Signs/Symptoms:cough.          COMPARISON:  Chest radiograph dated 01/22/2024      ACCESSION NUMBER(S):  [id number]      ORDERING CLINICIAN:  SUSY COBB      FINDINGS:  Cardiac silhouette is mildly enlarged but is similar compared to  prior radiograph. Dense atherosclerotic calcifications of the aortic  knob are noted. There are prominent interstitial markings in  bilateral lungs, slightly more pronounced on today's radiograph  compared to immediate prior radiograph. No consolidative airspace  opacity is noted. There is obliteration of bilateral costophrenic  angles, which could be related to pleural scarring versus pleural  effusions. No large pneumothorax within limits of portable technique.  No acute osseous findings.      Impression: 1. Mild prominence of interstitial markings in bilateral lungs. This  could be related to chronic lung parenchymal changes versus mild  interstitial " edema. Recommend correlation with fluid status.  2. Possible trace bilateral pleural effusions/pleural scarring.              MACRO:  None      Signed by: Reanna Osman 1/22/2025 4:11 PM  Dictation workstation:   [id number]  CT angio abdomen pelvis w and or wo IV IV contrast  Narrative: Interpreted By:  Juliette Yeh,   STUDY:  CT ANGIO ABDOMEN PELVIS W AND/OR WO IV IV CONTRAST;  1/22/2025 5:43 am      INDICATION:  Signs/Symptoms:Left lower quadrant pain, concern for GI bleed      COMPARISON:  None.      ACCESSION NUMBER(S):  [id number]      ORDERING CLINICIAN:  SOTERO WHEELER      TECHNIQUE:  Noncontrast axial CT images were obtained through the abdomen and  pelvis prior to administration of intravenous contrast. Thin-section  axial images of the abdomen and pelvis in the arterial phase and  portal venous phase after the uneventful administration of  intravenous contrast material. Sagittal and coronal reconstructions.  MIP reconstructed images were performed on an independent workstation  and provided for review.      FINDINGS:  NON-CONTRAST IMAGING:      Atherosclerotic calcifications are noted at the abdominal aorta and  its branches. There is cholelithiasis.  No obstructing ureteral calculi are identified.      POST-CONTRAST IMAGING:      Vascular:  No abdominal aortic aneurysm or acute dissection.  The celiac artery, the superior mesenteric artery, the bilateral  renal arteries and the inferior mesenteric artery are patent. The  bilateral common iliac arteries, the bilateral internal iliac  arteries, the bilateral external iliac arteries, the bilateral common  femoral arteries and the visualized bilateral proximal femoral  arteries are patent.          LOWER CHEST: There is mild bibasilar atelectasis. No pleural  effusion. The heart is enlarged. There is trace pericardial effusion.  BONES: Multilevel degenerative changes at the spine. ABDOMINAL WALL:  There is a small fat containing umbilical hernia.       ABDOMEN:      LIVER: Within normal limits.  BILE DUCTS: Normal caliber.  GALLBLADDER: There is cholelithiasis.  PANCREAS: Within normal limits.  SPLEEN: Within normal limits.  ADRENALS: Within normal limits.  KIDNEYS and URETERS: There is bilateral renal cortical atrophy. No  hydronephrosis. There is a 1.2 cm indeterminate lesion at the right  kidney.      RETROPERITONEUM: No retroperitoneal hemorrhage.      PELVIS:      REPRODUCTIVE ORGANS: No pelvic mass.  BLADDER: The urinary bladder is partially distended with  circumferential wall thickening. There is mild soft tissue stranding  at the urinary bladder.      BOWEL: No bowel obstruction. There is mild fluid distention of small  bowel loops. There is liquid stool at the colon and rectum. There is  colonic diverticulosis with no CT evidence for acute diverticulitis.  No intraluminal hyperdensity to suggest active GI bleeding.  PERITONEUM: No ascites or free air. No pathologically enlarged lymph  nodes are identified.      Impression: 1. No CT angiogram evidence of active GI bleeding. No abdominal  aortic aneurysm or acute dissection.  2. Liquid stool at the colon and rectum and mild fluid distention of  small bowel loops, suggestive of enterocolitis.  3. Colonic diverticulosis.  4. Circumferential wall thickening at the urinary bladder and mild  soft tissue stranding. Please correlate with urinalysis to evaluate  for cystitis .  5. Bilateral renal cortical atrophy. 1.2 cm indeterminate lesion at  the right kidney, neoplasm cannot be excluded. MRI can be obtained  for further evaluation.  6. Cholelithiasis.  7. Cardiomegaly. Trace pericardial effusion.                  MACRO:  Critical Finding:  See findings. Notification was initiated on  1/22/2025 at 6:52 am by  Juliette Yeh.  (**-YCF-**) Instructions:      Signed by: Juliette Yeh 1/22/2025 6:57 AM  Dictation workstation:   [id number]       Meds    Scheduled medications  amitriptyline, 10 mg, oral,  Nightly  amLODIPine, 5 mg, oral, Daily  atorvastatin, 20 mg, oral, Nightly  calcitriol, 0.25 mcg, oral, Every Mon/Wed/Fri  calcium carbonate, 1,500 mg, oral, TID  carvedilol, 25 mg, oral, BID  cetirizine, 10 mg, oral, Daily  fluticasone, 2 spray, Each Nostril, Daily  heparin, 2,500 Units, subcutaneous, q8h  hydroxyurea, 500 mg, oral, Every Mon/Wed/Fri  lactobacillus acidophilus, 1 capsule, oral, Daily  losartan, 100 mg, oral, Daily  oxygen, , inhalation, Continuous - Inhalation  pantoprazole, 40 mg, oral, BID AC      Continuous medications     PRN medications  PRN medications: acetaminophen, alum-mag hydroxide-simeth, benzonatate, diphenoxylate-atropine, guaiFENesin, melatonin, ondansetron, sodium chloride     Assessment and Plan    Joyce Bethea is a 79 y.o. female admitted from [place], with acute Norovirus infection. Progress has been slow. She also has a positive c diff, felt by Dr Garnica to be colonization.  Acute Norovirus infection. She is no longer vomiting, appetite slow to improve. Stools are variable. Did order lomotil for her, seems to be helping a little.   ESRD, dialysis dependent. She will have dialysis today, per usual schedule  Chronic respiratory failure, 3 liters. Stable  Hypertension. Fairly stable, monitor  Hx breast cancer, dvt prophylaxis  Essential thrombocytosis, on hydroxyurea. Anemia, secondary to bone marrow process  She is pretty weak, fatigued, will have dialysis today. May be able to dc tomorrow if stable. Symptoms may take quite a bit longer to completely clear        Camille Zepeda MD

## 2025-01-28 VITALS
DIASTOLIC BLOOD PRESSURE: 64 MMHG | HEART RATE: 92 BPM | SYSTOLIC BLOOD PRESSURE: 166 MMHG | TEMPERATURE: 96.8 F | WEIGHT: 164.4 LBS | RESPIRATION RATE: 16 BRPM | HEIGHT: 60 IN | OXYGEN SATURATION: 96 % | BODY MASS INDEX: 32.28 KG/M2

## 2025-01-28 PROCEDURE — 2500000004 HC RX 250 GENERAL PHARMACY W/ HCPCS (ALT 636 FOR OP/ED): Performed by: INTERNAL MEDICINE

## 2025-01-28 PROCEDURE — 99238 HOSP IP/OBS DSCHRG MGMT 30/<: CPT | Performed by: FAMILY MEDICINE

## 2025-01-28 PROCEDURE — 2500000001 HC RX 250 WO HCPCS SELF ADMINISTERED DRUGS (ALT 637 FOR MEDICARE OP): Performed by: INTERNAL MEDICINE

## 2025-01-28 RX ADMIN — LOSARTAN POTASSIUM 100 MG: 50 TABLET, FILM COATED ORAL at 08:36

## 2025-01-28 RX ADMIN — AMLODIPINE BESYLATE 5 MG: 5 TABLET ORAL at 08:36

## 2025-01-28 RX ADMIN — CARVEDILOL 25 MG: 25 TABLET, FILM COATED ORAL at 08:36

## 2025-01-28 RX ADMIN — HEPARIN SODIUM 2500 UNITS: 5000 INJECTION, SOLUTION INTRAVENOUS; SUBCUTANEOUS at 06:16

## 2025-01-28 RX ADMIN — PANTOPRAZOLE SODIUM 40 MG: 40 TABLET, DELAYED RELEASE ORAL at 06:16

## 2025-01-28 RX ADMIN — Medication 1 CAPSULE: at 08:36

## 2025-01-28 RX ADMIN — CALCIUM CARBONATE 1500 MG: 500 TABLET, CHEWABLE ORAL at 08:36

## 2025-01-28 SDOH — SOCIAL STABILITY: SOCIAL NETWORK: COMMUNITY RESOURCES: OTHER (COMMENT)

## 2025-01-28 NOTE — PROGRESS NOTES
Joyce Bethea is a 79 y.o. female on day 6 of admission presenting with Abdominal pain, unspecified abdominal location.    Subjective   Interval History: no fever, no new complaints       Review of Systems    Objective   Range of Vitals (last 24 hours)  Heart Rate:  [65-95]   Temp:  [36 °C (96.8 °F)-37.8 °C (100 °F)]   Resp:  [16-18]   BP: (116-169)/(60-71)   SpO2:  [95 %-100 %]   Daily Weight  01/23/25 : 74.6 kg (164 lb 6.4 oz)    Body mass index is 32.11 kg/m².    Physical Exam  Constitutional:       Appearance: Normal appearance.   HENT:      Head: Normocephalic and atraumatic.      Mouth/Throat:      Mouth: Mucous membranes are moist.      Pharynx: Oropharynx is clear.   Eyes:      Pupils: Pupils are equal, round, and reactive to light.   Cardiovascular:      Rate and Rhythm: Normal rate and regular rhythm.      Heart sounds: Normal heart sounds.   Pulmonary:      Effort: Pulmonary effort is normal.      Breath sounds: Normal breath sounds.   Abdominal:      General: Abdomen is flat. Bowel sounds are normal.      Palpations: Abdomen is soft.   Musculoskeletal:      Cervical back: Normal range of motion.   Neurological:      Mental Status: She is alert.         Antibiotics  This patient does not have an active medication from one of the medication groupers.    Relevant Results  Labs  Results from last 72 hours   Lab Units 01/27/25  0711   WBC AUTO x10*3/uL 9.5   HEMOGLOBIN g/dL 9.5*   HEMATOCRIT % 28.8*   PLATELETS AUTO x10*3/uL 301   NEUTROS PCT AUTO % 68.4   LYMPHS PCT AUTO % 17.0   MONOS PCT AUTO % 8.7   EOS PCT AUTO % 4.8       Results from last 72 hours   Lab Units 01/27/25  0711   SODIUM mmol/L 133*   POTASSIUM mmol/L 4.7   CHLORIDE mmol/L 97*   CO2 mmol/L 24   BUN mg/dL 72*   CREATININE mg/dL 8.23*   GLUCOSE mg/dL 91   CALCIUM mg/dL 8.4*   ANION GAP mmol/L 17   EGFR mL/min/1.73m*2 5*     Results from last 72 hours   Lab Units 01/27/25  0711   ALK PHOS U/L 214*   BILIRUBIN TOTAL mg/dL 0.4   PROTEIN  TOTAL g/dL 5.5*   ALT U/L 30   AST U/L 26   ALBUMIN g/dL 3.0*     Estimated Creatinine Clearance: 4.8 mL/min (A) (by C-G formula based on SCr of 8.23 mg/dL (H)).  C-Reactive Protein   Date Value Ref Range Status   01/27/2025 0.76 <1.00 mg/dL Final   01/23/2025 2.77 (H) <1.00 mg/dL Final     CRP   Date Value Ref Range Status   10/12/2021 1.67 (A) mg/dL Final     Comment:     REF VALUE  < 1.00       Microbiology  Reviewed  Imaging  Reviewed        Assessment/Plan   Gastroenteritis secondary to norovirus, early signs of improvement  Positive C. Diff PCR with negative EIA toxins, likely colonization  Respiratory failure / COPD / HEATH / atelectasis      Recommendations :  Supportive care  Increase the activity  Discussed with the medical team     I spent minutes in the professional and overall care of this patient.      Rachael Garnica MD

## 2025-01-28 NOTE — PROGRESS NOTES
01/28/25 1111   Discharge Planning   Living Arrangements Other (Comment)  (from [facility])   Support Systems [employer]/jami community;Friends/neighbors   Assistance Needed A&OX4; independent/assist at [employer] for ADLs with cane and walker; doesn't drive; O2 baseline 2.5 @ rest 3L exertion([employer])-currently 2.5L NC; PCP Rosanna Barger CNP; dialysis MWF 9am at [facility]   Type of Residence Skilled nursing facility   Do you have animals or pets at home? No   Who is requesting discharge planning? Provider   Home or Post Acute Services Post acute facilities (Rehab/SNF/etc)   Expected Discharge Disposition SNF  (Patient from [facility] skilled side. Can return when ready no precert)   Does the patient need discharge transport arranged? Yes   RoundTrip coordination needed? Yes   Has discharge transport been arranged? Yes   What day is the transport expected? 01/28/25   What time is the transport expected? 1130      to transport for more timely discharge. Nurse provided with report number [phone]

## 2025-01-28 NOTE — CARE PLAN
Problem: Pain - Adult  Goal: Verbalizes/displays adequate comfort level or baseline comfort level  Outcome: Progressing     Problem: Safety - Adult  Goal: Free from fall injury  Outcome: Progressing     Problem: Discharge Planning  Goal: Discharge to home or other facility with appropriate resources  Outcome: Progressing     Problem: Chronic Conditions and Co-morbidities  Goal: Patient's chronic conditions and co-morbidity symptoms are monitored and maintained or improved  Outcome: Progressing     Problem: Nutrition  Goal: Nutrient intake appropriate for maintaining nutritional needs  Outcome: Progressing   The patient's goals for the shift include      The clinical goals for the shift include patient will get up to the chair for all meals today    Patient had lunch and dinner in chair.    
  Problem: Pain - Adult  Goal: Verbalizes/displays adequate comfort level or baseline comfort level  Outcome: Progressing     Problem: Safety - Adult  Goal: Free from fall injury  Outcome: Progressing     Problem: Discharge Planning  Goal: Discharge to home or other facility with appropriate resources  Outcome: Progressing     Problem: Chronic Conditions and Co-morbidities  Goal: Patient's chronic conditions and co-morbidity symptoms are monitored and maintained or improved  Outcome: Progressing     Problem: Nutrition  Goal: Nutrient intake appropriate for maintaining nutritional needs  Outcome: Progressing   The patient's goals for the shift include      The clinical goals for the shift include patient will have two meals in the chair today    Patient did not feel up to getting to chair for meals today, patient reports feeling overly tired today.    
The clinical goals for the shift include Pt will have 2 or fewer BMs        
The clinical goals for the shift include Pt will have decreased diarrhea      
The clinical goals for the shift include Pt will tolerate hemodialysis        
The patient's goals for the shift include      The clinical goals for the shift include pt will continue to have fewer BMs throughout shift    
The patient's goals for the shift include      The clinical goals for the shift include pt will have pain control.         
The patient's goals for the shift include  get some sleep     The clinical goals for the shift include pt will have less BMs      
The patient's goals for the shift include  have less bowel diarrhea    The clinical goals for the shift include patient will rest comfortably throughout shift    
The patient's goals for the shift include  have less diarrhea    The clinical goals for the shift include pt will continue to have fewer BMs throughout shift      
The patient's goals for the shift include  have not a liquid BM    The clinical goals for the shift include walk to bathroom      
The patient's goals for the shift include  rest    The clinical goals for the shift include safety      
The patient's goals for the shift include  rest    The clinical goals for the shift include safety        
The patient's goals for the shift include  use call light to maintain safety throughout shift.    The clinical goals for the shift include diaylsis set up        
Left arm;

## 2025-01-28 NOTE — PROGRESS NOTES
01/28/25 1100   Referral Data   Referral Source Self referral   Referral To   Community Resources Other (Comment)  (SW sent updates to [facility] and advised that pt was d/c'd today.)

## 2025-01-28 NOTE — DISCHARGE SUMMARY
Discharge Diagnosis  Acute Norovirus infection, ESRD, Chronic hypoxic respiratory failure, HTN, Hx breast cancer, Essential thrombocytosis       Discharge Meds     Medication List      CONTINUE taking these medications     acetaminophen 325 mg tablet; Commonly known as: Tylenol   amitriptyline 10 mg tablet; Commonly known as: Elavil   amLODIPine 5 mg tablet; Commonly known as: Norvasc; Take 1 tablet (5 mg)   by mouth once daily.   atorvastatin 20 mg tablet; Commonly known as: Lipitor; Take 1 tablet (20   mg) by mouth once daily at bedtime.   calcitriol 0.25 mcg capsule; Commonly known as: Rocaltrol   carvedilol 25 mg tablet; Commonly known as: Coreg   cholecalciferol 50 mcg (2,000 unit) capsule; Commonly known as: Vitamin   D-3   hydroxyurea 500 mg capsule; Commonly known as: Hydrea; Take 1 capsule   (500 mg total) by mouth once a day on Monday, Wednesday, and Friday.  M,   W, F   loratadine 10 mg tablet; Commonly known as: Claritin   losartan 25 mg tablet; Commonly known as: Cozaar   meclizine 12.5 mg tablet; Commonly known as: Antivert; Take 1 tablet   (12.5 mg) by mouth 3 times a day as needed for dizziness.   Mucinex 600 mg 12 hr tablet; Generic drug: guaiFENesin   nitroglycerin 0.4 mg SL tablet; Commonly known as: Nitrostat   omeprazole 20 mg DR capsule; Commonly known as: PriLOSEC   oxygen gas therapy; Commonly known as: O2   PROBIOTIC FORMULA (INULIN) ORAL   Procrit 2,000 unit/mL injection; Generic drug: epoetin palomo   Senna-S 8.6-50 mg tablet; Generic drug: sennosides-docusate sodium   sodium chloride 0.65 % nasal spray; Commonly known as: Ocean   vitamin B complex-vitamin C-folic acid 1 mg capsule; Commonly known as:   Nephrocaps     STOP taking these medications     sevelamer carbonate 800 mg tablet; Commonly known as: Renvela       Test Results Pending At Discharge  Pending Labs       No current pending labs.            Hospital Course   Joyce Bethea is a 79 y.o. female admitted from [facility]  with acute Norovirus infection. She also has a positive c diff, felt by Dr Garnica to be colonization.  During her hospitalization fatigue and stool frequency improved and she is stable for discharge.  She had hemodialysis on Monday however it was cut short as the access infiltrated.  In discussing the care further with nephrology they felt the patient should continue hemodialysis as scheduled on Wednesday, there is no need to repeat HD on Tuesday.    Pertinent Physical Exam At Time of Discharge  Physical Exam  Gen: Sitting in bedside chair, not in acute distress  HEENT: atraumatic, normocephalic  Pulm: normal respiratory effort, clear to auscultation b/l  Cardiac: RRR, no murmurs noted, normal S1/S2  GI: Soft, nontender, BS+  MSK: normal ROM without joint swelling  Extremities: no LE edema, cyanosis  Neuro: AOX3, CN II-XII grossly intact, equal b/l strength, no loss in sensation   Psych: calm and appropriate for situation   Outpatient Follow-Up  Future Appointments   Date Time Provider Department Center   5/15/2025 10:30 AM ANASTASIA Acevedo-CNP [id number] [facility]   6/19/2025  1:00 PM Azul Zuñiga PA-C [id number] [facility]   10/28/2025  2:20 PM Mercedes Manning MD [id number] [facility]         Ivan Clayton DO

## 2025-02-07 ENCOUNTER — APPOINTMENT (OUTPATIENT)
Dept: CARDIOLOGY | Facility: HOSPITAL | Age: 80
End: 2025-02-07
Payer: MEDICARE

## 2025-02-07 ENCOUNTER — APPOINTMENT (OUTPATIENT)
Dept: RADIOLOGY | Facility: HOSPITAL | Age: 80
End: 2025-02-07
Payer: MEDICARE

## 2025-02-07 ENCOUNTER — LAB REQUISITION (OUTPATIENT)
Dept: LAB | Facility: HOSPITAL | Age: 80
End: 2025-02-07
Payer: MEDICARE

## 2025-02-07 ENCOUNTER — HOSPITAL ENCOUNTER (EMERGENCY)
Facility: HOSPITAL | Age: 80
Discharge: HOME | End: 2025-02-07
Payer: MEDICARE

## 2025-02-07 VITALS
HEART RATE: 101 BPM | RESPIRATION RATE: 18 BRPM | DIASTOLIC BLOOD PRESSURE: 76 MMHG | SYSTOLIC BLOOD PRESSURE: 167 MMHG | WEIGHT: 163 LBS | TEMPERATURE: 97.9 F | HEIGHT: 60 IN | BODY MASS INDEX: 32 KG/M2 | OXYGEN SATURATION: 100 %

## 2025-02-07 DIAGNOSIS — J18.9 PNEUMONIA OF RIGHT UPPER LOBE DUE TO INFECTIOUS ORGANISM: Primary | ICD-10-CM

## 2025-02-07 DIAGNOSIS — R79.1 ABNORMAL COAGULATION PROFILE: ICD-10-CM

## 2025-02-07 LAB
ALBUMIN SERPL BCP-MCNC: 3.9 G/DL (ref 3.4–5)
ALP SERPL-CCNC: 254 U/L (ref 33–136)
ALT SERPL W P-5'-P-CCNC: 22 U/L (ref 7–45)
ANION GAP SERPL CALC-SCNC: 10 MMOL/L (ref 10–20)
AST SERPL W P-5'-P-CCNC: 20 U/L (ref 9–39)
BASOPHILS # BLD AUTO: 0.05 X10*3/UL (ref 0–0.1)
BASOPHILS NFR BLD AUTO: 0.5 %
BILIRUB SERPL-MCNC: 0.4 MG/DL (ref 0–1.2)
BNP SERPL-MCNC: 578 PG/ML (ref 0–99)
BUN SERPL-MCNC: 15 MG/DL (ref 6–23)
CALCIUM SERPL-MCNC: 8.9 MG/DL (ref 8.6–10.3)
CARDIAC TROPONIN I PNL SERPL HS: 26 NG/L (ref 0–13)
CARDIAC TROPONIN I PNL SERPL HS: 28 NG/L (ref 0–13)
CHLORIDE SERPL-SCNC: 98 MMOL/L (ref 98–107)
CO2 SERPL-SCNC: 32 MMOL/L (ref 21–32)
CREAT SERPL-MCNC: 2.54 MG/DL (ref 0.5–1.05)
D DIMER PPP FEU-MCNC: 1207 NG/ML FEU
EGFRCR SERPLBLD CKD-EPI 2021: 19 ML/MIN/1.73M*2
EOSINOPHIL # BLD AUTO: 0.56 X10*3/UL (ref 0–0.4)
EOSINOPHIL NFR BLD AUTO: 6 %
ERYTHROCYTE [DISTWIDTH] IN BLOOD BY AUTOMATED COUNT: 14.9 % (ref 11.5–14.5)
FLUAV RNA RESP QL NAA+PROBE: NOT DETECTED
FLUBV RNA RESP QL NAA+PROBE: NOT DETECTED
GLUCOSE SERPL-MCNC: 147 MG/DL (ref 74–99)
HCT VFR BLD AUTO: 30.7 % (ref 36–46)
HGB BLD-MCNC: 9.7 G/DL (ref 12–16)
IMM GRANULOCYTES # BLD AUTO: 0.05 X10*3/UL (ref 0–0.5)
IMM GRANULOCYTES NFR BLD AUTO: 0.5 % (ref 0–0.9)
LACTATE SERPL-SCNC: 0.9 MMOL/L (ref 0.4–2)
LYMPHOCYTES # BLD AUTO: 1.49 X10*3/UL (ref 0.8–3)
LYMPHOCYTES NFR BLD AUTO: 16 %
MCH RBC QN AUTO: 33.1 PG (ref 26–34)
MCHC RBC AUTO-ENTMCNC: 31.6 G/DL (ref 32–36)
MCV RBC AUTO: 105 FL (ref 80–100)
MONOCYTES # BLD AUTO: 0.91 X10*3/UL (ref 0.05–0.8)
MONOCYTES NFR BLD AUTO: 9.8 %
NEUTROPHILS # BLD AUTO: 6.25 X10*3/UL (ref 1.6–5.5)
NEUTROPHILS NFR BLD AUTO: 67.2 %
NRBC BLD-RTO: 0 /100 WBCS (ref 0–0)
PLATELET # BLD AUTO: 393 X10*3/UL (ref 150–450)
POTASSIUM SERPL-SCNC: 3.4 MMOL/L (ref 3.5–5.3)
PROT SERPL-MCNC: 6.8 G/DL (ref 6.4–8.2)
RBC # BLD AUTO: 2.93 X10*6/UL (ref 4–5.2)
RSV RNA RESP QL NAA+PROBE: NOT DETECTED
SARS-COV-2 RNA RESP QL NAA+PROBE: NOT DETECTED
SODIUM SERPL-SCNC: 137 MMOL/L (ref 136–145)
WBC # BLD AUTO: 9.3 X10*3/UL (ref 4.4–11.3)

## 2025-02-07 PROCEDURE — 83880 ASSAY OF NATRIURETIC PEPTIDE: CPT | Performed by: PHYSICIAN ASSISTANT

## 2025-02-07 PROCEDURE — 80053 COMPREHEN METABOLIC PANEL: CPT | Performed by: PHYSICIAN ASSISTANT

## 2025-02-07 PROCEDURE — 85379 FIBRIN DEGRADATION QUANT: CPT

## 2025-02-07 PROCEDURE — 84484 ASSAY OF TROPONIN QUANT: CPT | Performed by: PHYSICIAN ASSISTANT

## 2025-02-07 PROCEDURE — 2550000001 HC RX 255 CONTRASTS: Performed by: PHYSICIAN ASSISTANT

## 2025-02-07 PROCEDURE — 83605 ASSAY OF LACTIC ACID: CPT | Performed by: PHYSICIAN ASSISTANT

## 2025-02-07 PROCEDURE — 36415 COLL VENOUS BLD VENIPUNCTURE: CPT | Performed by: PHYSICIAN ASSISTANT

## 2025-02-07 PROCEDURE — 85025 COMPLETE CBC W/AUTO DIFF WBC: CPT | Performed by: PHYSICIAN ASSISTANT

## 2025-02-07 PROCEDURE — 2500000001 HC RX 250 WO HCPCS SELF ADMINISTERED DRUGS (ALT 637 FOR MEDICARE OP): Performed by: PHYSICIAN ASSISTANT

## 2025-02-07 PROCEDURE — 85379 FIBRIN DEGRADATION QUANT: CPT | Mod: OUT | Performed by: REGISTERED NURSE

## 2025-02-07 PROCEDURE — 93005 ELECTROCARDIOGRAM TRACING: CPT

## 2025-02-07 PROCEDURE — 71275 CT ANGIOGRAPHY CHEST: CPT | Performed by: RADIOLOGY

## 2025-02-07 PROCEDURE — 2500000002 HC RX 250 W HCPCS SELF ADMINISTERED DRUGS (ALT 637 FOR MEDICARE OP, ALT 636 FOR OP/ED): Performed by: PHYSICIAN ASSISTANT

## 2025-02-07 PROCEDURE — 99285 EMERGENCY DEPT VISIT HI MDM: CPT | Mod: 25

## 2025-02-07 PROCEDURE — 71275 CT ANGIOGRAPHY CHEST: CPT

## 2025-02-07 PROCEDURE — 87634 RSV DNA/RNA AMP PROBE: CPT | Performed by: PHYSICIAN ASSISTANT

## 2025-02-07 RX ORDER — AMOXICILLIN 500 MG/1
500 CAPSULE ORAL EVERY 12 HOURS SCHEDULED
Qty: 20 CAPSULE | Refills: 0 | Status: SHIPPED | OUTPATIENT
Start: 2025-02-07 | End: 2025-02-17

## 2025-02-07 RX ORDER — POTASSIUM CHLORIDE 20 MEQ/1
20 TABLET, EXTENDED RELEASE ORAL ONCE
Status: COMPLETED | OUTPATIENT
Start: 2025-02-07 | End: 2025-02-07

## 2025-02-07 RX ORDER — AMOXICILLIN 250 MG/1
500 CAPSULE ORAL EVERY 12 HOURS SCHEDULED
Status: DISCONTINUED | OUTPATIENT
Start: 2025-02-07 | End: 2025-02-08 | Stop reason: HOSPADM

## 2025-02-07 RX ORDER — DOXYCYCLINE HYCLATE 100 MG
100 TABLET ORAL ONCE
Status: COMPLETED | OUTPATIENT
Start: 2025-02-07 | End: 2025-02-07

## 2025-02-07 RX ORDER — DOXYCYCLINE 100 MG/1
100 CAPSULE ORAL 2 TIMES DAILY
Qty: 20 CAPSULE | Refills: 0 | Status: SHIPPED | OUTPATIENT
Start: 2025-02-07 | End: 2025-02-17

## 2025-02-07 RX ADMIN — IOHEXOL 70 ML: 350 INJECTION, SOLUTION INTRAVENOUS at 20:34

## 2025-02-07 RX ADMIN — DOXYCYCLINE HYCLATE 100 MG: 100 TABLET ORAL at 21:56

## 2025-02-07 RX ADMIN — AMOXICILLIN 500 MG: 250 CAPSULE ORAL at 21:56

## 2025-02-07 RX ADMIN — POTASSIUM CHLORIDE 20 MEQ: 1500 TABLET, EXTENDED RELEASE ORAL at 21:56

## 2025-02-07 ASSESSMENT — PAIN - FUNCTIONAL ASSESSMENT: PAIN_FUNCTIONAL_ASSESSMENT: 0-10

## 2025-02-07 ASSESSMENT — PAIN SCALES - GENERAL: PAINLEVEL_OUTOF10: 0 - NO PAIN

## 2025-02-07 ASSESSMENT — LIFESTYLE VARIABLES
HAVE YOU EVER FELT YOU SHOULD CUT DOWN ON YOUR DRINKING: NO
TOTAL SCORE: 0
EVER HAD A DRINK FIRST THING IN THE MORNING TO STEADY YOUR NERVES TO GET RID OF A HANGOVER: NO
EVER FELT BAD OR GUILTY ABOUT YOUR DRINKING: NO
HAVE PEOPLE ANNOYED YOU BY CRITICIZING YOUR DRINKING: NO

## 2025-02-07 ASSESSMENT — COLUMBIA-SUICIDE SEVERITY RATING SCALE - C-SSRS
6. HAVE YOU EVER DONE ANYTHING, STARTED TO DO ANYTHING, OR PREPARED TO DO ANYTHING TO END YOUR LIFE?: NO
2. HAVE YOU ACTUALLY HAD ANY THOUGHTS OF KILLING YOURSELF?: NO
1. IN THE PAST MONTH, HAVE YOU WISHED YOU WERE DEAD OR WISHED YOU COULD GO TO SLEEP AND NOT WAKE UP?: NO

## 2025-02-07 NOTE — ED TRIAGE NOTES
Patient has dialysis on Mon, Wed, Fri, today after her dialysis she got home and was told she had an elevated D Dimer from lab work drawn this morning. Patient denies CP, does have SOB upon exertion but states that is normal for her. Patient is chronically on 2L to 4L NC and is currently on 3L.

## 2025-02-08 NOTE — ED PROVIDER NOTES
HPI   Chief Complaint   Patient presents with    Abnormal labs       EKG taken on February 7, 2025 1826 shows normal sinus at 74 no STEMI no T wave inversion normal axis QTc of 495 as interpreted by myself              Patient History   Past Medical History:   Diagnosis Date    Body mass index (BMI) 31.0-31.9, adult 04/12/2019    BMI 31.0-31.9,adult    Encounter for follow-up examination after completed treatment for conditions other than malignant neoplasm 11/30/2017    Hospital discharge follow-up    Other conditions influencing health status 03/12/2019    History of cough    Other specified disorders of bone, unspecified site 03/11/2015    Abnormal bone formation    Personal history of malignant neoplasm of breast 02/05/2014    Personal history of breast cancer    Personal history of malignant neoplasm, unspecified     History of malignant neoplasm    Personal history of other diseases of the circulatory system     History of hypertension    Personal history of other diseases of the digestive system     History of gastroesophageal reflux (GERD)    Personal history of other diseases of the musculoskeletal system and connective tissue 01/21/2016    History of trigger finger    Personal history of other diseases of the musculoskeletal system and connective tissue     History of arthritis    Personal history of other diseases of the nervous system and sense organs     History of sleep apnea    Personal history of other diseases of urinary system     History of kidney disease    Respiratory failure, unspecified with hypoxia (Multi) 12/29/2017    Respiratory failure with hypoxia    Shortness of breath 10/06/2017    Exertional shortness of breath    Unspecified cataract     Cataracts, bilateral    Unspecified fracture of unspecified forearm, sequela 10/04/2018    Forearm fracture, sequela     Past Surgical History:   Procedure Laterality Date    COLONOSCOPY  02/05/2014    Colonoscopy (Fiberoptic)    IR CVC TUNNELED   10/12/2021    IR CVC TUNNELED 10/12/2021 [facility]    KNEE ARTHROSCOPY W/ MENISCAL REPAIR  04/01/2014    Knee Arthroscopy With Medial Meniscus Repair    MASTECTOMY  02/05/2014    Breast Surgery Mastectomy    MR HEAD ANGIO WO IV CONTRAST  2/23/2019    MR HEAD ANGIO WO IV CONTRAST 2/23/2019 [facility]    MR NECK ANGIO WO IV CONTRAST  2/23/2019    MR NECK ANGIO WO IV CONTRAST 2/23/2019 [facility]    OTHER SURGICAL HISTORY  09/21/2021    Arteriovenous fistula creation procedure    ROTATOR CUFF REPAIR  04/01/2014    Rotator Cuff Repair    TONSILLECTOMY  02/05/2014    Tonsillectomy     Family History   Problem Relation Name Age of Onset    Hypertension Mother      Leukemia Mother      Osteoarthritis Mother      Colon cancer Father      Diabetes Father      Hypertension Father      Osteoarthritis Sister      Osteoarthritis Other Aunt     Stroke Other Aunt     Stroke Other Uncle     Breast cancer Other Family History      Social History     Tobacco Use    Smoking status: Never    Smokeless tobacco: Never   Vaping Use    Vaping status: Never Used   Substance Use Topics    Alcohol use: Never    Drug use: Never       Physical Exam   ED Triage Vitals [02/07/25 1720]   Temperature Heart Rate Respirations BP   36.6 °C (97.9 °F) 76 18 (!) 181/67      Pulse Ox Temp Source Heart Rate Source Patient Position   100 % Temporal Monitor --      BP Location FiO2 (%)     -- --       Physical Exam      ED Course & MDM   Diagnoses as of 02/08/25 0146   Pneumonia of right upper lobe due to infectious organism                 No data recorded     [place] Coma Scale Score: 15 (02/07/25 1721 : Ernst Borden RN)                           Medical Decision Making      Procedure  Procedures   no murmur.   Pulmonary: Lungs clear bilaterally with good aeration. No adventitious breath sounds.   Abdomen: Soft, nonsurgical. Nontender. No peritoneal signs. Normoactive bowel sounds.  I explained to the patient the test results at this time and spoke with one of her caretakers who is at bedside from the facility that she resides at.  We had a long discussion about admission versus going back to the facility for treatment at this time.  The patient opted to go back to the facility and explained to me that she feels fine at this time and requested to be sent home on just p.o. antibiotics.  The staff member at bedside felt that this is appropriate as well and I explained to them that as long as the patient did not have any worsening decompensation that I felt this was appropriate.  I did speak with nephrology as well as radiology as radiology initially was concerned about the patient's dialysis schedule and felt the patient should have dialysis over the weekend.  Nephrology disagreed with this and explained to me that they felt the patient could wait until Monday to have another course of dialysis as prior scheduled to eliminate the iodine contrast.  I explained this to the caretaker at bedside as well who is in agreement with this plan as was the patient.  After the scan returned the patient was discharged home with antibiotics at this time as well.  Impression:   1.  Pneumonia  2.  Hypokalemia            EKG taken on February 7, 2025 1826 shows normal sinus at 74 no STEMI no T wave inversion normal axis QTc of 495 as interpreted by myself      History provided by:  Patient   used: No            Patient History   Past Medical History:   Diagnosis Date    Body mass index (BMI) 31.0-31.9, adult 04/12/2019    BMI 31.0-31.9,adult    Encounter for follow-up examination after completed treatment for conditions other than malignant neoplasm 11/30/2017    Hospital discharge follow-up    Other  conditions influencing health status 03/12/2019    History of cough    Other specified disorders of bone, unspecified site 03/11/2015    Abnormal bone formation    Personal history of malignant neoplasm of breast 02/05/2014    Personal history of breast cancer    Personal history of malignant neoplasm, unspecified     History of malignant neoplasm    Personal history of other diseases of the circulatory system     History of hypertension    Personal history of other diseases of the digestive system     History of gastroesophageal reflux (GERD)    Personal history of other diseases of the musculoskeletal system and connective tissue 01/21/2016    History of trigger finger    Personal history of other diseases of the musculoskeletal system and connective tissue     History of arthritis    Personal history of other diseases of the nervous system and sense organs     History of sleep apnea    Personal history of other diseases of urinary system     History of kidney disease    Respiratory failure, unspecified with hypoxia (Multi) 12/29/2017    Respiratory failure with hypoxia    Shortness of breath 10/06/2017    Exertional shortness of breath    Unspecified cataract     Cataracts, bilateral    Unspecified fracture of unspecified forearm, sequela 10/04/2018    Forearm fracture, sequela     Past Surgical History:   Procedure Laterality Date    COLONOSCOPY  02/05/2014    Colonoscopy (Fiberoptic)    IR CVC TUNNELED  10/12/2021    IR CVC TUNNELED 10/12/2021 [facility]    KNEE ARTHROSCOPY W/ MENISCAL REPAIR  04/01/2014    Knee Arthroscopy With Medial Meniscus Repair    MASTECTOMY  02/05/2014    Breast Surgery Mastectomy    MR HEAD ANGIO WO IV CONTRAST  2/23/2019    MR HEAD ANGIO WO IV CONTRAST 2/23/2019 [facility]    MR NECK ANGIO WO IV CONTRAST  2/23/2019    MR NECK ANGIO WO IV CONTRAST 2/23/2019 [facility]    OTHER SURGICAL HISTORY  09/21/2021    Arteriovenous fistula creation procedure     ROTATOR CUFF REPAIR  04/01/2014    Rotator Cuff Repair    TONSILLECTOMY  02/05/2014    Tonsillectomy     Family History   Problem Relation Name Age of Onset    Hypertension Mother      Leukemia Mother      Osteoarthritis Mother      Colon cancer Father      Diabetes Father      Hypertension Father      Osteoarthritis Sister      Osteoarthritis Other Aunt     Stroke Other Aunt     Stroke Other Uncle     Breast cancer Other Family History      Social History     Tobacco Use    Smoking status: Never    Smokeless tobacco: Never   Vaping Use    Vaping status: Never Used   Substance Use Topics    Alcohol use: Never    Drug use: Never       Physical Exam   ED Triage Vitals [02/07/25 1720]   Temperature Heart Rate Respirations BP   36.6 °C (97.9 °F) 76 18 (!) 181/67      Pulse Ox Temp Source Heart Rate Source Patient Position   100 % Temporal Monitor --      BP Location FiO2 (%)     -- --       Physical Exam      ED Course & MDM   Diagnoses as of 02/08/25 0146   Pneumonia of right upper lobe due to infectious organism                 No data recorded     [place] Coma Scale Score: 15 (02/07/25 1721 : Ernst Borden RN)                           Medical Decision Making      Procedure  Procedures     Jacky Perez PA-C  02/12/25 120

## 2025-02-08 NOTE — PROGRESS NOTES
Pharmacy Medication History Review    Joyce Bethea is a 79 y.o. female admitted for No Principal Problem: There is no principal problem currently on the Problem List. Please update the Problem List and refresh.. Pharmacy reviewed the patient's [id number] medications and allergies for accuracy.    The list below reflectives the updated PTA list. Please review each medication in order reconciliation for additional clarification and justification.  Prior to Admission Medications   Prescriptions Last Dose Informant Patient Reported? Taking?   B complex-vitamin C-folic acid (Nephrocaps) 1 mg capsule 2/7/2025 Morning Self Yes Yes   Sig: Take 1 capsule by mouth once daily.   Bacillus coagulans/inulin (PROBIOTIC FORMULA, INULIN, ORAL) 2/7/2025 Morning Self Yes Yes   Sig: Take 1 capsule by mouth once daily.   acetaminophen (Tylenol) 325 mg tablet Unknown Self Yes Yes   Sig: Take 2 tablets (650 mg) by mouth every 8 hours if needed for mild pain (1 - 3), headaches or fever (temp greater than 38.0 C). Max 2000mg daily   amLODIPine (Norvasc) 5 mg tablet 2/6/2025 Morning Self Yes Yes   Sig: Take 1 tablet (5 mg) by mouth once daily.   amitriptyline (Elavil) 10 mg tablet 2/6/2025 Evening Self Yes Yes   Sig: Take 1 tablet (10 mg) by mouth once daily.   atorvastatin (Lipitor) 20 mg tablet 2/6/2025 Evening Self Yes Yes   Sig: Take 1 tablet (20 mg) by mouth once daily at bedtime.   calcitriol (Rocaltrol) 0.25 mcg capsule 2/5/2025 Self Yes Yes   Sig: Take 1 capsule (0.25 mcg) by mouth once a day on Monday, Wednesday, and Friday.   carvedilol (Coreg) 25 mg tablet 2/6/2025 Evening Self Yes Yes   Sig: Take 1 tablet (25 mg) by mouth 2 times daily (morning and late afternoon).   cholecalciferol (Vitamin D-3) 50 mcg (2,000 unit) capsule 2/7/2025 Morning Self Yes Yes   Sig: Take 1 capsule (50 mcg) by mouth early in the morning..   epoetin palomo (Procrit) 2,000 unit/mL injection 2/5/2025 Self Yes Yes   Sig: Inject 1 mL (2,000  Units) under the skin once a day on Monday, Wednesday, and Friday.   hydroxyurea (Hydrea) 500 mg capsule 2/7/2025 Morning Self Yes Yes   Sig: Take 1 capsule (500 mg total) by mouth once a day on Monday, Wednesday, and Friday.  M, W, F   loratadine (Claritin) 10 mg tablet 2/6/2025 Evening Self Yes Yes   Sig: Take 1 tablet (10 mg) by mouth once daily as needed for allergies. In the morning   losartan (Cozaar) 100 mg tablet 2/6/2025 Morning Self Yes Yes   Sig: Take 1 tablet (100 mg) by mouth once daily.   meclizine (Antivert) 12.5 mg tablet Unknown Self Yes Yes   Sig: Take 1 tablet (12.5 mg) by mouth 3 times a day as needed for dizziness.   nitroglycerin (Nitrostat) 0.4 mg SL tablet Unknown Self Yes Yes   Sig: Place 1 tablet (0.4 mg) under the tongue every 5 minutes if needed for chest pain.   omeprazole (PriLOSEC) 20 mg DR capsule Unknown Self Yes Yes   Sig: Take 1 capsule (20 mg) by mouth once daily as needed.   oxygen (O2) gas therapy  Self     Sig: Oxygen   Refills: 0       Active   sennosides-docusate sodium (Senna-S) 8.6-50 mg tablet Unknown Self Yes Yes   Sig: Take 1 tablet by mouth once daily at bedtime.   sodium chloride (Ocean) 0.65 % nasal spray Unknown Self Yes Yes   Sig: Administer 2 sprays into each nostril every 1 hour if needed for congestion.      Facility-Administered Medications: None           The list below reflectives the updated allergy list. Please review each documented allergy for additional clarification and justification.  Allergies  Reviewed by Ernst Bodren RN on 2/7/2025        Severity Reactions Comments    Bee Pollen Not Specified Unknown     House Dust Not Specified Unknown     House Dust Mite Not Specified Unknown     Adhesive Tape-silicones Low Rash             Below are additional concerns with the patient's PTA list.      Jaja Mueller

## 2025-02-10 LAB
ATRIAL RATE: 74 BPM
P AXIS: 44 DEGREES
P OFFSET: 184 MS
P ONSET: 133 MS
PR INTERVAL: 168 MS
Q ONSET: 217 MS
QRS COUNT: 13 BEATS
QRS DURATION: 94 MS
QT INTERVAL: 446 MS
QTC CALCULATION(BAZETT): 495 MS
QTC FREDERICIA: 478 MS
R AXIS: -1 DEGREES
T AXIS: 38 DEGREES
T OFFSET: 440 MS
VENTRICULAR RATE: 74 BPM

## 2025-02-25 ENCOUNTER — APPOINTMENT (OUTPATIENT)
Dept: CARDIOLOGY | Facility: HOSPITAL | Age: 80
End: 2025-02-25
Payer: MEDICARE

## 2025-02-25 ENCOUNTER — APPOINTMENT (OUTPATIENT)
Dept: RADIOLOGY | Facility: HOSPITAL | Age: 80
End: 2025-02-25
Payer: MEDICARE

## 2025-02-25 ENCOUNTER — HOSPITAL ENCOUNTER (EMERGENCY)
Facility: HOSPITAL | Age: 80
Discharge: NURSING, REHAB OR LONG-TERM CARE | End: 2025-02-26
Attending: EMERGENCY MEDICINE
Payer: MEDICARE

## 2025-02-25 DIAGNOSIS — U07.1 COVID-19: ICD-10-CM

## 2025-02-25 DIAGNOSIS — I10 ACCELERATED HYPERTENSION: ICD-10-CM

## 2025-02-25 DIAGNOSIS — R11.2 NAUSEA AND VOMITING, UNSPECIFIED VOMITING TYPE: Primary | ICD-10-CM

## 2025-02-25 DIAGNOSIS — R53.1 WEAKNESS: ICD-10-CM

## 2025-02-25 LAB
ALBUMIN SERPL BCP-MCNC: 3.7 G/DL (ref 3.4–5)
ALP SERPL-CCNC: 199 U/L (ref 33–136)
ALT SERPL W P-5'-P-CCNC: 23 U/L (ref 7–45)
ANION GAP SERPL CALC-SCNC: 16 MMOL/L (ref 10–20)
AST SERPL W P-5'-P-CCNC: 26 U/L (ref 9–39)
BASE EXCESS BLDV CALC-SCNC: 0.5 MMOL/L (ref -2–3)
BASOPHILS # BLD AUTO: 0.06 X10*3/UL (ref 0–0.1)
BASOPHILS NFR BLD AUTO: 0.5 %
BILIRUB SERPL-MCNC: 0.4 MG/DL (ref 0–1.2)
BODY TEMPERATURE: ABNORMAL
BUN SERPL-MCNC: 32 MG/DL (ref 6–23)
CALCIUM SERPL-MCNC: 9.2 MG/DL (ref 8.6–10.3)
CARDIAC TROPONIN I PNL SERPL HS: 36 NG/L (ref 0–13)
CARDIAC TROPONIN I PNL SERPL HS: 37 NG/L (ref 0–13)
CHLORIDE SERPL-SCNC: 99 MMOL/L (ref 98–107)
CO2 SERPL-SCNC: 25 MMOL/L (ref 21–32)
CREAT SERPL-MCNC: 4.27 MG/DL (ref 0.5–1.05)
EGFRCR SERPLBLD CKD-EPI 2021: 10 ML/MIN/1.73M*2
EOSINOPHIL # BLD AUTO: 0.38 X10*3/UL (ref 0–0.4)
EOSINOPHIL NFR BLD AUTO: 3.4 %
ERYTHROCYTE [DISTWIDTH] IN BLOOD BY AUTOMATED COUNT: 13.9 % (ref 11.5–14.5)
FLUAV RNA RESP QL NAA+PROBE: NOT DETECTED
FLUBV RNA RESP QL NAA+PROBE: NOT DETECTED
GLUCOSE SERPL-MCNC: 142 MG/DL (ref 74–99)
HCO3 BLDV-SCNC: 25.4 MMOL/L (ref 22–26)
HCT VFR BLD AUTO: 30.1 % (ref 36–46)
HGB BLD-MCNC: 9.8 G/DL (ref 12–16)
IMM GRANULOCYTES # BLD AUTO: 0.16 X10*3/UL (ref 0–0.5)
IMM GRANULOCYTES NFR BLD AUTO: 1.4 % (ref 0–0.9)
INHALED O2 CONCENTRATION: 28 %
LACTATE SERPL-SCNC: 1.5 MMOL/L (ref 0.4–2)
LIPASE SERPL-CCNC: 28 U/L (ref 9–82)
LYMPHOCYTES # BLD AUTO: 1.62 X10*3/UL (ref 0.8–3)
LYMPHOCYTES NFR BLD AUTO: 14.5 %
MAGNESIUM SERPL-MCNC: 1.94 MG/DL (ref 1.6–2.4)
MCH RBC QN AUTO: 33.4 PG (ref 26–34)
MCHC RBC AUTO-ENTMCNC: 32.6 G/DL (ref 32–36)
MCV RBC AUTO: 103 FL (ref 80–100)
MONOCYTES # BLD AUTO: 0.98 X10*3/UL (ref 0.05–0.8)
MONOCYTES NFR BLD AUTO: 8.8 %
NEUTROPHILS # BLD AUTO: 7.96 X10*3/UL (ref 1.6–5.5)
NEUTROPHILS NFR BLD AUTO: 71.4 %
NRBC BLD-RTO: 0 /100 WBCS (ref 0–0)
OXYHGB MFR BLDV: 85.9 % (ref 45–75)
PCO2 BLDV: 41 MM HG (ref 41–51)
PH BLDV: 7.4 PH (ref 7.33–7.43)
PLATELET # BLD AUTO: 366 X10*3/UL (ref 150–450)
PO2 BLDV: 54 MM HG (ref 35–45)
POTASSIUM SERPL-SCNC: 3.7 MMOL/L (ref 3.5–5.3)
PROT SERPL-MCNC: 6.7 G/DL (ref 6.4–8.2)
RBC # BLD AUTO: 2.93 X10*6/UL (ref 4–5.2)
RSV RNA RESP QL NAA+PROBE: NOT DETECTED
SAO2 % BLDV: 88 % (ref 45–75)
SARS-COV-2 RNA RESP QL NAA+PROBE: DETECTED
SODIUM SERPL-SCNC: 136 MMOL/L (ref 136–145)
WBC # BLD AUTO: 11.2 X10*3/UL (ref 4.4–11.3)

## 2025-02-25 PROCEDURE — 99222 1ST HOSP IP/OBS MODERATE 55: CPT | Performed by: FAMILY MEDICINE

## 2025-02-25 PROCEDURE — 96374 THER/PROPH/DIAG INJ IV PUSH: CPT

## 2025-02-25 PROCEDURE — 93005 ELECTROCARDIOGRAM TRACING: CPT

## 2025-02-25 PROCEDURE — 36415 COLL VENOUS BLD VENIPUNCTURE: CPT | Performed by: EMERGENCY MEDICINE

## 2025-02-25 PROCEDURE — 99284 EMERGENCY DEPT VISIT MOD MDM: CPT | Mod: 25

## 2025-02-25 PROCEDURE — 83605 ASSAY OF LACTIC ACID: CPT | Performed by: EMERGENCY MEDICINE

## 2025-02-25 PROCEDURE — 74176 CT ABD & PELVIS W/O CONTRAST: CPT | Performed by: RADIOLOGY

## 2025-02-25 PROCEDURE — 82805 BLOOD GASES W/O2 SATURATION: CPT | Performed by: EMERGENCY MEDICINE

## 2025-02-25 PROCEDURE — 2500000001 HC RX 250 WO HCPCS SELF ADMINISTERED DRUGS (ALT 637 FOR MEDICARE OP)

## 2025-02-25 PROCEDURE — 96375 TX/PRO/DX INJ NEW DRUG ADDON: CPT

## 2025-02-25 PROCEDURE — 84484 ASSAY OF TROPONIN QUANT: CPT | Performed by: EMERGENCY MEDICINE

## 2025-02-25 PROCEDURE — 2500000004 HC RX 250 GENERAL PHARMACY W/ HCPCS (ALT 636 FOR OP/ED): Performed by: EMERGENCY MEDICINE

## 2025-02-25 PROCEDURE — 71250 CT THORAX DX C-: CPT | Performed by: RADIOLOGY

## 2025-02-25 PROCEDURE — 70450 CT HEAD/BRAIN W/O DYE: CPT

## 2025-02-25 PROCEDURE — 80053 COMPREHEN METABOLIC PANEL: CPT | Performed by: EMERGENCY MEDICINE

## 2025-02-25 PROCEDURE — 99285 EMERGENCY DEPT VISIT HI MDM: CPT | Mod: 25 | Performed by: EMERGENCY MEDICINE

## 2025-02-25 PROCEDURE — 85025 COMPLETE CBC W/AUTO DIFF WBC: CPT | Performed by: EMERGENCY MEDICINE

## 2025-02-25 PROCEDURE — 83690 ASSAY OF LIPASE: CPT | Performed by: EMERGENCY MEDICINE

## 2025-02-25 PROCEDURE — 74176 CT ABD & PELVIS W/O CONTRAST: CPT

## 2025-02-25 PROCEDURE — 96376 TX/PRO/DX INJ SAME DRUG ADON: CPT

## 2025-02-25 PROCEDURE — 2500000004 HC RX 250 GENERAL PHARMACY W/ HCPCS (ALT 636 FOR OP/ED)

## 2025-02-25 PROCEDURE — 83735 ASSAY OF MAGNESIUM: CPT | Performed by: EMERGENCY MEDICINE

## 2025-02-25 PROCEDURE — 87637 SARSCOV2&INF A&B&RSV AMP PRB: CPT | Performed by: EMERGENCY MEDICINE

## 2025-02-25 PROCEDURE — 70450 CT HEAD/BRAIN W/O DYE: CPT | Performed by: RADIOLOGY

## 2025-02-25 RX ORDER — HYDRALAZINE HYDROCHLORIDE 20 MG/ML
20 INJECTION INTRAMUSCULAR; INTRAVENOUS ONCE
Status: COMPLETED | OUTPATIENT
Start: 2025-02-25 | End: 2025-02-25

## 2025-02-25 RX ORDER — ONDANSETRON HYDROCHLORIDE 2 MG/ML
4 INJECTION, SOLUTION INTRAVENOUS EVERY 6 HOURS PRN
Status: DISCONTINUED | OUTPATIENT
Start: 2025-02-25 | End: 2025-02-26 | Stop reason: HOSPADM

## 2025-02-25 RX ORDER — METOCLOPRAMIDE HYDROCHLORIDE 5 MG/ML
INJECTION INTRAMUSCULAR; INTRAVENOUS
Status: COMPLETED
Start: 2025-02-25 | End: 2025-02-25

## 2025-02-25 RX ORDER — CALCIUM CARBONATE 200(500)MG
3-4 TABLET,CHEWABLE ORAL 3 TIMES DAILY PRN
COMMUNITY

## 2025-02-25 RX ORDER — LOSARTAN POTASSIUM 25 MG/1
100 TABLET ORAL DAILY
Status: DISCONTINUED | OUTPATIENT
Start: 2025-02-25 | End: 2025-02-26 | Stop reason: HOSPADM

## 2025-02-25 RX ORDER — CHOLECALCIFEROL (VITAMIN D3) 25 MCG
2000 TABLET ORAL DAILY
Status: DISCONTINUED | OUTPATIENT
Start: 2025-02-25 | End: 2025-02-26 | Stop reason: HOSPADM

## 2025-02-25 RX ORDER — CETIRIZINE HYDROCHLORIDE 10 MG/1
10 TABLET ORAL DAILY
Status: DISCONTINUED | OUTPATIENT
Start: 2025-02-25 | End: 2025-02-26 | Stop reason: HOSPADM

## 2025-02-25 RX ORDER — ACETAMINOPHEN 325 MG/1
650 TABLET ORAL EVERY 8 HOURS PRN
Status: DISCONTINUED | OUTPATIENT
Start: 2025-02-25 | End: 2025-02-26 | Stop reason: HOSPADM

## 2025-02-25 RX ORDER — CARVEDILOL 12.5 MG/1
25 TABLET ORAL
Status: DISCONTINUED | OUTPATIENT
Start: 2025-02-25 | End: 2025-02-26 | Stop reason: HOSPADM

## 2025-02-25 RX ORDER — METOCLOPRAMIDE HYDROCHLORIDE 5 MG/ML
10 INJECTION INTRAMUSCULAR; INTRAVENOUS ONCE
Status: COMPLETED | OUTPATIENT
Start: 2025-02-25 | End: 2025-02-25

## 2025-02-25 RX ORDER — PANTOPRAZOLE SODIUM 40 MG/1
40 TABLET, DELAYED RELEASE ORAL
Status: DISCONTINUED | OUTPATIENT
Start: 2025-02-26 | End: 2025-02-26 | Stop reason: HOSPADM

## 2025-02-25 RX ORDER — AMLODIPINE BESYLATE 5 MG/1
5 TABLET ORAL DAILY
Status: DISCONTINUED | OUTPATIENT
Start: 2025-02-25 | End: 2025-02-26 | Stop reason: HOSPADM

## 2025-02-25 RX ORDER — AMITRIPTYLINE HYDROCHLORIDE 10 MG/1
10 TABLET, FILM COATED ORAL DAILY
Status: DISCONTINUED | OUTPATIENT
Start: 2025-02-25 | End: 2025-02-26 | Stop reason: HOSPADM

## 2025-02-25 RX ORDER — ATORVASTATIN CALCIUM 40 MG/1
20 TABLET, FILM COATED ORAL NIGHTLY
Status: DISCONTINUED | OUTPATIENT
Start: 2025-02-25 | End: 2025-02-26 | Stop reason: HOSPADM

## 2025-02-25 RX ORDER — HYDROXYUREA 500 MG/1
500 CAPSULE ORAL
Status: DISCONTINUED | OUTPATIENT
Start: 2025-02-26 | End: 2025-02-26 | Stop reason: HOSPADM

## 2025-02-25 RX ORDER — PANTOPRAZOLE SODIUM 40 MG/10ML
40 INJECTION, POWDER, LYOPHILIZED, FOR SOLUTION INTRAVENOUS
Status: DISCONTINUED | OUTPATIENT
Start: 2025-02-26 | End: 2025-02-26 | Stop reason: HOSPADM

## 2025-02-25 RX ORDER — ONDANSETRON HYDROCHLORIDE 2 MG/ML
4 INJECTION, SOLUTION INTRAVENOUS ONCE
Status: COMPLETED | OUTPATIENT
Start: 2025-02-25 | End: 2025-02-25

## 2025-02-25 RX ORDER — HYDRALAZINE HYDROCHLORIDE 10 MG/1
10 TABLET, FILM COATED ORAL DAILY PRN
Status: DISCONTINUED | OUTPATIENT
Start: 2025-02-25 | End: 2025-02-26 | Stop reason: HOSPADM

## 2025-02-25 RX ORDER — SEVELAMER CARBONATE 800 MG/1
800 TABLET, FILM COATED ORAL
COMMUNITY
End: 2025-02-25 | Stop reason: ENTERED-IN-ERROR

## 2025-02-25 RX ORDER — MECLIZINE HYDROCHLORIDE 25 MG/1
12.5 TABLET ORAL 3 TIMES DAILY PRN
Status: DISCONTINUED | OUTPATIENT
Start: 2025-02-25 | End: 2025-02-26 | Stop reason: HOSPADM

## 2025-02-25 RX ORDER — NITROGLYCERIN 0.4 MG/1
0.4 TABLET SUBLINGUAL EVERY 5 MIN PRN
Status: DISCONTINUED | OUTPATIENT
Start: 2025-02-25 | End: 2025-02-26 | Stop reason: HOSPADM

## 2025-02-25 RX ORDER — CALCIUM CARBONATE 200(500)MG
1 TABLET,CHEWABLE ORAL 3 TIMES DAILY PRN
Status: DISCONTINUED | OUTPATIENT
Start: 2025-02-25 | End: 2025-02-26 | Stop reason: HOSPADM

## 2025-02-25 RX ORDER — CALCITRIOL 0.25 UG/1
0.25 CAPSULE ORAL
Status: DISCONTINUED | OUTPATIENT
Start: 2025-02-26 | End: 2025-02-26 | Stop reason: HOSPADM

## 2025-02-25 RX ORDER — GUAIFENESIN 600 MG/1
1200 TABLET, EXTENDED RELEASE ORAL 2 TIMES DAILY PRN
COMMUNITY

## 2025-02-25 RX ORDER — HYDRALAZINE HYDROCHLORIDE 20 MG/ML
10 INJECTION INTRAMUSCULAR; INTRAVENOUS ONCE
Status: COMPLETED | OUTPATIENT
Start: 2025-02-25 | End: 2025-02-25

## 2025-02-25 RX ORDER — AMOXICILLIN 250 MG
1 CAPSULE ORAL NIGHTLY
Status: DISCONTINUED | OUTPATIENT
Start: 2025-02-25 | End: 2025-02-26 | Stop reason: HOSPADM

## 2025-02-25 RX ADMIN — HYDRALAZINE HYDROCHLORIDE 10 MG: 20 INJECTION, SOLUTION INTRAMUSCULAR; INTRAVENOUS at 06:28

## 2025-02-25 RX ADMIN — CARVEDILOL 25 MG: 12.5 TABLET, FILM COATED ORAL at 17:01

## 2025-02-25 RX ADMIN — ATORVASTATIN CALCIUM 20 MG: 40 TABLET, FILM COATED ORAL at 20:55

## 2025-02-25 RX ADMIN — AMLODIPINE BESYLATE 5 MG: 5 TABLET ORAL at 12:29

## 2025-02-25 RX ADMIN — METOCLOPRAMIDE 10 MG: 5 INJECTION, SOLUTION INTRAMUSCULAR; INTRAVENOUS at 06:42

## 2025-02-25 RX ADMIN — HYDRALAZINE HYDROCHLORIDE 10 MG: 10 TABLET, FILM COATED ORAL at 16:19

## 2025-02-25 RX ADMIN — LOSARTAN POTASSIUM 100 MG: 25 TABLET, FILM COATED ORAL at 12:31

## 2025-02-25 RX ADMIN — Medication 1 CAPSULE: at 17:01

## 2025-02-25 RX ADMIN — METOCLOPRAMIDE HYDROCHLORIDE 10 MG: 5 INJECTION INTRAMUSCULAR; INTRAVENOUS at 06:42

## 2025-02-25 RX ADMIN — AMITRIPTYLINE HYDROCHLORIDE 10 MG: 10 TABLET, FILM COATED ORAL at 12:28

## 2025-02-25 RX ADMIN — Medication 2000 UNITS: at 12:34

## 2025-02-25 RX ADMIN — CETIRIZINE HYDROCHLORIDE 10 MG: 10 TABLET, FILM COATED ORAL at 12:33

## 2025-02-25 RX ADMIN — HYDRALAZINE HYDROCHLORIDE 20 MG: 20 INJECTION, SOLUTION INTRAMUSCULAR; INTRAVENOUS at 09:53

## 2025-02-25 RX ADMIN — ONDANSETRON 4 MG: 2 INJECTION, SOLUTION INTRAMUSCULAR; INTRAVENOUS at 06:29

## 2025-02-25 ASSESSMENT — PAIN DESCRIPTION - PROGRESSION: CLINICAL_PROGRESSION: NOT CHANGED

## 2025-02-25 ASSESSMENT — LIFESTYLE VARIABLES
EVER HAD A DRINK FIRST THING IN THE MORNING TO STEADY YOUR NERVES TO GET RID OF A HANGOVER: NO
HAVE YOU EVER FELT YOU SHOULD CUT DOWN ON YOUR DRINKING: NO
EVER FELT BAD OR GUILTY ABOUT YOUR DRINKING: NO
HAVE PEOPLE ANNOYED YOU BY CRITICIZING YOUR DRINKING: NO
TOTAL SCORE: 0

## 2025-02-25 ASSESSMENT — PAIN - FUNCTIONAL ASSESSMENT
PAIN_FUNCTIONAL_ASSESSMENT: 0-10
PAIN_FUNCTIONAL_ASSESSMENT: 0-10

## 2025-02-25 ASSESSMENT — ACTIVITIES OF DAILY LIVING (ADL): LACK_OF_TRANSPORTATION: NO

## 2025-02-25 ASSESSMENT — PAIN SCALES - GENERAL: PAINLEVEL_OUTOF10: 0 - NO PAIN

## 2025-02-25 NOTE — PROGRESS NOTES
02/25/25 1110   Discharge Planning   Living Arrangements Other (Comment)  (From [facility])   Support Systems Islam/jami community;Friends/neighbors   Assistance Needed A&OX4; independent / assist at times for ADLs with cane and walker; doesn't drive; O2 baseline: 2.5L NC at rest 3-4L NC on exertion ([employer]); PCP Rosanna Barger CNP; dialysis MWF 9am at [facility]   Type of Residence Skilled nursing facility  (Pt from [facility] and can return when ready no precert needed)   Do you have animals or pets at home? No   Who is requesting discharge planning? Provider   Expected Discharge Disposition SNF  (Pt from [facility]. Pt has dialysis MWF 9am at [facility]. Pt last had dialysis Mon 2/24)   Does the patient need discharge transport arranged? Yes   RoundTrip coordination needed? Yes   Has discharge transport been arranged? No   Financial Resource Strain   How hard is it for you to pay for the very basics like food, housing, medical care, and heating? Not hard   Housing Stability   In the last 12 months, was there a time when you were not able to pay the mortgage or rent on time? N   In the past 12 months, how many times have you moved where you were living? 0   At any time in the past 12 months, were you homeless or living in a shelter (including now)? N   Transportation Needs   In the past 12 months, has lack of transportation kept you from medical appointments or from getting medications? no   In the past 12 months, has lack of transportation kept you from meetings, work, or from getting things needed for daily living? No   Intensity of Service   Intensity of Service 0-30 min     02/25/2025 1114am  Spoke with patient and representative from [facility] bedside in ED

## 2025-02-25 NOTE — ED PROVIDER NOTES
HPI   Chief Complaint   Patient presents with    Vomiting     Nausea started at 3:00am tested positive for phillip virus 3 weeks ago        Joyce is an 79-year-old woman who has a history of end-stage renal disease on dialysis and recently was treated for norovirus and then developed pneumonia and treated for that as well.  She finished her antibiotics approximately a week ago.  This morning she started throwing up around 3 AM and has been unable to keep anything down since then she said not a lot came out but it happened about 5 times.  She denies any loose stool.  She denies any fever chills cough or cold but says this feels little like her norovirus that she had recently.  She denies any abdominal discomfort or headache but her blood pressure is markedly high.  She has not taken her morning meds.  She was given 4 mg Zofran prehospital by EMS.  Patient has a sister at [place].  History comes from the patient EMS and paperwork sent with her.  She is DO NOT RESUSCITATE [employer] arrest according to the paperwork.              Patient History   Past Medical History:   Diagnosis Date    Body mass index (BMI) 31.0-31.9, adult 04/12/2019    BMI 31.0-31.9,adult    Encounter for follow-up examination after completed treatment for conditions other than malignant neoplasm 11/30/2017    Hospital discharge follow-up    Other conditions influencing health status 03/12/2019    History of cough    Other specified disorders of bone, unspecified site 03/11/2015    Abnormal bone formation    Personal history of malignant neoplasm of breast 02/05/2014    Personal history of breast cancer    Personal history of malignant neoplasm, unspecified     History of malignant neoplasm    Personal history of other diseases of the circulatory system     History of hypertension    Personal history of other diseases of the digestive system     History of gastroesophageal reflux (GERD)    Personal history of other diseases of the  musculoskeletal system and connective tissue 01/21/2016    History of trigger finger    Personal history of other diseases of the musculoskeletal system and connective tissue     History of arthritis    Personal history of other diseases of the nervous system and sense organs     History of sleep apnea    Personal history of other diseases of urinary system     History of kidney disease    Respiratory failure, unspecified with hypoxia (Multi) 12/29/2017    Respiratory failure with hypoxia    Shortness of breath 10/06/2017    Exertional shortness of breath    Unspecified cataract     Cataracts, bilateral    Unspecified fracture of unspecified forearm, sequela 10/04/2018    Forearm fracture, sequela     Past Surgical History:   Procedure Laterality Date    COLONOSCOPY  02/05/2014    Colonoscopy (Fiberoptic)    IR CVC TUNNELED  10/12/2021    IR CVC TUNNELED 10/12/2021 [facility]    KNEE ARTHROSCOPY W/ MENISCAL REPAIR  04/01/2014    Knee Arthroscopy With Medial Meniscus Repair    MASTECTOMY  02/05/2014    Breast Surgery Mastectomy    MR HEAD ANGIO WO IV CONTRAST  2/23/2019    MR HEAD ANGIO WO IV CONTRAST 2/23/2019 [facility]    MR NECK ANGIO WO IV CONTRAST  2/23/2019    MR NECK ANGIO WO IV CONTRAST 2/23/2019 [facility]    OTHER SURGICAL HISTORY  09/21/2021    Arteriovenous fistula creation procedure    ROTATOR CUFF REPAIR  04/01/2014    Rotator Cuff Repair    TONSILLECTOMY  02/05/2014    Tonsillectomy     Family History   Problem Relation Name Age of Onset    Hypertension Mother      Leukemia Mother      Osteoarthritis Mother      Colon cancer Father      Diabetes Father      Hypertension Father      Osteoarthritis Sister      Osteoarthritis Other Aunt     Stroke Other Aunt     Stroke Other Uncle     Breast cancer Other Family History      Social History     Tobacco Use    Smoking status: Never    Smokeless tobacco: Never   Vaping Use    Vaping status: Never Used   Substance Use Topics     Alcohol use: Never    Drug use: Never       Physical Exam   ED Triage Vitals [02/25/25 0617]   Temperature Heart Rate Respirations BP   35.8 °C (96.4 °F) 76 20 (!) 218/94      Pulse Ox Temp src Heart Rate Source Patient Position   100 % -- Monitor Lying      BP Location FiO2 (%)     Left arm --       Physical Exam  Vitals reviewed.   Constitutional:       General: She is awake.      Comments: Patient refers her eyes closed does not feel well.  She is able answer questions and interact and follow commands appropriately.   HENT:      Head: Normocephalic.      Nose: Nose normal.      Mouth/Throat:      Comments: Dry lips tongue and mouth  Cardiovascular:      Rate and Rhythm: Normal rate and regular rhythm.   Pulmonary:      Effort: Pulmonary effort is normal.      Breath sounds: Normal breath sounds.      Comments: Bilateral clear no Rales  Abdominal:      Palpations: Abdomen is soft.      Comments: Nondistended no guarding or rebound normal bowel sounds   Musculoskeletal:      Cervical back: Normal range of motion.      Comments: 1+ lower extremity nonpitting edema bilaterally   Skin:     General: Skin is warm.      Capillary Refill: Capillary refill takes less than 2 seconds.           ED Course & MDM   ED Course as of 02/25/25 1839   Tue Feb 25, 2025   0625 Presents with 5 episodes of vomiting this morning.  Recently got over norovirus and then pneumonia has stopped the antibiotics last week.  Had some formed stool this morning.  She still has nausea even after getting 4 mg Zofran prehospital.  Plan is to workup with CT scans of chest abdomen pelvis and lab work.  Will hold off on IV fluids right now due to her being a dialysis patient.  Blood pressure is markedly high will give her some medications to try to lower.  Has not taken her morning medicines. [RZ]   0632 EKG done at 0 616 interpreted by me shows sinus rhythm at 79 bpm with occasional PVC and a prolonged QT.  QTc 504.  Similar to old EKG October 30, 2024  no STEMI no new ischemia [RZ]   0639 Patient had more vomiting after getting Zofran will be given some Reglan as well awaiting CT imaging. [RZ]   0716 Patient sees Dr. Camara for dialysis. [RZ]   0738 Patient has elevated troponin which could be due to dialysis but could also be due to some heart strain.  Plan is to repeat the troponin and reevaluate.  Will also repeat EKG. [RZ]   0738 Patient's blood pressure does come down with medication.  Will continue to monitor. [RZ]   0853 -Patient's blood pressure came down little bit and she is feeling little better.  Found to have positive COVID test.  Spoke to the patient and the Christian sister with her. [RZ]      ED Course User Index  [RZ] Luis Fernando Veras MD         Diagnoses as of 02/25/25 1839   Nausea and vomiting, unspecified vomiting type   Accelerated hypertension   Weakness   COVID-19                 No data recorded     [place] Coma Scale Score: 15 (02/25/25 1200 : Joceline Sapp RN)                           Medical Decision Making  Patient did not tolerate p.o. liquids and will be hospitalized.  Talk to patient sister with her and the hospitalist.        Procedure  Procedures     Luis Fernando Veras MD  02/25/25 1839

## 2025-02-25 NOTE — Clinical Note
Is the patient on isolation?: Yes Comment: COVID   Do you expect the patient to require telemetry (informational-only for bed management): Yes   Do you expect the patient to require observation or inpt? (informational-only for bed management): Observation   Bed Type: Telemetry [3]   Bed request comments: COVID - also dialysis patient

## 2025-02-25 NOTE — H&P
History Of Present Illness  Joyce Bethea is a 79 y.o. female presenting with nausea and vomiting from [facility].  Patient is complaining of sore throat, nausea, vomiting, cough, shortness of breath, myalgia, runny nose, headache and weakness   Vomiting started at 3 AM this morning, she has been vomiting about 8-10 times today.  She has been dealing with cough for several weeks, cough has been on and off but did not get worse in the past couple of days.   The shortness of breath that she has is stable, did not get worse.   The headache was worse before but currently is 3 out of 10 in severity.   She denied chest pain, diarrhea, abdominal pain, rash, fever, urinary problems, change in vision.     ED course-CBC was positive for RBC 2.93, HB9.8, Hct 30.1, , absolute neutrophil four 7.96 and absolute monocytes 0.98.  CMP was positive for glucose 142, BUN 32, creatinine 4.27, GFR 10, alkaline phosphatase 199.  Troponin were 36 and 37.   Patient tested positive for COVID but not for flu.  EKG did not show ST changes.   She had Zofran ,metoclopramide and hydralazine 10 and 20 mg for high blood pressure.  CT of the head was negative for any acute event.  CT of abdomen showed interstitial lung disease, cholelithiasis without distention of the gallbladder.        Past Medical History  She has a past medical history of Body mass index (BMI) 31.0-31.9, adult (04/12/2019), Encounter for follow-up examination after completed treatment for conditions other than malignant neoplasm (11/30/2017), Other conditions influencing health status (03/12/2019), Other specified disorders of bone, unspecified site (03/11/2015), Personal history of malignant neoplasm of breast (02/05/2014), Personal history of malignant neoplasm, unspecified, Personal history of other diseases of the circulatory system, Personal history of other diseases of the digestive system, Personal history of other diseases of the musculoskeletal system and  connective tissue (01/21/2016), Personal history of other diseases of the musculoskeletal system and connective tissue, Personal history of other diseases of the nervous system and sense organs, Personal history of other diseases of urinary system, Respiratory failure, unspecified with hypoxia (Multi) (12/29/2017), Shortness of breath (10/06/2017), Unspecified cataract, and Unspecified fracture of unspecified forearm, sequela (10/04/2018).    Surgical History  She has a past surgical history that includes Colonoscopy (02/05/2014); Tonsillectomy (02/05/2014); Mastectomy (02/05/2014); Rotator cuff repair (04/01/2014); Knee arthroscopy w/ meniscal repair (04/01/2014); Other surgical history (09/21/2021); MR angio head wo IV contrast (2/23/2019); MR angio neck wo IV contrast (2/23/2019); and IR CVC tunneled (10/12/2021).     Social History  She reports that she has never smoked. She has never used smokeless tobacco. She reports that she does not drink alcohol and does not use drugs.    Family History  Family History   Problem Relation Name Age of Onset    Hypertension Mother      Leukemia Mother      Osteoarthritis Mother      Colon cancer Father      Diabetes Father      Hypertension Father      Osteoarthritis Sister      Osteoarthritis Other Aunt     Stroke Other Aunt     Stroke Other Uncle     Breast cancer Other Family History         Allergies  Bee pollen, House dust, House dust mite, and Adhesive tape-silicones    Review of Systems   .  All pertinent positive symptoms are included in the history of present illness.    All other systems have been reviewed and are negative and noncontributory to this patient's current ailments.  Physical Exam  Constitutional:       General: She is not in acute distress.     Appearance: Normal appearance. She is obese. She is ill-appearing. She is not toxic-appearing or diaphoretic.   HENT:      Head: Normocephalic and atraumatic.   Cardiovascular:      Rate and Rhythm: Tachycardia  present.      Heart sounds: Normal heart sounds. No murmur heard.     No friction rub. No gallop.   Pulmonary:      Effort: No respiratory distress.      Breath sounds: No stridor. Rales present. No wheezing or rhonchi.      Comments: Positive for rales on the lower lung base bilateral  Chest:      Chest wall: No tenderness.   Abdominal:      General: Abdomen is flat. Bowel sounds are normal. There is no distension.      Palpations: Abdomen is soft. There is no mass.      Tenderness: There is abdominal tenderness. There is no guarding or rebound.      Hernia: No hernia is present.      Comments: Epigastric tenderness on deep palpation, no rebound or rigidity   Musculoskeletal:      Comments: Positive for trace edema of the lower extremity   Skin:     General: Skin is warm and dry.      Capillary Refill: Capillary refill takes 2 to 3 seconds.      Coloration: Skin is not jaundiced or pale.      Findings: No bruising, erythema, lesion or rash.   Neurological:      General: No focal deficit present.      Mental Status: She is alert and oriented to person, place, and time. Mental status is at baseline.   Psychiatric:         Mood and Affect: Mood normal.         Behavior: Behavior normal.          Last Recorded Vitals  /64   Pulse 97   Temp 36.3 °C (97.3 °F)   Resp 15   Wt 73 kg (161 lb)   SpO2 100%     Relevant Results    Scheduled medications  amitriptyline, 10 mg, oral, Daily  amLODIPine, 5 mg, oral, Daily  atorvastatin, 20 mg, oral, Nightly  [START ON 2/26/2025] calcitriol, 0.25 mcg, oral, Every Mon/Wed/Fri  carvedilol, 25 mg, oral, BID  cetirizine, 10 mg, oral, Daily  cholecalciferol, 2,000 Units, oral, Daily  [Held by provider] epoetin palomo-epbx, 2,000 Units, subcutaneous, Every Mon/Wed/Fri  [START ON 2/26/2025] hydroxyurea, 500 mg, oral, Every Mon/Wed/Fri  losartan, 100 mg, oral, Daily  sennosides-docusate sodium, 1 tablet, oral, Nightly  vitamin B complex-vitamin C-folic acid, 1 capsule, oral,  Daily      Continuous medications     PRN medications  PRN medications: acetaminophen, meclizine, nitroglycerin, oxygen, sodium chloride  Results for orders placed or performed during the hospital encounter of 02/25/25 (from the past 24 hours)   CBC and Auto Differential   Result Value Ref Range    WBC 11.2 4.4 - 11.3 x10*3/uL    nRBC 0.0 0.0 - 0.0 /100 WBCs    RBC 2.93 (L) 4.00 - 5.20 x10*6/uL    Hemoglobin 9.8 (L) 12.0 - 16.0 g/dL    Hematocrit 30.1 (L) 36.0 - 46.0 %     (H) 80 - 100 fL    MCH 33.4 26.0 - 34.0 pg    MCHC 32.6 32.0 - 36.0 g/dL    RDW 13.9 11.5 - 14.5 %    Platelets 366 150 - 450 x10*3/uL    Neutrophils % 71.4 40.0 - 80.0 %    Immature Granulocytes %, Automated 1.4 (H) 0.0 - 0.9 %    Lymphocytes % 14.5 13.0 - 44.0 %    Monocytes % 8.8 2.0 - 10.0 %    Eosinophils % 3.4 0.0 - 6.0 %    Basophils % 0.5 0.0 - 2.0 %    Neutrophils Absolute 7.96 (H) 1.60 - 5.50 x10*3/uL    Immature Granulocytes Absolute, Automated 0.16 0.00 - 0.50 x10*3/uL    Lymphocytes Absolute 1.62 0.80 - 3.00 x10*3/uL    Monocytes Absolute 0.98 (H) 0.05 - 0.80 x10*3/uL    Eosinophils Absolute 0.38 0.00 - 0.40 x10*3/uL    Basophils Absolute 0.06 0.00 - 0.10 x10*3/uL   Magnesium   Result Value Ref Range    Magnesium 1.94 1.60 - 2.40 mg/dL   Comprehensive metabolic panel   Result Value Ref Range    Glucose 142 (H) 74 - 99 mg/dL    Sodium 136 136 - 145 mmol/L    Potassium 3.7 3.5 - 5.3 mmol/L    Chloride 99 98 - 107 mmol/L    Bicarbonate 25 21 - 32 mmol/L    Anion Gap 16 10 - 20 mmol/L    Urea Nitrogen 32 (H) 6 - 23 mg/dL    Creatinine 4.27 (H) 0.50 - 1.05 mg/dL    eGFR 10 (L) >60 mL/min/1.73m*2    Calcium 9.2 8.6 - 10.3 mg/dL    Albumin 3.7 3.4 - 5.0 g/dL    Alkaline Phosphatase 199 (H) 33 - 136 U/L    Total Protein 6.7 6.4 - 8.2 g/dL    AST 26 9 - 39 U/L    Bilirubin, Total 0.4 0.0 - 1.2 mg/dL    ALT 23 7 - 45 U/L   Lipase   Result Value Ref Range    Lipase 28 9 - 82 U/L   Lactate   Result Value Ref Range    Lactate 1.5 0.4 - 2.0  mmol/L   Troponin I, High Sensitivity, Initial   Result Value Ref Range    Troponin I, High Sensitivity 36 (H) 0 - 13 ng/L   BLOOD GAS VENOUS   Result Value Ref Range    POCT pH, Venous 7.40 7.33 - 7.43 pH    POCT pCO2, Venous 41 41 - 51 mm Hg    POCT pO2, Venous 54 (H) 35 - 45 mm Hg    POCT SO2, Venous 88 (H) 45 - 75 %    POCT Oxy Hemoglobin, Venous 85.9 (H) 45.0 - 75.0 %    POCT Base Excess, Venous 0.5 -2.0 - 3.0 mmol/L    POCT HCO3 Calculated, Venous 25.4 22.0 - 26.0 mmol/L    Patient Temperature      FiO2 28 %   Sars-CoV-2 and Influenza A/B PCR   Result Value Ref Range    Flu A Result Not Detected Not Detected    Flu B Result Not Detected Not Detected    Coronavirus 2019, PCR Detected (A) Not Detected   RSV PCR   Result Value Ref Range    RSV PCR Not Detected Not Detected   Troponin, High Sensitivity, 1 Hour   Result Value Ref Range    Troponin I, High Sensitivity 37 (H) 0 - 13 ng/L     *Note: Due to a large number of results and/or encounters for the requested time period, some results have not been displayed. A complete set of results can be found in Results Review.     CT chest abdomen pelvis wo IV contrast    Result Date: 2/25/2025  Interpreted By:  Trice Danielson, STUDY: CT CHEST ABDOMEN PELVIS WO CONTRAST;  2/25/2025 6:55 am   INDICATION: Signs/Symptoms:recent pneumonia, vomiting, dialysis patient.   COMPARISON: CT chest dated 02/07/2025; CT abdomen pelvis dated 01/22/2005   ACCESSION NUMBER(S): [id number]   ORDERING CLINICIAN: REANNA INIGUEZ   TECHNIQUE: CT of the chest, abdomen, and pelvis was performed without intravenous contrast. Sagittal and coronal reconstructions were generated.   FINDINGS: CHEST:   LUNG/PLEURA/LARGE AIRWAYS: There is apical pleural disease with calcifications.   There is basilar atelectasis and/or scarring.   There are bilateral small pleural effusions.   VESSELS: There are atherosclerotic changes of the aorta. The cava and main pulmonary arteries are unremarkable.   HEART:  Heart is normal in size. There are dense calcifications of the mitral annulus.   There is a small amount of pericardial fluid or thickening.   MEDIASTINUM AND CHARLEE: There are numerous slightly prominent mediastinal lymph nodes.   CHEST WALL AND LOWER NECK: The thyroid as visualized is unremarkable.   There are degenerative changes of the spine.   ABDOMEN:   LIVER: Unremarkable   BILE DUCTS: Remarkable   GALLBLADDER: There are small stones in a nondistended gallbladder.   PANCREAS: Unremarkable   SPLEEN: Unremarkable   ADRENAL GLANDS: There are no adrenal masses.   KIDNEYS AND URETERS: The kidneys are atrophic. There is no hydronephrosis. There are no renal calculi.   PELVIS:   BLADDER: Unremarkable   REPRODUCTIVE ORGANS: The uterus is visualized. The ovaries are faintly suggested.   BOWEL: There is no significant bowel distention. There are colonic diverticula.   There is a suggestion of a normal caliber appendix.   VESSELS: There are atherosclerotic changes of the aorta. There is suggestion of a 9 mm splenic artery aneurysm.   The cava is unremarkable.   PERITONEUM/RETROPERITONEUM/LYMPH NODES: There is no free air or free fluid.   There is no significant lymphadenopathy.   BONE AND SOFT TISSUE: The patient is scoliotic. There are degenerative changes of the spine and hips.   COMPARISON OF FINDINGS: The pleural effusions are smaller than on the prior exam.   The abdomen and pelvis are similar.       CHEST: Interval slight decrease in the small pleural effusions.   Slightly enlarged mediastinal lymph nodes similar to the prior exam.   Interstitial lung disease.   Cholelithiasis.   Renal atrophy.   ABDOMEN-PELVIS:   Signed by: Trice Danielson 2/25/2025 8:30 AM Dictation workstation:   [id number]    CT head wo IV contrast    Result Date: 2/25/2025  Interpreted By:  Trice Danielson, STUDY: CT HEAD WO IV CONTRAST; ;  2/25/2025 6:55 am   INDICATION: Signs/Symptoms:very high bp eval for bleed.   COMPARISON: 10/30/2024    ACCESSION NUMBER(S): [id number]   ORDERING CLINICIAN: REANNA INIGUEZ   TECHNIQUE: Serial axial images of the head were obtained without intravenous contrast. Sagittal and coronal reconstructions were generated.   FINDINGS: The ventricles are midline and normal in size.   There are no acute parenchymal abnormalities. There is her of encephalomalacia in the right cerebellar hemisphere.   There is no hemorrhage or extra-axial fluid.   The paranasal sinuses and mastoids are unremarkable.   There is no obvious skull fracture. There is no obvious scalp hematoma.   Patient is status post bilateral cataract surgery.   COMPARISON OF FINDINGS: The brain is similar.       No acute findings. Old right cerebellar infarct.   MACRO: none   Signed by: Trice Danielson 2/25/2025 7:59 AM Dictation workstation:   [id number]           Assessment/Plan   She is 79 years old female with past medical history of end-stage renal disease on dialysis Monday, Wednesday, Friday, hypertension, history of breast cancer, essential thrombocytosis, PAD, diastolic CHF, MDD, osteoporosis, obstructive sleep apnea on CPAP who presented to [facility] ED for nausea and vomiting.  She tested positive for COVID and was admitted in the hospital.     Nausea and vomiting  Differential diagnosis could be due to viral infection, COVID versus emergency hypertension versus abdominal origin such as ulcers, pancreatic lesion.  -Patient had CT of the head which was negative for any acute event  -CT of the abdomen excluded any surgical disease, lesion or tumor  -She tested positive for COVID which can explain her symptoms.    COVID  Suspect this is the cause of above  Patient is not requiring additional supplemental oxygen will not initiate remdesivir dexamethasone  Will continue to monitor overnight    Chronic proximal respiratory failure  Patient reports she is on supplemental oxygen at 2 L a minute at rest and 3 L with activity  Follows Dr. Manning as an  outpatient      Elevated troponin /likely due to demand ischemia  Troponin was 36 and 37 most likely due to increased demand.   EKG was not showing ST changes.   Troponins are chronically elevated    Elevated blood pressure  -High blood pressure with headache.  -She had CT scan of the head which excluded any bleeding or acute event  -Patient is status post hydralazine in the Ed   - we want the BP to go slowly down   -Will continue her home medication, losartan 100 mg, amlodipine 5 mg.   -Will monitor her symptoms and BP. If  BP more than 160 patient can have hydralazin 10 mg.     End-stage renal disease, patient does dialysis on M/W/F  Anemia of chronic disease, most likely due to ESRD  -Hg is around 10, and red blood cells between 2.82- 3.22  -Hemoglobin today is 9.8  -Will monitor  -Transfusion if hemoglobin is below 7.  -consult nephrology    Diastolic CHF  -TTE done in June 2023 was showing impaired relaxation pattern of the left ventricular diastolic filling with ejection fraction of 55 to 60%  -Carvedilol    Dyslipidemia  PAD  -Patient is on atorvastatin 20 mg tablet daily    Essential thrombocytopenia  -Currently she is taking hydroxyurea 500 mg    Splenic artery aneurism   -9 mm  Incidental findings     Fluids: None  Electrolytes: Will replace as needed   Nutrition: Renal diet  GI Ppx: pantoprazol  DVT Ppx: Heparin  Code Status: DNR / DNI as confirmed by the patient     Dispo; patient is admitted for intractable nausea and vomiting, if this improves overnight suspect she will be stable for discharge in a.m.    Luisa Mohr MD    Patient is seen and examined with the resident.  I have made changes to the note above that is consistent with my findings and plan

## 2025-02-25 NOTE — PROGRESS NOTES
Pharmacy Medication History Review    Joyce Bethea is a 79 y.o. female admitted for Nausea and vomiting. Pharmacy reviewed the patient's [id number] medications and allergies for accuracy.    The list below reflectives the updated PTA list. Please review each medication in order reconciliation for additional clarification and justification.  Prior to Admission Medications   Prescriptions Last Dose Informant Patient Reported? Taking?   B complex-vitamin C-folic acid (Nephrocaps) 1 mg capsule 2/24/2025 at  9:00 AM Other, Self Yes Yes   Sig: Take 1 capsule by mouth once daily.   Bacillus coagulans/inulin (PROBIOTIC FORMULA, INULIN, ORAL) 2/23/2025 at  9:00 AM Other, Self Yes Yes   Sig: Take 1 capsule by mouth once daily.   acetaminophen (Tylenol) 325 mg tablet Unknown Other, Self Yes Yes   Sig: Take 2 tablets (650 mg) by mouth every 8 hours if needed for mild pain (1 - 3), headaches or fever (temp greater than 38.0 C). Max 2000mg daily   amLODIPine (Norvasc) 5 mg tablet 2/23/2025 at  9:00 AM Other, Self Yes Yes   Sig: Take 1 tablet (5 mg) by mouth once daily.   amitriptyline (Elavil) 10 mg tablet 2/24/2025 at  9:00 PM Other, Self Yes Yes   Sig: Take 1 tablet (10 mg) by mouth once daily.   atorvastatin (Lipitor) 20 mg tablet Unknown at  9:00 PM Other, Self Yes Yes   Sig: Take 1 tablet (20 mg) by mouth once daily at bedtime.   calcitriol (Rocaltrol) 0.25 mcg capsule 2/24/2025 at  9:00 AM Other, Self Yes Yes   Sig: Take 1 capsule (0.25 mcg) by mouth once a day on Monday, Wednesday, and Friday.   calcium carbonate (Tums) 200 mg calcium chewable tablet Unknown Other, Self Yes Yes   Sig: Chew 3-4 tablets (1,500-2,000 mg) 3 times a day as needed for indigestion or heartburn.   carvedilol (Coreg) 25 mg tablet 2/24/2025 Evening Other, Self Yes Yes   Sig: Take 1 tablet (25 mg) by mouth 2 times a day.   cholecalciferol (Vitamin D-3) 50 mcg (2,000 unit) capsule 2/24/2025 Morning Other, Self Yes Yes   Sig: Take 1  capsule (50 mcg) by mouth early in the morning..   epoetin palomo (Procrit) 2,000 unit/mL injection 2/24/2025 at  9:00 AM Other, Self Yes Yes   Sig: Inject 1 mL (2,000 Units) under the skin once a day on Monday, Wednesday, and Friday.   guaiFENesin (Mucinex) 600 mg 12 hr tablet Unknown Other, Self Yes Yes   Sig: Take 2 tablets (1,200 mg) by mouth 2 times a day as needed for cough. Do not crush, chew, or split.   hydroxyurea (Hydrea) 500 mg capsule 2/24/2025 at  9:00 AM Other, Self Yes Yes   Sig: Take 1 capsule (500 mg total) by mouth once a day on Monday, Wednesday, and Friday.  M, W, F   loratadine (Claritin) 10 mg tablet Unknown Other, Self Yes Yes   Sig: Take 1 tablet (10 mg) by mouth once daily as needed for allergies. In the morning   losartan (Cozaar) 100 mg tablet 2/23/2025 at  9:00 AM Other, Self Yes Yes   Sig: Take 1 tablet (100 mg) by mouth once daily.   meclizine (Antivert) 12.5 mg tablet Unknown Other, Self Yes Yes   Sig: Take 1 tablet (12.5 mg) by mouth 3 times a day as needed for dizziness.   nitroglycerin (Nitrostat) 0.4 mg SL tablet Unknown Other, Self Yes Yes   Sig: Place 1 tablet (0.4 mg) under the tongue every 5 minutes if needed for chest pain.   omeprazole (PriLOSEC) 20 mg DR capsule Unknown Other, Self Yes Yes   Sig: Take 1 capsule (20 mg) by mouth once daily as needed.   oxygen (O2) gas therapy  Other, Self     Sig: Oxygen   Refills: 0       Active   sennosides-docusate sodium (Senna-S) 8.6-50 mg tablet Unknown at  9:00 PM Other, Self Yes Yes   Sig: Take 1 tablet by mouth as needed at bedtime.   sodium chloride (Ocean) 0.65 % nasal spray Unknown Other, Self Yes Yes   Sig: Administer 2 sprays into each nostril every 1 hour if needed for congestion.   trolamine salicylate (Aspercreme) 10 % cream Unknown Other, Self Yes Yes   Sig: Apply 1 Application topically 3 times a day. To upper arms      Facility-Administered Medications: None           The list below reflectives the updated allergy list.  Please review each documented allergy for additional clarification and justification.  Allergies  Reviewed by Luis Fernando Veras MD on 2/25/2025        Severity Reactions Comments    Bee Pollen Not Specified Unknown     House Dust Not Specified Unknown     House Dust Mite Not Specified Unknown     Adhesive Tape-silicones Low Rash             Below are additional concerns with the patient's PTA list.      Jaja Mueller

## 2025-02-26 ENCOUNTER — APPOINTMENT (OUTPATIENT)
Dept: DIALYSIS | Facility: HOSPITAL | Age: 80
End: 2025-02-26
Payer: MEDICARE

## 2025-02-26 VITALS
OXYGEN SATURATION: 97 % | SYSTOLIC BLOOD PRESSURE: 169 MMHG | DIASTOLIC BLOOD PRESSURE: 62 MMHG | WEIGHT: 161 LBS | TEMPERATURE: 98 F | HEART RATE: 87 BPM | RESPIRATION RATE: 21 BRPM | BODY MASS INDEX: 31.61 KG/M2 | HEIGHT: 60 IN

## 2025-02-26 LAB
ALBUMIN SERPL BCP-MCNC: 3.4 G/DL (ref 3.4–5)
ALP SERPL-CCNC: 194 U/L (ref 33–136)
ALT SERPL W P-5'-P-CCNC: 17 U/L (ref 7–45)
ANION GAP SERPL CALC-SCNC: 15 MMOL/L (ref 10–20)
AST SERPL W P-5'-P-CCNC: 16 U/L (ref 9–39)
ATRIAL RATE: 74 BPM
BILIRUB SERPL-MCNC: 0.3 MG/DL (ref 0–1.2)
BUN SERPL-MCNC: 53 MG/DL (ref 6–23)
CALCIUM SERPL-MCNC: 8.9 MG/DL (ref 8.6–10.3)
CHLORIDE SERPL-SCNC: 99 MMOL/L (ref 98–107)
CO2 SERPL-SCNC: 26 MMOL/L (ref 21–32)
CREAT SERPL-MCNC: 6.55 MG/DL (ref 0.5–1.05)
EGFRCR SERPLBLD CKD-EPI 2021: 6 ML/MIN/1.73M*2
ERYTHROCYTE [DISTWIDTH] IN BLOOD BY AUTOMATED COUNT: 14.1 % (ref 11.5–14.5)
GLUCOSE SERPL-MCNC: 106 MG/DL (ref 74–99)
HCT VFR BLD AUTO: 30.4 % (ref 36–46)
HGB BLD-MCNC: 10 G/DL (ref 12–16)
MCH RBC QN AUTO: 33.6 PG (ref 26–34)
MCHC RBC AUTO-ENTMCNC: 32.9 G/DL (ref 32–36)
MCV RBC AUTO: 102 FL (ref 80–100)
NRBC BLD-RTO: 0.2 /100 WBCS (ref 0–0)
P AXIS: 44 DEGREES
P OFFSET: 184 MS
P ONSET: 133 MS
PLATELET # BLD AUTO: 360 X10*3/UL (ref 150–450)
POTASSIUM SERPL-SCNC: 4.4 MMOL/L (ref 3.5–5.3)
PR INTERVAL: 168 MS
PROT SERPL-MCNC: 6.1 G/DL (ref 6.4–8.2)
Q ONSET: 217 MS
QRS COUNT: 13 BEATS
QRS DURATION: 94 MS
QT INTERVAL: 446 MS
QTC CALCULATION(BAZETT): 495 MS
QTC FREDERICIA: 478 MS
R AXIS: -1 DEGREES
RBC # BLD AUTO: 2.98 X10*6/UL (ref 4–5.2)
SODIUM SERPL-SCNC: 136 MMOL/L (ref 136–145)
T AXIS: 38 DEGREES
T OFFSET: 440 MS
VENTRICULAR RATE: 74 BPM
WBC # BLD AUTO: 9.2 X10*3/UL (ref 4.4–11.3)

## 2025-02-26 PROCEDURE — 85027 COMPLETE CBC AUTOMATED: CPT | Performed by: FAMILY MEDICINE

## 2025-02-26 PROCEDURE — 2500000001 HC RX 250 WO HCPCS SELF ADMINISTERED DRUGS (ALT 637 FOR MEDICARE OP)

## 2025-02-26 PROCEDURE — 90937 HEMODIALYSIS REPEATED EVAL: CPT

## 2025-02-26 PROCEDURE — 36415 COLL VENOUS BLD VENIPUNCTURE: CPT | Performed by: FAMILY MEDICINE

## 2025-02-26 PROCEDURE — 84075 ASSAY ALKALINE PHOSPHATASE: CPT | Performed by: FAMILY MEDICINE

## 2025-02-26 PROCEDURE — 2500000004 HC RX 250 GENERAL PHARMACY W/ HCPCS (ALT 636 FOR OP/ED)

## 2025-02-26 PROCEDURE — 2500000001 HC RX 250 WO HCPCS SELF ADMINISTERED DRUGS (ALT 637 FOR MEDICARE OP): Performed by: FAMILY MEDICINE

## 2025-02-26 RX ORDER — ONDANSETRON 4 MG/1
4 TABLET, ORALLY DISINTEGRATING ORAL EVERY 8 HOURS PRN
Qty: 20 TABLET | Refills: 0 | Status: SHIPPED | OUTPATIENT
Start: 2025-02-26

## 2025-02-26 RX ADMIN — PANTOPRAZOLE SODIUM 40 MG: 40 TABLET, DELAYED RELEASE ORAL at 10:52

## 2025-02-26 RX ADMIN — Medication 1 CAPSULE: at 10:52

## 2025-02-26 RX ADMIN — Medication 2000 UNITS: at 10:52

## 2025-02-26 RX ADMIN — HYDROXYUREA 500 MG: 500 CAPSULE ORAL at 10:52

## 2025-02-26 RX ADMIN — CALCITRIOL CAPSULES 0.25 MCG 0.25 MCG: 0.25 CAPSULE ORAL at 10:52

## 2025-02-26 ASSESSMENT — ACTIVITIES OF DAILY LIVING (ADL): LACK_OF_TRANSPORTATION: NO

## 2025-02-26 ASSESSMENT — PAIN - FUNCTIONAL ASSESSMENT: PAIN_FUNCTIONAL_ASSESSMENT: NO/DENIES PAIN

## 2025-02-26 NOTE — PROGRESS NOTES
Joyce Bethea is a 79 y.o. female on day 0 of admission presenting with Nausea and vomiting.      Subjective   Here for N/V. Noted to be COVID + so being admitted for monitoring. No hypoxia so not being tx with remdisivir or steroids. CT chest shows bl effusions and atelectasis - no air space disease.  which seems ot be baseline and c/w previous values. Last HD was on Mon. Feeling better       Objective   NAD. Awake and alert. No distress. Appear comfortable       Vitals 24HR  Heart Rate:  []   Temperature:  [36 °C (96.8 °F)]   Respirations:  [14-23]   BP: (139-187)/(52-74)   Pulse Ox:  [93 %-100 %]   Intake/Output last 3 Shifts:  No intake or output data in the 24 hours ending 02/26/25 0928    Physical Exam    Relevant Results               Assessment/Plan      ESRD on HD  HTNsive CKD  HTN reasonable control for acute illness    - HD today. Goal UF 1L  - Cont thrice weekly HD next on Fri  - Okay for discharge when other med issues resolved/stable        Assessment & Plan  Nausea and vomiting    Nausea and vomiting, unspecified vomiting type                    Jewell Kasper MD

## 2025-02-26 NOTE — PRE-PROCEDURE NOTE
Report from Sending RN:    Report From: Kady Madrigal  Recent Surgery of Procedure: No  Baseline Level of Consciousness (LOC): a+ox3  Oxygen Use: Yes  Type: 2L  Diabetic: No  Last BP Med Given Day of Dialysis: See EMR  Last Pain Med Given: SEE  EMR  Lab Tests to be Obtained with Dialysis: No  Blood Transfusion to be Given During Dialysis: No  Available IV Access: Yes  Medications to be Administered During Dialysis: No  Continuous IV Infusion Running: No  Restraints on Currently or in the Last 24 Hours: No  Hand-Off Communication: PATIENT STABLE COVID+  Dialysis Catheter Dressing: N/A  Last Dressing Change: N/A

## 2025-02-26 NOTE — POST-PROCEDURE NOTE
Report to Receiving RN:    Report To: Kady Jackson  Time Report Called: [id number]  Hand-Off Communication: via popchips chat patient status   Complications During Treatment: No  Ultrafiltration Treatment: No  Medications Administered During Dialysis: No  Blood Products Administered During Dialysis: No  Labs Sent During Dialysis: No  Heparin Drip Rate Changes: No  Dialysis Catheter Dressing: n/a  Last Dressing Change: n/a    Electronic Signatures:  Taylor Mast OCDT    Last Updated: 3:46 PM by TAYLOR MAST

## 2025-02-26 NOTE — DISCHARGE SUMMARY
Discharge Diagnosis  Nausea and vomiting COVID infection, chronic hypoxic respiratory failure, demand ischemia, elevated blood pressure, end-stage renal disease, anemia of chronic disease, diastolic heart failure, hyperlipidemia, PAD, essential thrombocytopenia, splenic artery aneurysm    Issues Requiring Follow-Up  Please follow-up with primary care provider due to this hospitalization, COVID 19, and continued observation of the splenic artery aneurysm    Discharge Meds     Medication List      START taking these medications     ondansetron ODT 4 mg disintegrating tablet; Commonly known as:   Zofran-ODT; Dissolve 1 tablet (4 mg) in the mouth every 8 hours if needed   for nausea or vomiting.     CONTINUE taking these medications     acetaminophen 325 mg tablet; Commonly known as: Tylenol   amitriptyline 10 mg tablet; Commonly known as: Elavil   amLODIPine 5 mg tablet; Commonly known as: Norvasc; Take 1 tablet (5 mg)   by mouth once daily.   atorvastatin 20 mg tablet; Commonly known as: Lipitor; Take 1 tablet (20   mg) by mouth once daily at bedtime.   calcitriol 0.25 mcg capsule; Commonly known as: Rocaltrol   calcium carbonate 200 mg calcium chewable tablet; Commonly known as:   Tums   carvedilol 25 mg tablet; Commonly known as: Coreg   cholecalciferol 50 mcg (2,000 unit) capsule; Commonly known as: Vitamin   D-3   guaiFENesin 600 mg 12 hr tablet; Commonly known as: Mucinex   hydroxyurea 500 mg capsule; Commonly known as: Hydrea; Take 1 capsule   (500 mg total) by mouth once a day on Monday, Wednesday, and Friday.  M,   W, F   loratadine 10 mg tablet; Commonly known as: Claritin   losartan 100 mg tablet; Commonly known as: Cozaar   meclizine 12.5 mg tablet; Commonly known as: Antivert; Take 1 tablet   (12.5 mg) by mouth 3 times a day as needed for dizziness.   nitroglycerin 0.4 mg SL tablet; Commonly known as: Nitrostat   omeprazole 20 mg DR capsule; Commonly known as: PriLOSEC   oxygen gas therapy; Commonly known  as: O2   PROBIOTIC FORMULA (INULIN) ORAL   Procrit 2,000 unit/mL injection; Generic drug: epoetin palomo   Senna-S 8.6-50 mg tablet; Generic drug: sennosides-docusate sodium   sodium chloride 0.65 % nasal spray; Commonly known as: Ocean   trolamine salicylate 10 % cream; Commonly known as: Aspercreme   vitamin B complex-vitamin C-folic acid 1 mg capsule; Commonly known as:   Nephrocaps       Test Results Pending At Discharge  Pending Labs       No current pending labs.            Hospital Course   This 79-year-old female with a history of end-stage renal disease, hypertension, breast cancer, essential thrombocytosis, peripheral arterial disease, diastolic heart failure, major depressive disorder, osteoporosis, struct sleep apnea on CPAP who presented to the emergency room with nausea and vomiting.  In the emergency room she was found to have COVID-19 however did not have any increased demand for oxygen.  In the emergency room troponin was found to be mildly elevated but EKG showed no acute ST changes.  The troponin elevation was attributed to his stage renal disease and COVID infection.  Nephrology was consulted and the patient had hemodialysis on Wednesday with her prior schedule.  Upon return to the floor she is able to tolerate a diet and was set up for discharge.  CT of chest abdomen pelvis revealed an incidental finding of a 9 mm splenic artery aneurysm.  She is advised to follow-up with her primary care provider for continued surveillance.    Pertinent Physical Exam At Time of Discharge  Physical Exam  Gen: lying comfortably in bed, not in acute distress  HEENT: atraumatic, normocephalic  Pulm: normal respiratory effort, clear to auscultation b/l  Cardiac: RRR, no murmurs noted, normal S1/S2  GI: Soft, nontender, BS+  MSK: normal ROM without joint swelling  Extremities: no LE edema, cyanosis  Neuro: AOX3, CN II-XII grossly intact, equal b/l strength, no loss in sensation   Psych: calm and appropriate for  situation   Outpatient Follow-Up  Future Appointments   Date Time Provider Department Center   5/15/2025 10:30 AM ANASTASIA Acevedo-CNP [id number] [facility]   6/19/2025  1:00 PM Azul Zuñiga PA-C [id number] [facility]   10/28/2025  2:20 PM Mercedes Manning MD [id number] [facility]         Ivan Clayton, DO

## 2025-02-26 NOTE — NURSING NOTE
[facility] here to  pt and return to [facility]. Report called to RN. All belongings went with pt.

## 2025-02-26 NOTE — PROGRESS NOTES
02/26/25 0806   Discharge Planning   Living Arrangements Other (Comment)  (From [facility])   Support Systems Faith/jami community;Friends/neighbors   Assistance Needed A&OX4; independent / assist at times for ADLs with cane and walker; doesn't drive; O2 baseline: 2.5L NC at rest 3-4L NC on exertion ([employer]); PCP Rosanna Barger CNP; dialysis MWF 9am at [facility]   Type of Residence Skilled nursing facility  (Pt from [facility] and can return when ready no precert needed)   Do you have animals or pets at home? No   Who is requesting discharge planning? Provider   Expected Discharge Disposition SNF  (Pt from [facility]. Pt has dialysis MWF 9am at [facility]. Pt last had dialysis Mon 2/24)   Does the patient need discharge transport arranged? Yes   RoundTrip coordination needed? Yes   Has discharge transport been arranged? No   Financial Resource Strain   How hard is it for you to pay for the very basics like food, housing, medical care, and heating? Not hard   Housing Stability   In the last 12 months, was there a time when you were not able to pay the mortgage or rent on time? N   In the past 12 months, how many times have you moved where you were living? 0   At any time in the past 12 months, were you homeless or living in a shelter (including now)? N   Transportation Needs   In the past 12 months, has lack of transportation kept you from medical appointments or from getting medications? no   In the past 12 months, has lack of transportation kept you from meetings, work, or from getting things needed for daily living? No     02/26/2025 0806am  Patient holding in ED awaiting for bed     02/26/25 1500   Discharge Planning   Expected Discharge Disposition SNF  (DC dispo is patient to return from dialysis by 4pm. Pt needs to tolerate diet ([facility]in[place] RN 2S ordered dinner) and then patient cleared to dc back to [facility]  [phone]. Transport requested for 6pm -[facility]y Wren [facility])

## 2025-02-27 ENCOUNTER — APPOINTMENT (OUTPATIENT)
Dept: PULMONOLOGY | Facility: CLINIC | Age: 80
End: 2025-02-27
Payer: MEDICARE

## 2025-03-03 ENCOUNTER — LAB REQUISITION (OUTPATIENT)
Dept: LAB | Facility: HOSPITAL | Age: 80
End: 2025-03-03
Payer: MEDICARE

## 2025-03-03 DIAGNOSIS — D64.9 ANEMIA, UNSPECIFIED: ICD-10-CM

## 2025-03-03 LAB
ERYTHROCYTE [DISTWIDTH] IN BLOOD BY AUTOMATED COUNT: 15 % (ref 11.5–14.5)
HCT VFR BLD AUTO: 31.7 % (ref 36–46)
HGB BLD-MCNC: 10.3 G/DL (ref 12–16)
MCH RBC QN AUTO: 33.9 PG (ref 26–34)
MCHC RBC AUTO-ENTMCNC: 32.5 G/DL (ref 32–36)
MCV RBC AUTO: 104 FL (ref 80–100)
NRBC BLD-RTO: 0.2 /100 WBCS (ref 0–0)
PLATELET # BLD AUTO: 342 X10*3/UL (ref 150–450)
RBC # BLD AUTO: 3.04 X10*6/UL (ref 4–5.2)
WBC # BLD AUTO: 13 X10*3/UL (ref 4.4–11.3)

## 2025-03-03 PROCEDURE — 85027 COMPLETE CBC AUTOMATED: CPT | Mod: OUT | Performed by: REGISTERED NURSE

## 2025-03-26 DIAGNOSIS — G47.33 OSA ON CPAP: ICD-10-CM

## 2025-03-27 DIAGNOSIS — R06.00 DYSPNEA, UNSPECIFIED TYPE: Primary | ICD-10-CM

## 2025-03-27 RX ORDER — PREDNISONE 20 MG/1
40 TABLET ORAL DAILY
Qty: 10 TABLET | Refills: 0 | Status: SHIPPED | OUTPATIENT
Start: 2025-03-27 | End: 2025-04-01

## 2025-04-07 ENCOUNTER — APPOINTMENT (OUTPATIENT)
Dept: RADIOLOGY | Facility: HOSPITAL | Age: 80
End: 2025-04-07
Payer: MEDICARE

## 2025-04-07 ENCOUNTER — HOSPITAL ENCOUNTER (OUTPATIENT)
Facility: HOSPITAL | Age: 80
Setting detail: OBSERVATION
Discharge: NURSING, REHAB OR LONG-TERM CARE | End: 2025-04-09
Attending: EMERGENCY MEDICINE | Admitting: INTERNAL MEDICINE
Payer: MEDICARE

## 2025-04-07 ENCOUNTER — APPOINTMENT (OUTPATIENT)
Dept: CARDIOLOGY | Facility: HOSPITAL | Age: 80
End: 2025-04-07
Payer: MEDICARE

## 2025-04-07 DIAGNOSIS — E86.0 DEHYDRATION: ICD-10-CM

## 2025-04-07 DIAGNOSIS — R55 SYNCOPE AND COLLAPSE: Primary | ICD-10-CM

## 2025-04-07 LAB
ALBUMIN SERPL BCP-MCNC: 3.8 G/DL (ref 3.4–5)
ALP SERPL-CCNC: 179 U/L (ref 33–136)
ALT SERPL W P-5'-P-CCNC: 31 U/L (ref 7–45)
ANION GAP SERPL CALC-SCNC: 14 MMOL/L (ref 10–20)
AST SERPL W P-5'-P-CCNC: 30 U/L (ref 9–39)
BASOPHILS # BLD AUTO: 0.02 X10*3/UL (ref 0–0.1)
BASOPHILS NFR BLD AUTO: 0.2 %
BILIRUB SERPL-MCNC: 0.5 MG/DL (ref 0–1.2)
BUN SERPL-MCNC: 22 MG/DL (ref 6–23)
CALCIUM SERPL-MCNC: 8.4 MG/DL (ref 8.6–10.3)
CARDIAC TROPONIN I PNL SERPL HS: 30 NG/L (ref 0–13)
CARDIAC TROPONIN I PNL SERPL HS: 40 NG/L (ref 0–13)
CHLORIDE SERPL-SCNC: 99 MMOL/L (ref 98–107)
CO2 SERPL-SCNC: 25 MMOL/L (ref 21–32)
CREAT SERPL-MCNC: 3.21 MG/DL (ref 0.5–1.05)
EGFRCR SERPLBLD CKD-EPI 2021: 14 ML/MIN/1.73M*2
EOSINOPHIL # BLD AUTO: 0.34 X10*3/UL (ref 0–0.4)
EOSINOPHIL NFR BLD AUTO: 3.8 %
ERYTHROCYTE [DISTWIDTH] IN BLOOD BY AUTOMATED COUNT: 16.7 % (ref 11.5–14.5)
GLUCOSE SERPL-MCNC: 175 MG/DL (ref 74–99)
HCT VFR BLD AUTO: 36.9 % (ref 36–46)
HGB BLD-MCNC: 11.9 G/DL (ref 12–16)
IMM GRANULOCYTES # BLD AUTO: 0.06 X10*3/UL (ref 0–0.5)
IMM GRANULOCYTES NFR BLD AUTO: 0.7 % (ref 0–0.9)
LYMPHOCYTES # BLD AUTO: 1.76 X10*3/UL (ref 0.8–3)
LYMPHOCYTES NFR BLD AUTO: 19.9 %
MAGNESIUM SERPL-MCNC: 1.79 MG/DL (ref 1.6–2.4)
MCH RBC QN AUTO: 34.3 PG (ref 26–34)
MCHC RBC AUTO-ENTMCNC: 32.2 G/DL (ref 32–36)
MCV RBC AUTO: 106 FL (ref 80–100)
MONOCYTES # BLD AUTO: 0.74 X10*3/UL (ref 0.05–0.8)
MONOCYTES NFR BLD AUTO: 8.4 %
NEUTROPHILS # BLD AUTO: 5.93 X10*3/UL (ref 1.6–5.5)
NEUTROPHILS NFR BLD AUTO: 67 %
NRBC BLD-RTO: 0 /100 WBCS (ref 0–0)
PLATELET # BLD AUTO: 236 X10*3/UL (ref 150–450)
POTASSIUM SERPL-SCNC: 3.7 MMOL/L (ref 3.5–5.3)
PROT SERPL-MCNC: 6.3 G/DL (ref 6.4–8.2)
RBC # BLD AUTO: 3.47 X10*6/UL (ref 4–5.2)
SODIUM SERPL-SCNC: 134 MMOL/L (ref 136–145)
WBC # BLD AUTO: 8.9 X10*3/UL (ref 4.4–11.3)

## 2025-04-07 PROCEDURE — 99285 EMERGENCY DEPT VISIT HI MDM: CPT | Mod: 25 | Performed by: EMERGENCY MEDICINE

## 2025-04-07 PROCEDURE — 99223 1ST HOSP IP/OBS HIGH 75: CPT | Performed by: INTERNAL MEDICINE

## 2025-04-07 PROCEDURE — 36415 COLL VENOUS BLD VENIPUNCTURE: CPT | Performed by: EMERGENCY MEDICINE

## 2025-04-07 PROCEDURE — 71045 X-RAY EXAM CHEST 1 VIEW: CPT | Performed by: RADIOLOGY

## 2025-04-07 PROCEDURE — 2500000001 HC RX 250 WO HCPCS SELF ADMINISTERED DRUGS (ALT 637 FOR MEDICARE OP): Performed by: INTERNAL MEDICINE

## 2025-04-07 PROCEDURE — 96374 THER/PROPH/DIAG INJ IV PUSH: CPT | Mod: 59

## 2025-04-07 PROCEDURE — 84484 ASSAY OF TROPONIN QUANT: CPT | Performed by: EMERGENCY MEDICINE

## 2025-04-07 PROCEDURE — 83735 ASSAY OF MAGNESIUM: CPT | Performed by: EMERGENCY MEDICINE

## 2025-04-07 PROCEDURE — 71045 X-RAY EXAM CHEST 1 VIEW: CPT

## 2025-04-07 PROCEDURE — 2500000001 HC RX 250 WO HCPCS SELF ADMINISTERED DRUGS (ALT 637 FOR MEDICARE OP): Performed by: NURSE PRACTITIONER

## 2025-04-07 PROCEDURE — 70450 CT HEAD/BRAIN W/O DYE: CPT | Performed by: RADIOLOGY

## 2025-04-07 PROCEDURE — G0378 HOSPITAL OBSERVATION PER HR: HCPCS

## 2025-04-07 PROCEDURE — 2500000004 HC RX 250 GENERAL PHARMACY W/ HCPCS (ALT 636 FOR OP/ED): Performed by: EMERGENCY MEDICINE

## 2025-04-07 PROCEDURE — 85025 COMPLETE CBC W/AUTO DIFF WBC: CPT | Performed by: EMERGENCY MEDICINE

## 2025-04-07 PROCEDURE — 93005 ELECTROCARDIOGRAM TRACING: CPT

## 2025-04-07 PROCEDURE — 80053 COMPREHEN METABOLIC PANEL: CPT | Performed by: EMERGENCY MEDICINE

## 2025-04-07 PROCEDURE — 70450 CT HEAD/BRAIN W/O DYE: CPT

## 2025-04-07 RX ORDER — ACETAMINOPHEN 325 MG/1
650 TABLET ORAL EVERY 8 HOURS PRN
Status: DISCONTINUED | OUTPATIENT
Start: 2025-04-07 | End: 2025-04-09 | Stop reason: HOSPADM

## 2025-04-07 RX ORDER — CALCIUM CARBONATE 200(500)MG
3 TABLET,CHEWABLE ORAL 3 TIMES DAILY PRN
Status: DISCONTINUED | OUTPATIENT
Start: 2025-04-07 | End: 2025-04-09 | Stop reason: HOSPADM

## 2025-04-07 RX ORDER — CARVEDILOL 25 MG/1
25 TABLET ORAL 2 TIMES DAILY
Status: DISCONTINUED | OUTPATIENT
Start: 2025-04-07 | End: 2025-04-09 | Stop reason: HOSPADM

## 2025-04-07 RX ORDER — AMOXICILLIN 250 MG
1 CAPSULE ORAL NIGHTLY PRN
Status: DISCONTINUED | OUTPATIENT
Start: 2025-04-07 | End: 2025-04-09 | Stop reason: HOSPADM

## 2025-04-07 RX ORDER — LOSARTAN POTASSIUM 50 MG/1
100 TABLET ORAL DAILY
Status: DISCONTINUED | OUTPATIENT
Start: 2025-04-08 | End: 2025-04-09 | Stop reason: HOSPADM

## 2025-04-07 RX ORDER — AMLODIPINE BESYLATE 5 MG/1
5 TABLET ORAL DAILY
Status: DISCONTINUED | OUTPATIENT
Start: 2025-04-08 | End: 2025-04-09 | Stop reason: HOSPADM

## 2025-04-07 RX ORDER — HYDRALAZINE HYDROCHLORIDE 20 MG/ML
10 INJECTION INTRAMUSCULAR; INTRAVENOUS ONCE
Status: DISCONTINUED | OUTPATIENT
Start: 2025-04-07 | End: 2025-04-07

## 2025-04-07 RX ORDER — CHOLECALCIFEROL (VITAMIN D3) 25 MCG
2000 TABLET ORAL DAILY
Status: DISCONTINUED | OUTPATIENT
Start: 2025-04-08 | End: 2025-04-09 | Stop reason: HOSPADM

## 2025-04-07 RX ORDER — CALCITRIOL 0.25 UG/1
0.25 CAPSULE ORAL
Status: DISCONTINUED | OUTPATIENT
Start: 2025-04-09 | End: 2025-04-09 | Stop reason: HOSPADM

## 2025-04-07 RX ORDER — HYDRALAZINE HYDROCHLORIDE 20 MG/ML
5 INJECTION INTRAMUSCULAR; INTRAVENOUS ONCE
Status: COMPLETED | OUTPATIENT
Start: 2025-04-07 | End: 2025-04-07

## 2025-04-07 RX ORDER — ATORVASTATIN CALCIUM 20 MG/1
20 TABLET, FILM COATED ORAL NIGHTLY
Status: DISCONTINUED | OUTPATIENT
Start: 2025-04-07 | End: 2025-04-09 | Stop reason: HOSPADM

## 2025-04-07 RX ORDER — PANTOPRAZOLE SODIUM 40 MG/1
40 TABLET, DELAYED RELEASE ORAL
Status: DISCONTINUED | OUTPATIENT
Start: 2025-04-08 | End: 2025-04-09 | Stop reason: HOSPADM

## 2025-04-07 RX ORDER — ACETAMINOPHEN 160 MG/5ML
650 SOLUTION ORAL EVERY 4 HOURS PRN
Status: DISCONTINUED | OUTPATIENT
Start: 2025-04-07 | End: 2025-04-07

## 2025-04-07 RX ORDER — ACETAMINOPHEN 325 MG/1
650 TABLET ORAL EVERY 4 HOURS PRN
Status: DISCONTINUED | OUTPATIENT
Start: 2025-04-07 | End: 2025-04-07

## 2025-04-07 RX ORDER — DOCUSATE SODIUM 100 MG/1
100 CAPSULE, LIQUID FILLED ORAL 2 TIMES DAILY
Status: DISCONTINUED | OUTPATIENT
Start: 2025-04-07 | End: 2025-04-09 | Stop reason: HOSPADM

## 2025-04-07 RX ORDER — AMITRIPTYLINE HYDROCHLORIDE 10 MG/1
10 TABLET, FILM COATED ORAL NIGHTLY
Status: DISCONTINUED | OUTPATIENT
Start: 2025-04-07 | End: 2025-04-09 | Stop reason: HOSPADM

## 2025-04-07 RX ORDER — NITROGLYCERIN 0.4 MG/1
0.4 TABLET SUBLINGUAL EVERY 5 MIN PRN
Status: DISCONTINUED | OUTPATIENT
Start: 2025-04-07 | End: 2025-04-09 | Stop reason: HOSPADM

## 2025-04-07 RX ORDER — HYDROXYUREA 500 MG/1
500 CAPSULE ORAL
Status: DISCONTINUED | OUTPATIENT
Start: 2025-04-09 | End: 2025-04-09 | Stop reason: HOSPADM

## 2025-04-07 RX ORDER — AMITRIPTYLINE HYDROCHLORIDE 10 MG/1
10 TABLET, FILM COATED ORAL DAILY
Status: DISCONTINUED | OUTPATIENT
Start: 2025-04-08 | End: 2025-04-07

## 2025-04-07 RX ORDER — ACETAMINOPHEN 650 MG/1
650 SUPPOSITORY RECTAL EVERY 4 HOURS PRN
Status: DISCONTINUED | OUTPATIENT
Start: 2025-04-07 | End: 2025-04-07

## 2025-04-07 RX ORDER — BENZONATATE 200 MG/1
200 CAPSULE ORAL 3 TIMES DAILY PRN
COMMUNITY

## 2025-04-07 RX ORDER — ACETAMINOPHEN 500 MG
10 TABLET ORAL NIGHTLY PRN
Status: DISCONTINUED | OUTPATIENT
Start: 2025-04-07 | End: 2025-04-09 | Stop reason: HOSPADM

## 2025-04-07 RX ORDER — GUAIFENESIN 600 MG/1
1200 TABLET, EXTENDED RELEASE ORAL 2 TIMES DAILY PRN
Status: DISCONTINUED | OUTPATIENT
Start: 2025-04-07 | End: 2025-04-09 | Stop reason: HOSPADM

## 2025-04-07 RX ORDER — MECLIZINE HYDROCHLORIDE 25 MG/1
12.5 TABLET ORAL 3 TIMES DAILY PRN
Status: DISCONTINUED | OUTPATIENT
Start: 2025-04-07 | End: 2025-04-09 | Stop reason: HOSPADM

## 2025-04-07 RX ADMIN — Medication 10 MG: at 23:18

## 2025-04-07 RX ADMIN — DOCUSATE SODIUM 100 MG: 100 CAPSULE, LIQUID FILLED ORAL at 22:40

## 2025-04-07 RX ADMIN — ATORVASTATIN CALCIUM 20 MG: 20 TABLET, FILM COATED ORAL at 22:40

## 2025-04-07 RX ADMIN — HYDRALAZINE HYDROCHLORIDE 5 MG: 20 INJECTION INTRAMUSCULAR; INTRAVENOUS at 21:25

## 2025-04-07 RX ADMIN — SODIUM CHLORIDE 1000 ML: 9 INJECTION, SOLUTION INTRAVENOUS at 18:40

## 2025-04-07 RX ADMIN — CARVEDILOL 25 MG: 25 TABLET, FILM COATED ORAL at 22:40

## 2025-04-07 RX ADMIN — AMITRIPTYLINE HYDROCHLORIDE 10 MG: 10 TABLET, FILM COATED ORAL at 23:18

## 2025-04-07 SDOH — SOCIAL STABILITY: SOCIAL INSECURITY: WITHIN THE LAST YEAR, HAVE YOU BEEN AFRAID OF YOUR PARTNER OR EX-PARTNER?: NO

## 2025-04-07 SDOH — SOCIAL STABILITY: SOCIAL INSECURITY: HAVE YOU HAD ANY THOUGHTS OF HARMING ANYONE ELSE?: NO

## 2025-04-07 SDOH — SOCIAL STABILITY: SOCIAL INSECURITY: ARE YOU OR HAVE YOU BEEN THREATENED OR ABUSED PHYSICALLY, EMOTIONALLY, OR SEXUALLY BY ANYONE?: NO

## 2025-04-07 SDOH — SOCIAL STABILITY: SOCIAL INSECURITY: WITHIN THE LAST YEAR, HAVE YOU BEEN HUMILIATED OR EMOTIONALLY ABUSED IN OTHER WAYS BY YOUR PARTNER OR EX-PARTNER?: NO

## 2025-04-07 SDOH — SOCIAL STABILITY: SOCIAL INSECURITY: ABUSE: ADULT

## 2025-04-07 SDOH — ECONOMIC STABILITY: FOOD INSECURITY: WITHIN THE PAST 12 MONTHS, YOU WORRIED THAT YOUR FOOD WOULD RUN OUT BEFORE YOU GOT THE MONEY TO BUY MORE.: NEVER TRUE

## 2025-04-07 SDOH — ECONOMIC STABILITY: INCOME INSECURITY: IN THE PAST 12 MONTHS HAS THE ELECTRIC, GAS, OIL, OR WATER COMPANY THREATENED TO SHUT OFF SERVICES IN YOUR HOME?: NO

## 2025-04-07 SDOH — SOCIAL STABILITY: SOCIAL INSECURITY: HAVE YOU HAD THOUGHTS OF HARMING ANYONE ELSE?: NO

## 2025-04-07 SDOH — SOCIAL STABILITY: SOCIAL INSECURITY: DO YOU FEEL ANYONE HAS EXPLOITED OR TAKEN ADVANTAGE OF YOU FINANCIALLY OR OF YOUR PERSONAL PROPERTY?: NO

## 2025-04-07 SDOH — SOCIAL STABILITY: SOCIAL INSECURITY: WERE YOU ABLE TO COMPLETE ALL THE BEHAVIORAL HEALTH SCREENINGS?: YES

## 2025-04-07 SDOH — ECONOMIC STABILITY: FOOD INSECURITY: WITHIN THE PAST 12 MONTHS, THE FOOD YOU BOUGHT JUST DIDN'T LAST AND YOU DIDN'T HAVE MONEY TO GET MORE.: NEVER TRUE

## 2025-04-07 SDOH — SOCIAL STABILITY: SOCIAL INSECURITY: DOES ANYONE TRY TO KEEP YOU FROM HAVING/CONTACTING OTHER FRIENDS OR DOING THINGS OUTSIDE YOUR HOME?: NO

## 2025-04-07 SDOH — SOCIAL STABILITY: SOCIAL INSECURITY: DO YOU FEEL UNSAFE GOING BACK TO THE PLACE WHERE YOU ARE LIVING?: NO

## 2025-04-07 SDOH — SOCIAL STABILITY: SOCIAL INSECURITY: HAS ANYONE EVER THREATENED TO HURT YOUR FAMILY OR YOUR PETS?: NO

## 2025-04-07 SDOH — SOCIAL STABILITY: SOCIAL INSECURITY: ARE THERE ANY APPARENT SIGNS OF INJURIES/BEHAVIORS THAT COULD BE RELATED TO ABUSE/NEGLECT?: NO

## 2025-04-07 ASSESSMENT — ACTIVITIES OF DAILY LIVING (ADL)
PATIENT'S MEMORY ADEQUATE TO SAFELY COMPLETE DAILY ACTIVITIES?: YES
WALKS IN HOME: NEEDS ASSISTANCE
ASSISTIVE_DEVICE: WALKER;EYEGLASSES
HEARING - RIGHT EAR: FUNCTIONAL
FEEDING YOURSELF: INDEPENDENT
TOILETING: NEEDS ASSISTANCE
ADEQUATE_TO_COMPLETE_ADL: YES
HEARING - LEFT EAR: FUNCTIONAL
LACK_OF_TRANSPORTATION: NO
JUDGMENT_ADEQUATE_SAFELY_COMPLETE_DAILY_ACTIVITIES: YES
BATHING: NEEDS ASSISTANCE
GROOMING: INDEPENDENT
DRESSING YOURSELF: NEEDS ASSISTANCE

## 2025-04-07 ASSESSMENT — PAIN SCALES - GENERAL
PAINLEVEL_OUTOF10: 0 - NO PAIN
PAINLEVEL_OUTOF10: 3

## 2025-04-07 ASSESSMENT — COGNITIVE AND FUNCTIONAL STATUS - GENERAL
HELP NEEDED FOR BATHING: A LITTLE
TOILETING: A LITTLE
DAILY ACTIVITIY SCORE: 20
DRESSING REGULAR LOWER BODY CLOTHING: A LITTLE
MOBILITY SCORE: 20
STANDING UP FROM CHAIR USING ARMS: A LITTLE
DRESSING REGULAR UPPER BODY CLOTHING: A LITTLE
CLIMB 3 TO 5 STEPS WITH RAILING: A LITTLE
MOVING TO AND FROM BED TO CHAIR: A LITTLE
WALKING IN HOSPITAL ROOM: A LITTLE
PATIENT BASELINE BEDBOUND: NO

## 2025-04-07 ASSESSMENT — PAIN - FUNCTIONAL ASSESSMENT
PAIN_FUNCTIONAL_ASSESSMENT: 0-10
PAIN_FUNCTIONAL_ASSESSMENT: 0-10

## 2025-04-07 ASSESSMENT — LIFESTYLE VARIABLES
SKIP TO QUESTIONS 9-10: 1
HOW OFTEN DO YOU HAVE 6 OR MORE DRINKS ON ONE OCCASION: NEVER
AUDIT-C TOTAL SCORE: 0
AUDIT-C TOTAL SCORE: 0
HOW MANY STANDARD DRINKS CONTAINING ALCOHOL DO YOU HAVE ON A TYPICAL DAY: PATIENT DOES NOT DRINK
HOW OFTEN DO YOU HAVE A DRINK CONTAINING ALCOHOL: NEVER

## 2025-04-07 NOTE — ED PROVIDER NOTES
"HPI   Chief Complaint   Patient presents with    Syncope     Pt with a syncopal episode after dialysis today.  Pt states that she got home around 2 and started feeling off.  She states that she noticed that her O2 tubing was caught under he chair.  Pt then began feeling nauseated and called for help.  Pt states she remembers passing out and EMS states that she did not fall.        79-year-old female presenting for syncope.  Patient states she went to dialysis today.  She got home and subsequently felt nauseated and dizzy.  She felt like she was going to pass out so she called for help.  By the time EMS got there she had syncopized.  She has no pain anywhere however it is unclear if she hit her head or not.  She states that she is just \"uncomfortable\".  She states that she is also thirsty.  She states she is only allowed to have 5 cups of water daily due to being on dialysis.              Patient History   Past Medical History:   Diagnosis Date    Body mass index (BMI) 31.0-31.9, adult 04/12/2019    BMI 31.0-31.9,adult    Encounter for follow-up examination after completed treatment for conditions other than malignant neoplasm 11/30/2017    Hospital discharge follow-up    Other conditions influencing health status 03/12/2019    History of cough    Other specified disorders of bone, unspecified site 03/11/2015    Abnormal bone formation    Personal history of malignant neoplasm of breast 02/05/2014    Personal history of breast cancer    Personal history of malignant neoplasm, unspecified     History of malignant neoplasm    Personal history of other diseases of the circulatory system     History of hypertension    Personal history of other diseases of the digestive system     History of gastroesophageal reflux (GERD)    Personal history of other diseases of the musculoskeletal system and connective tissue 01/21/2016    History of trigger finger    Personal history of other diseases of the musculoskeletal system and " connective tissue     History of arthritis    Personal history of other diseases of the nervous system and sense organs     History of sleep apnea    Personal history of other diseases of urinary system     History of kidney disease    Respiratory failure, unspecified with hypoxia (Multi) 12/29/2017    Respiratory failure with hypoxia    Shortness of breath 10/06/2017    Exertional shortness of breath    Unspecified cataract     Cataracts, bilateral    Unspecified fracture of unspecified forearm, sequela 10/04/2018    Forearm fracture, sequela     Past Surgical History:   Procedure Laterality Date    COLONOSCOPY  02/05/2014    Colonoscopy (Fiberoptic)    IR CVC TUNNELED  10/12/2021    IR CVC TUNNELED 10/12/2021 [facility]ICAL [facility]GAC[facility]    KNEE ARTHROSCOPY W/ MENISCAL REPAIR  04/01/2014    Knee Arthroscopy With Medial Meniscus Repair    MASTECTOMY  02/05/2014    Breast Surgery Mastectomy    MR HEAD ANGIO WO IV CONTRAST  2/23/2019    MR HEAD ANGIO WO IV CONTRAST 2/23/2019 [facility]    MR NECK ANGIO WO IV CONTRAST  2/23/2019    MR NECK ANGIO WO IV CONTRAST 2/23/2019 [facility]    OTHER SURGICAL HISTORY  09/21/2021    Arteriovenous fistula creation procedure    ROTATOR CUFF REPAIR  04/01/2014    Rotator Cuff Repair    TONSILLECTOMY  02/05/2014    Tonsillectomy     Family History   Problem Relation Name Age of Onset    Hypertension Mother      Leukemia Mother      Osteoarthritis Mother      Colon cancer Father      Diabetes Father      Hypertension Father      Osteoarthritis Sister      Osteoarthritis Other Aunt     Stroke Other Aunt     Stroke Other Uncle     Breast cancer Other Family History      Social History     Tobacco Use    Smoking status: Never    Smokeless tobacco: Never   Vaping Use    Vaping status: Never Used   Substance Use Topics    Alcohol use: Never    Drug use: Never       Physical Exam   ED Triage Vitals [04/07/25 1746]   Temperature Heart Rate Respirations BP   36.4 °C (97.5 °F) 75  18 (!) 188/64      Pulse Ox Temp src Heart Rate Source Patient Position   100 % -- -- --      BP Location FiO2 (%)     -- --       Physical Exam  Constitutional:       General: She is not in acute distress.     Appearance: She is ill-appearing.   HENT:      Head: Normocephalic and atraumatic.      Nose: Nose normal.      Mouth/Throat:      Mouth: Mucous membranes are dry.   Eyes:      Extraocular Movements: Extraocular movements intact.      Pupils: Pupils are equal, round, and reactive to light.   Cardiovascular:      Rate and Rhythm: Normal rate and regular rhythm.      Pulses: Normal pulses.      Heart sounds: Normal heart sounds. No murmur heard.     No friction rub. No gallop.   Pulmonary:      Effort: Pulmonary effort is normal. No respiratory distress.      Breath sounds: Normal breath sounds. No wheezing, rhonchi or rales.   Chest:      Chest wall: No tenderness.   Abdominal:      General: There is no distension.      Palpations: Abdomen is soft.      Tenderness: There is no abdominal tenderness. There is no guarding.   Musculoskeletal:         General: No swelling, tenderness, deformity or signs of injury. Normal range of motion.      Cervical back: Normal range of motion. No tenderness.   Skin:     General: Skin is warm and dry.      Capillary Refill: Capillary refill takes less than 2 seconds.   Neurological:      General: No focal deficit present.      Mental Status: She is alert and oriented to person, place, and time.      Motor: No weakness.   Psychiatric:         Mood and Affect: Mood normal.         Behavior: Behavior normal.           ED Course & MDM   Diagnoses as of 04/07/25 1915   Syncope and collapse                 No data recorded     [place] Coma Scale Score: 15 (04/07/25 1758 : Yuri Lamas RN)                           Medical Decision Making  79-year-old female presenting after syncope.  Patient states she feels she is dehydrated.  We will obtain CT head as well as EKG, labs.  She has no  ongoing chest pain or abdominal pain.        Procedure  Procedures     Wyatt Mendez PA-C  04/07/25 1915

## 2025-04-08 ENCOUNTER — APPOINTMENT (OUTPATIENT)
Dept: RADIOLOGY | Facility: HOSPITAL | Age: 80
End: 2025-04-08
Payer: MEDICARE

## 2025-04-08 ENCOUNTER — APPOINTMENT (OUTPATIENT)
Dept: CARDIOLOGY | Facility: HOSPITAL | Age: 80
End: 2025-04-08
Payer: MEDICARE

## 2025-04-08 DIAGNOSIS — R55 SYNCOPE, UNSPECIFIED SYNCOPE TYPE: Primary | ICD-10-CM

## 2025-04-08 LAB
ALBUMIN SERPL BCP-MCNC: 3.6 G/DL (ref 3.4–5)
ANION GAP SERPL CALC-SCNC: 14 MMOL/L (ref 10–20)
AORTIC VALVE MEAN GRADIENT: 6 MMHG
AORTIC VALVE PEAK VELOCITY: 1.59 M/S
APPEARANCE UR: CLEAR
ATRIAL RATE: 71 BPM
AV PEAK GRADIENT: 10 MMHG
AVA (PEAK VEL): 1.29 CM2
AVA (VTI): 0.98 CM2
BILIRUB UR STRIP.AUTO-MCNC: NEGATIVE MG/DL
BUN SERPL-MCNC: 35 MG/DL (ref 6–23)
CALCIUM SERPL-MCNC: 8.6 MG/DL (ref 8.6–10.3)
CARDIAC TROPONIN I PNL SERPL HS: 48 NG/L (ref 0–13)
CHLORIDE SERPL-SCNC: 101 MMOL/L (ref 98–107)
CO2 SERPL-SCNC: 27 MMOL/L (ref 21–32)
COLOR UR: COLORLESS
CREAT SERPL-MCNC: 4.66 MG/DL (ref 0.5–1.05)
EGFRCR SERPLBLD CKD-EPI 2021: 9 ML/MIN/1.73M*2
EJECTION FRACTION APICAL 4 CHAMBER: 72.2
EJECTION FRACTION: 68 %
ERYTHROCYTE [DISTWIDTH] IN BLOOD BY AUTOMATED COUNT: 16.4 % (ref 11.5–14.5)
GLUCOSE SERPL-MCNC: 92 MG/DL (ref 74–99)
GLUCOSE UR STRIP.AUTO-MCNC: ABNORMAL MG/DL
HCT VFR BLD AUTO: 35.1 % (ref 36–46)
HGB BLD-MCNC: 11.8 G/DL (ref 12–16)
HOLD SPECIMEN: NORMAL
KETONES UR STRIP.AUTO-MCNC: NEGATIVE MG/DL
LEFT ATRIUM VOLUME AREA LENGTH INDEX BSA: 28.3 ML/M2
LEFT VENTRICLE INTERNAL DIMENSION DIASTOLE: 5.07 CM (ref 3.5–6)
LEFT VENTRICULAR OUTFLOW TRACT DIAMETER: 1.98 CM
LEUKOCYTE ESTERASE UR QL STRIP.AUTO: NEGATIVE
MAGNESIUM SERPL-MCNC: 1.99 MG/DL (ref 1.6–2.4)
MCH RBC QN AUTO: 34.2 PG (ref 26–34)
MCHC RBC AUTO-ENTMCNC: 33.6 G/DL (ref 32–36)
MCV RBC AUTO: 102 FL (ref 80–100)
NITRITE UR QL STRIP.AUTO: NEGATIVE
NRBC BLD-RTO: 0 /100 WBCS (ref 0–0)
P AXIS: 44 DEGREES
P OFFSET: 193 MS
P ONSET: 136 MS
PH UR STRIP.AUTO: 8 [PH]
PHOSPHATE SERPL-MCNC: 4.5 MG/DL (ref 2.5–4.9)
PLATELET # BLD AUTO: 227 X10*3/UL (ref 150–450)
POTASSIUM SERPL-SCNC: 4.5 MMOL/L (ref 3.5–5.3)
PR INTERVAL: 168 MS
PROT UR STRIP.AUTO-MCNC: ABNORMAL MG/DL
Q ONSET: 220 MS
QRS COUNT: 12 BEATS
QRS DURATION: 92 MS
QT INTERVAL: 436 MS
QTC CALCULATION(BAZETT): 473 MS
QTC FREDERICIA: 461 MS
R AXIS: 27 DEGREES
RBC # BLD AUTO: 3.45 X10*6/UL (ref 4–5.2)
RBC # UR STRIP.AUTO: NEGATIVE MG/DL
RBC #/AREA URNS AUTO: NORMAL /HPF
RIGHT VENTRICLE FREE WALL PEAK S': 8 CM/S
RIGHT VENTRICLE PEAK SYSTOLIC PRESSURE: 56 MMHG
SODIUM SERPL-SCNC: 137 MMOL/L (ref 136–145)
SP GR UR STRIP.AUTO: 1.01
SQUAMOUS #/AREA URNS AUTO: NORMAL /HPF
T AXIS: 44 DEGREES
T OFFSET: 438 MS
TRICUSPID ANNULAR PLANE SYSTOLIC EXCURSION: 2 CM
UROBILINOGEN UR STRIP.AUTO-MCNC: NORMAL MG/DL
VENTRICULAR RATE: 71 BPM
WBC # BLD AUTO: 6.8 X10*3/UL (ref 4.4–11.3)
WBC #/AREA URNS AUTO: NORMAL /HPF

## 2025-04-08 PROCEDURE — 2500000004 HC RX 250 GENERAL PHARMACY W/ HCPCS (ALT 636 FOR OP/ED): Performed by: PHYSICIAN ASSISTANT

## 2025-04-08 PROCEDURE — 96372 THER/PROPH/DIAG INJ SC/IM: CPT | Mod: 59 | Performed by: PHYSICIAN ASSISTANT

## 2025-04-08 PROCEDURE — 83735 ASSAY OF MAGNESIUM: CPT | Performed by: PHYSICIAN ASSISTANT

## 2025-04-08 PROCEDURE — 99233 SBSQ HOSP IP/OBS HIGH 50: CPT | Performed by: PHYSICIAN ASSISTANT

## 2025-04-08 PROCEDURE — 2500000001 HC RX 250 WO HCPCS SELF ADMINISTERED DRUGS (ALT 637 FOR MEDICARE OP): Performed by: NURSE PRACTITIONER

## 2025-04-08 PROCEDURE — G0378 HOSPITAL OBSERVATION PER HR: HCPCS

## 2025-04-08 PROCEDURE — 2500000001 HC RX 250 WO HCPCS SELF ADMINISTERED DRUGS (ALT 637 FOR MEDICARE OP): Performed by: PHYSICIAN ASSISTANT

## 2025-04-08 PROCEDURE — 78830 RP LOCLZJ TUM SPECT W/CT 1: CPT | Performed by: RADIOLOGY

## 2025-04-08 PROCEDURE — 81001 URINALYSIS AUTO W/SCOPE: CPT | Performed by: EMERGENCY MEDICINE

## 2025-04-08 PROCEDURE — 93306 TTE W/DOPPLER COMPLETE: CPT | Performed by: STUDENT IN AN ORGANIZED HEALTH CARE EDUCATION/TRAINING PROGRAM

## 2025-04-08 PROCEDURE — A9540 TC99M MAA: HCPCS | Performed by: PHYSICIAN ASSISTANT

## 2025-04-08 PROCEDURE — 85027 COMPLETE CBC AUTOMATED: CPT | Performed by: PHYSICIAN ASSISTANT

## 2025-04-08 PROCEDURE — C8929 TTE W OR WO FOL WCON,DOPPLER: HCPCS

## 2025-04-08 PROCEDURE — 80069 RENAL FUNCTION PANEL: CPT | Performed by: PHYSICIAN ASSISTANT

## 2025-04-08 PROCEDURE — 3430000001 HC RX 343 DIAGNOSTIC RADIOPHARMACEUTICALS: Performed by: PHYSICIAN ASSISTANT

## 2025-04-08 PROCEDURE — 36415 COLL VENOUS BLD VENIPUNCTURE: CPT | Performed by: INTERNAL MEDICINE

## 2025-04-08 PROCEDURE — 2500000005 HC RX 250 GENERAL PHARMACY W/O HCPCS: Performed by: INTERNAL MEDICINE

## 2025-04-08 PROCEDURE — 78830 RP LOCLZJ TUM SPECT W/CT 1: CPT

## 2025-04-08 PROCEDURE — 84484 ASSAY OF TROPONIN QUANT: CPT | Performed by: INTERNAL MEDICINE

## 2025-04-08 PROCEDURE — 96375 TX/PRO/DX INJ NEW DRUG ADDON: CPT | Mod: 59

## 2025-04-08 PROCEDURE — 99222 1ST HOSP IP/OBS MODERATE 55: CPT | Performed by: STUDENT IN AN ORGANIZED HEALTH CARE EDUCATION/TRAINING PROGRAM

## 2025-04-08 PROCEDURE — 2500000001 HC RX 250 WO HCPCS SELF ADMINISTERED DRUGS (ALT 637 FOR MEDICARE OP): Performed by: INTERNAL MEDICINE

## 2025-04-08 PROCEDURE — 36415 COLL VENOUS BLD VENIPUNCTURE: CPT | Performed by: PHYSICIAN ASSISTANT

## 2025-04-08 RX ORDER — HEPARIN SODIUM 5000 [USP'U]/ML
5000 INJECTION, SOLUTION INTRAVENOUS; SUBCUTANEOUS EVERY 8 HOURS
Status: DISCONTINUED | OUTPATIENT
Start: 2025-04-08 | End: 2025-04-09 | Stop reason: HOSPADM

## 2025-04-08 RX ORDER — BENZONATATE 100 MG/1
200 CAPSULE ORAL 3 TIMES DAILY PRN
Status: DISCONTINUED | OUTPATIENT
Start: 2025-04-08 | End: 2025-04-09 | Stop reason: HOSPADM

## 2025-04-08 RX ADMIN — PERFLUTREN 1 ML OF DILUTION: 6.52 INJECTION, SUSPENSION INTRAVENOUS at 10:55

## 2025-04-08 RX ADMIN — CARVEDILOL 25 MG: 25 TABLET, FILM COATED ORAL at 20:30

## 2025-04-08 RX ADMIN — ACETAMINOPHEN 650 MG: 325 TABLET ORAL at 10:09

## 2025-04-08 RX ADMIN — Medication 2000 UNITS: at 06:08

## 2025-04-08 RX ADMIN — Medication 2 L/MIN: at 20:45

## 2025-04-08 RX ADMIN — ATORVASTATIN CALCIUM 20 MG: 20 TABLET, FILM COATED ORAL at 20:30

## 2025-04-08 RX ADMIN — Medication 1 CAPSULE: at 09:24

## 2025-04-08 RX ADMIN — Medication 1 CAPSULE: at 08:25

## 2025-04-08 RX ADMIN — HEPARIN SODIUM 5000 UNITS: 5000 INJECTION INTRAVENOUS; SUBCUTANEOUS at 12:24

## 2025-04-08 RX ADMIN — AMITRIPTYLINE HYDROCHLORIDE 10 MG: 10 TABLET, FILM COATED ORAL at 20:29

## 2025-04-08 RX ADMIN — CARVEDILOL 25 MG: 25 TABLET, FILM COATED ORAL at 08:25

## 2025-04-08 RX ADMIN — Medication 10 MG: at 20:29

## 2025-04-08 RX ADMIN — HEPARIN SODIUM 5000 UNITS: 5000 INJECTION INTRAVENOUS; SUBCUTANEOUS at 20:29

## 2025-04-08 RX ADMIN — LOSARTAN POTASSIUM 100 MG: 50 TABLET, FILM COATED ORAL at 08:25

## 2025-04-08 RX ADMIN — KIT FOR THE PREPARATION OF TECHNETIUM TC 99M ALBUMIN AGGREGATED 4.1 MILLICURIE: 2.5 INJECTION, POWDER, FOR SOLUTION INTRAVENOUS at 13:05

## 2025-04-08 ASSESSMENT — COGNITIVE AND FUNCTIONAL STATUS - GENERAL
TOILETING: A LITTLE
HELP NEEDED FOR BATHING: A LITTLE
STANDING UP FROM CHAIR USING ARMS: A LITTLE
DAILY ACTIVITIY SCORE: 19
WALKING IN HOSPITAL ROOM: A LITTLE
DAILY ACTIVITIY SCORE: 19
MOBILITY SCORE: 20
MOVING TO AND FROM BED TO CHAIR: A LITTLE
DRESSING REGULAR LOWER BODY CLOTHING: A LITTLE
DRESSING REGULAR LOWER BODY CLOTHING: A LITTLE
PERSONAL GROOMING: A LITTLE
DRESSING REGULAR UPPER BODY CLOTHING: A LITTLE
MOBILITY SCORE: 20
CLIMB 3 TO 5 STEPS WITH RAILING: A LITTLE
DRESSING REGULAR UPPER BODY CLOTHING: A LITTLE
TOILETING: A LITTLE
PERSONAL GROOMING: A LITTLE
WALKING IN HOSPITAL ROOM: A LITTLE
HELP NEEDED FOR BATHING: A LITTLE
STANDING UP FROM CHAIR USING ARMS: A LITTLE
CLIMB 3 TO 5 STEPS WITH RAILING: A LITTLE
MOVING TO AND FROM BED TO CHAIR: A LITTLE

## 2025-04-08 ASSESSMENT — ENCOUNTER SYMPTOMS
ENDOCRINE NEGATIVE: 1
ACTIVITY CHANGE: 1
SHORTNESS OF BREATH: 1
RESPIRATORY NEGATIVE: 1
PSYCHIATRIC NEGATIVE: 1
FATIGUE: 1
EYES NEGATIVE: 1
ALLERGIC/IMMUNOLOGIC NEGATIVE: 1
LIGHT-HEADEDNESS: 1
GASTROINTESTINAL NEGATIVE: 1
MUSCULOSKELETAL NEGATIVE: 1
HEMATOLOGIC/LYMPHATIC NEGATIVE: 1
NEUROLOGICAL NEGATIVE: 1

## 2025-04-08 ASSESSMENT — PAIN SCALES - GENERAL
PAINLEVEL_OUTOF10: 0 - NO PAIN
PAINLEVEL_OUTOF10: 3
PAINLEVEL_OUTOF10: 0 - NO PAIN

## 2025-04-08 ASSESSMENT — PAIN DESCRIPTION - LOCATION: LOCATION: GENERALIZED

## 2025-04-08 ASSESSMENT — ACTIVITIES OF DAILY LIVING (ADL): LACK_OF_TRANSPORTATION: NO

## 2025-04-08 ASSESSMENT — PAIN - FUNCTIONAL ASSESSMENT: PAIN_FUNCTIONAL_ASSESSMENT: 0-10

## 2025-04-08 NOTE — PROGRESS NOTES
Joyce Bethea is a 79 y.o. female on day 0 of admission presenting with Syncope and collapse.      Subjective   Feeling ok this morning. She is frustrated with how deconditioned she and how much oxygen she has needed over the past few months with recent illness. She thinks maybe she got out of breath yesterday. Also endorses increased fluid draw at HD yesterday. Denies CP, ate breakfast.       Objective     Last Recorded Vitals  /70 (BP Location: Left arm)   Pulse 75   Temp 36.6 °C (97.9 °F) (Temporal)   Resp 17   Wt 75.1 kg (165 lb 9.1 oz)   SpO2 96%   Intake/Output last 3 Shifts:    Intake/Output Summary (Last 24 hours) at 4/8/2025 1056  Last data filed at 4/8/2025 0929  Gross per 24 hour   Intake 120 ml   Output 25 ml   Net 95 ml       Admission Weight  Weight: 75.1 kg (165 lb 9.1 oz) (04/07/25 2211)    Daily Weight  04/07/25 : 75.1 kg (165 lb 9.1 oz)    Image Results  ECG 12 lead  Normal sinus rhythm  Minimal voltage criteria for LVH, may be normal variant ( Jace product )  Borderline ECG  When compared with ECG of 07-FEB-2025 18:26,  No significant change was found  See ED provider note for full interpretation and clinical correlation  Confirmed by Lucero Metcalf ([id number]) on 4/8/2025 10:27:23 AM      Physical Exam  Physical Exam  Gen: NAD  Eyes:  EOM intact  ENT: MM dry  Neck: No JVD  Respiratory: CTAB, no wheezes/rhonchi, on chronic 2 L oxygen  Cardiac: RRR, +SM  Abdomen: soft, NT, +BS  Extremities: no edema or cyanosis  Neuro: No focal deficits, alert and oriented x 3  Psych:  appropriate mood and behavior      Assessment/Plan      Syncope and collapse  - likely multifactorial: orthostasis, possible low oxygen level, deconditioning  - continue telemetry  - orthostatic vitals positive last night, repeat today  - Cardiology recs appreciated, echo today, monitor on tele  - check UA if able to produce sample     Borderline elevated troponin levels  - Most likely related to ESRD  - Cardiology follow  up    Hypertension/Labile BP  -losartan and carvedilol  -amlodipine on hold this morning 2/2 hypotension  -consultants following  -continue to monitor     ESRD on HD  - Pt is on the Monday, Wednesday and Friday schedule  - Nephrology consulted, unable to see patient as she was at echo but plans HD tomorrow per regular schedule     DVT prophy  -heparin subcutaneous    Dispo: monitor on tele, check echo, likely will stay another night pending progress  D/w HARI CervantesC

## 2025-04-08 NOTE — CONSULTS
Inpatient consult to Cardiology  Consult performed by: ANASTASIA Breaux-CNP  Consult ordered by: Sherry Alexander MD        History Of Present Illness:    Joyce Bethea is a 79 y.o. 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 Patient had HD early in the day where they removed 2.3 L (typically take of 1.3-1.5 L).  She got back to the SNF and felt weak and more SOB with ambulation.  She says that she felt lightheaded while walking and the next thing she remembers is being in a chair and the nurses telling her that she had passed out.     Initial /64. Labs significant for creatinine 3.21, Hgb 11.8, troponin 30, 40, 48. EKG showed NSR HR 71 bpm LVH. CT head negative. Home medications include amlodipine 5 mg daily, atorvastatin 20 mg daily, carvedilol 25 mg BID, losartan 100 mg daily.    Review of Systems   Constitutional:  Positive for fatigue.   Respiratory:  Positive for shortness of breath.    Neurological:  Positive for light-headedness.   All other systems reviewed and are negative.    Last Recorded Vitals:  Vitals:    04/08/25 0000 04/08/25 0035 04/08/25 0336 04/08/25 0723   BP: 167/64 166/66 96/63 169/70   BP Location: Left arm  Left arm Left arm   Pulse: 76 71 70 75   Resp: 17  15 17   Temp: 36.5 °C (97.7 °F)  36.5 °C (97.7 °F) 36.6 °C (97.9 °F)   TempSrc: Temporal  Temporal Temporal   SpO2: 96% 97% 95% 96%   Weight:       Height:           Last Labs:  CBC - 4/8/2025:  8:14 AM  6.8 11.8 227    35.1      CMP - 4/7/2025:  6:33 PM  8.4 6.3 30 --- 0.5   5.2 3.8 31 179      PTT - 1/22/2025:  4:31 AM  1.0   10.9 30     Troponin I, High Sensitivity   Date/Time Value Ref Range Status   04/08/2025 06:05 AM 48 (H) 0 - 13 ng/L Final   04/07/2025 07:50 PM 40 (H) 0 - 13 ng/L Final   04/07/2025 06:33  "PM 30 (H) 0 - 13 ng/L Final     BNP   Date/Time Value Ref Range Status   02/07/2025 05:47  (H) 0 - 99 pg/mL Final   01/23/2025 06:07  (H) 0 - 99 pg/mL Final     Hemoglobin A1C   Date/Time Value Ref Range Status   10/30/2024 09:54 AM 5.5 See comment % Final     LDL Calculated   Date/Time Value Ref Range Status   10/30/2024 10:39 AM 66 <=99 mg/dL Final     Comment:                                 Near   Borderline      AGE      Desirable  Optimal    High     High     Very High     0-19 Y     0 - 109     ---    110-129   >/= 130     ----    20-24 Y     0 - 119     ---    120-159   >/= 160     ----      >24 Y     0 -  99   100-129  130-159   160-189     >/=190       VLDL   Date/Time Value Ref Range Status   10/30/2024 10:39 AM 18 0 - 40 mg/dL Final   10/03/2021 06:31 AM 18 0 - 40 mg/dL Final   06/25/2019 08:00 AM 21 0 - 40 mg/dL Final   08/08/2018 08:00 AM 29 0 - 40 mg/dL Final      Last I/O:  I/O last 3 completed shifts:  In: 120 (1.6 mL/kg) [P.O.:120]  Out: - (0 mL/kg)   Weight: 75.1 kg     Past Cardiology Tests (Last 3 Years):  EKG:  ECG 12 lead 04/07/2025 (Preliminary)  NSR HR 71 bpm LVH    Echo:  6/29/2023  CONCLUSIONS:  1. Left ventricular systolic function is normal with a 55-60% estimated ejection fraction.  2. Spectral Doppler shows an impaired relaxation pattern of left ventricular diastolic filling.  3. The left atrium is moderately dilated.  4. Mild to moderate mitral valve regurgitation.  5. Mild aortic valve regurgitation.  6. There is mild tricuspid regurgitation.  7. The estimated pulmonary artery pressure is mild-moderately elevated with the RVSP at 49.5 mmHg.    Ejection Fractions:  No results found for: \"EF\"  Cath:  No results found for this or any previous visit from the past 1095 days.    Stress Test:  No results found for this or any previous visit from the past 1095 days.    Cardiac Imaging:  No results found for this or any previous visit from the past 1095 days.    Past Medical " History:  She has a past medical history of Body mass index (BMI) 31.0-31.9, adult (04/12/2019), Encounter for follow-up examination after completed treatment for conditions other than malignant neoplasm (11/30/2017), Other conditions influencing health status (03/12/2019), Other specified disorders of bone, unspecified site (03/11/2015), Personal history of malignant neoplasm of breast (02/05/2014), Personal history of malignant neoplasm, unspecified, Personal history of other diseases of the circulatory system, Personal history of other diseases of the digestive system, Personal history of other diseases of the musculoskeletal system and connective tissue (01/21/2016), Personal history of other diseases of the musculoskeletal system and connective tissue, Personal history of other diseases of the nervous system and sense organs, Personal history of other diseases of urinary system, Respiratory failure, unspecified with hypoxia (12/29/2017), Shortness of breath (10/06/2017), Unspecified cataract, and Unspecified fracture of unspecified forearm, sequela (10/04/2018).    Past Surgical History:  She has a past surgical history that includes Colonoscopy (02/05/2014); Tonsillectomy (02/05/2014); Mastectomy (02/05/2014); Rotator cuff repair (04/01/2014); Knee arthroscopy w/ meniscal repair (04/01/2014); Other surgical history (09/21/2021); MR angio head wo IV contrast (2/23/2019); MR angio neck wo IV contrast (2/23/2019); and IR CVC tunneled (10/12/2021).      Social History:  She reports that she has never smoked. She has never used smokeless tobacco. She reports that she does not drink alcohol and does not use drugs.    Family History:  Family History   Problem Relation Name Age of Onset    Hypertension Mother      Leukemia Mother      Osteoarthritis Mother      Colon cancer Father      Diabetes Father      Hypertension Father      Osteoarthritis Sister      Osteoarthritis Other Aunt     Stroke Other Aunt     Stroke  Other Uncle     Breast cancer Other Family History         Allergies:  Bee pollen, House dust, House dust mite, and Adhesive tape-silicones    Inpatient Medications:  Scheduled medications   Medication Dose Route Frequency    amitriptyline  10 mg oral Nightly    [Held by provider] amLODIPine  5 mg oral Daily    atorvastatin  20 mg oral Nightly    [START ON 4/9/2025] calcitriol  0.25 mcg oral Every Mon/Wed/Fri    carvedilol  25 mg oral BID    cholecalciferol  2,000 Units oral Daily    docusate sodium  100 mg oral BID    [START ON 4/9/2025] epoetin palomo-epbx  2,000 Units subcutaneous Every Mon/Wed/Fri    [START ON 4/9/2025] hydroxyurea  500 mg oral Every Mon/Wed/Fri    lactobacillus acidophilus  1 capsule oral Daily    losartan  100 mg oral Daily    pantoprazole  40 mg oral Daily before breakfast    vitamin B complex-vitamin C-folic acid  1 capsule oral Daily     PRN medications   Medication    acetaminophen    benzonatate    calcium carbonate    guaiFENesin    meclizine    melatonin    nitroglycerin    sennosides-docusate sodium    sodium chloride     Continuous Medications   Medication Dose Last Rate     Outpatient Medications:  Current Outpatient Medications   Medication Instructions    acetaminophen (TYLENOL) 650 mg, oral, Every 8 hours PRN, Max 2000mg daily     amitriptyline (ELAVIL) 10 mg, Daily    amLODIPine (NORVASC) 5 mg, oral, Daily    atorvastatin (LIPITOR) 20 mg, oral, Nightly    B complex-vitamin C-folic acid (Nephrocaps) 1 mg capsule 1 capsule, Daily    Bacillus coagulans/inulin (PROBIOTIC FORMULA, INULIN, ORAL) 1 capsule, Daily    benzonatate (TESSALON) 200 mg, oral, 3 times daily PRN, Do not crush or chew.    calcitriol (ROCALTROL) 0.25 mcg, Every Mon/Wed/Fri    calcium carbonate (Tums) 200 mg calcium chewable tablet 3-4 tablets, oral, 3 times daily PRN    carvedilol (Coreg) 25 mg tablet 1 tablet, 2 times daily    cholecalciferol (VITAMIN D-3) 50 mcg, Daily    epoetin palomo (Procrit) 2,000 unit/mL  injection 1 mL, Every Mon/Wed/Fri    guaiFENesin (MUCINEX) 1,200 mg, oral, 2 times daily PRN, Do not crush, chew, or split.    hydroxyurea (HYDREA) 500 mg, oral, Every Mon/Wed/Fri, M, W, F    loratadine (CLARITIN) 10 mg, oral, Daily PRN, In the morning    losartan (COZAAR) 100 mg, Daily    meclizine (ANTIVERT) 12.5 mg, oral, 3 times daily PRN    nitroglycerin (NITROSTAT) 0.4 mg, sublingual, Every 5 min PRN    omeprazole (PRILOSEC) 20 mg, oral, Daily PRN    ondansetron ODT (ZOFRAN-ODT) 4 mg, oral, Every 8 hours PRN    oxygen (O2) gas therapy Oxygen   Refills: 0       Active    sennosides-docusate sodium (Senna-S) 8.6-50 mg tablet 1 tablet, Nightly    sodium chloride (Ocean) 0.65 % nasal spray 2 sprays, Each Nostril, Every 1 hour PRN    trolamine salicylate (Aspercreme) 10 % cream 1 Application, 3 times daily     Physical Exam  Vitals reviewed.   Constitutional:       Appearance: Normal appearance.      Interventions: Nasal cannula in place.   HENT:      Head: Normocephalic and atraumatic.   Neck:      Vascular: No carotid bruit or JVD.   Cardiovascular:      Rate and Rhythm: Normal rate and regular rhythm.      Pulses: Normal pulses.      Heart sounds: Murmur heard.      Systolic murmur is present with a grade of 2/6.   Pulmonary:      Effort: Pulmonary effort is normal. Tachypnea present.      Breath sounds: Normal breath sounds.   Abdominal:      General: Abdomen is flat. Bowel sounds are normal.      Palpations: Abdomen is soft.   Skin:     General: Skin is warm and dry.   Neurological:      General: No focal deficit present.      Mental Status: She is alert and oriented to person, place, and time. Mental status is at baseline.   Psychiatric:         Mood and Affect: Mood normal.         Behavior: Behavior normal.       Assessment/Plan   Assessment  79 y.o. 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 Patient had HD early in the day where they removed 2.3 L (typically take of 1.3-1.5 L).  Initial /64. Labs significant for creatinine 3.21, Hgb 11.8, troponin 30, 40, 48. EKG showed NSR HR 71 bpm LVH. CT head negative.     Syncope  Increasing dyspnea on exertion  ESRD on HD  Chronic respiratory failure on 2L NC continuous  Mild-moderate pulmonary HTN    Plan  -TTE for structural and functional assessment  -Orthostatic VS  -VQ scan per primary to rule out PE  -Telemetry monitoring   -Continue amlodipine 5 mg daily, atorvastatin 20 mg daily, carvedilol 25 mg BID, losartan 100 mg daily  -OP ambulatory ECG monitor and follow up    Peripheral IV 04/07/25 18 G Left Antecubital (Active)   Site Assessment Clean;Dry;Intact 04/07/25 2211   Dressing Type Transparent 04/07/25 2211   Line Status Flushed 04/07/25 2211   Dressing Status Clean;Dry 04/07/25 2211   Number of days: 1       Hemodialysis Arteriovenous Fistula Right Upper arm (Active)   Site Assessment Clean;Dry;Intact 04/08/25 0048   Dressing Status Clean;Dry 04/08/25 0048   Number of days:        Code Status:  Full Code        Jaime Freeman, APRN-CNP

## 2025-04-08 NOTE — H&P
History Of Present Illness  Joyce Bethea is an 79 y.o. 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  Patient says she had hemodialysis earlier today and they took off 2.3 L which is more than what they usually take off.  She got back to the SNF at about 2 PM, and except for being fatigued -- which is typical for her after dialysis, she was okay.  She says she got up at about 4:45 PM to go to the bathroom and felt weak all over.  She says that she felt lightheaded while walking and the next thing she remembers is being in a chair and the nurses telling her that she had passed out.  She denies having any chest pain or palpitations. She says she is chronically SOB with exertion.  EMS were called and she was brought to the ED.  CT of the head did not reveal any acute intracranial process.     Past Medical History  Past Medical History:   Diagnosis Date    Body mass index (BMI) 31.0-31.9, adult 04/12/2019    BMI 31.0-31.9,adult    Encounter for follow-up examination after completed treatment for conditions other than malignant neoplasm 11/30/2017    Hospital discharge follow-up    Other conditions influencing health status 03/12/2019    History of cough    Other specified disorders of bone, unspecified site 03/11/2015    Abnormal bone formation    Personal history of malignant neoplasm of breast 02/05/2014    Personal history of breast cancer    Personal history of malignant neoplasm, unspecified     History of malignant neoplasm    Personal history of other diseases of the circulatory system     History of hypertension    Personal history of other diseases of the digestive system     History of gastroesophageal reflux (GERD)    Personal history of other diseases of the musculoskeletal system and connective tissue 01/21/2016    History of  trigger finger    Personal history of other diseases of the musculoskeletal system and connective tissue     History of arthritis    Personal history of other diseases of the nervous system and sense organs     History of sleep apnea    Personal history of other diseases of urinary system     History of kidney disease    Respiratory failure, unspecified with hypoxia (Multi) 12/29/2017    Respiratory failure with hypoxia    Shortness of breath 10/06/2017    Exertional shortness of breath    Unspecified cataract     Cataracts, bilateral    Unspecified fracture of unspecified forearm, sequela 10/04/2018    Forearm fracture, sequela       Past Surgical History  Past Surgical History:   Procedure Laterality Date    COLONOSCOPY  02/05/2014    Colonoscopy (Fiberoptic)    IR CVC TUNNELED  10/12/2021    IR CVC TUNNELED 10/12/2021 [facility]    KNEE ARTHROSCOPY W/ MENISCAL REPAIR  04/01/2014    Knee Arthroscopy With Medial Meniscus Repair    MASTECTOMY  02/05/2014    Breast Surgery Mastectomy    MR HEAD ANGIO WO IV CONTRAST  2/23/2019    MR HEAD ANGIO WO IV CONTRAST 2/23/2019 [facility]    MR NECK ANGIO WO IV CONTRAST  2/23/2019    MR NECK ANGIO WO IV CONTRAST 2/23/2019 [facility]    OTHER SURGICAL HISTORY  09/21/2021    Arteriovenous fistula creation procedure    ROTATOR CUFF REPAIR  04/01/2014    Rotator Cuff Repair    TONSILLECTOMY  02/05/2014    Tonsillectomy        Social History  She reports that she has never smoked. She has never used smokeless tobacco. She reports that she does not drink alcohol and does not use drugs.    Family History  Family History   Problem Relation Name Age of Onset    Hypertension Mother      Leukemia Mother      Osteoarthritis Mother      Colon cancer Father      Diabetes Father      Hypertension Father      Osteoarthritis Sister      Osteoarthritis Other Aunt     Stroke Other Aunt     Stroke Other Uncle     Breast cancer Other Family History          Allergies  Bee pollen, House dust, House dust mite, and Adhesive tape-silicones    Medications  No current facility-administered medications on file prior to encounter.     Current Outpatient Medications on File Prior to Encounter   Medication Sig Dispense Refill    amitriptyline (Elavil) 10 mg tablet Take 1 tablet (10 mg) by mouth once daily.      amLODIPine (Norvasc) 5 mg tablet Take 1 tablet (5 mg) by mouth once daily.      atorvastatin (Lipitor) 20 mg tablet Take 1 tablet (20 mg) by mouth once daily at bedtime. 90 tablet 1    Bacillus coagulans/inulin (PROBIOTIC FORMULA, INULIN, ORAL) Take 1 capsule by mouth once daily.      calcitriol (Rocaltrol) 0.25 mcg capsule Take 1 capsule (0.25 mcg) by mouth once a day on Monday, Wednesday, and Friday.      carvedilol (Coreg) 25 mg tablet Take 1 tablet (25 mg) by mouth 2 times a day.      cholecalciferol (Vitamin D-3) 50 mcg (2,000 unit) capsule Take 1 capsule (50 mcg) by mouth early in the morning..      epoetin palomo (Procrit) 2,000 unit/mL injection Inject 1 mL (2,000 Units) under the skin once a day on Monday, Wednesday, and Friday.      hydroxyurea (Hydrea) 500 mg capsule Take 1 capsule (500 mg total) by mouth once a day on Monday, Wednesday, and Friday.  M, W, F 90 capsule 1    losartan (Cozaar) 100 mg tablet Take 1 tablet (100 mg) by mouth once daily.      sennosides-docusate sodium (Senna-S) 8.6-50 mg tablet Take 1 tablet by mouth once daily at bedtime.      acetaminophen (Tylenol) 325 mg tablet Take 2 tablets (650 mg) by mouth every 8 hours if needed for mild pain (1 - 3), headaches or fever (temp greater than 38.0 C). Max 2000mg daily      B complex-vitamin C-folic acid (Nephrocaps) 1 mg capsule Take 1 capsule by mouth once daily.      benzonatate (Tessalon) 200 mg capsule Take 1 capsule (200 mg) by mouth 3 times a day as needed for cough. Do not crush or chew.      calcium carbonate (Tums) 200 mg calcium chewable tablet Chew 3-4 tablets (1,500-2,000 mg) 3  times a day as needed for indigestion or heartburn.      guaiFENesin (Mucinex) 600 mg 12 hr tablet Take 2 tablets (1,200 mg) by mouth 2 times a day as needed for cough. Do not crush, chew, or split.      loratadine (Claritin) 10 mg tablet Take 1 tablet (10 mg) by mouth once daily as needed for allergies. In the morning      meclizine (Antivert) 12.5 mg tablet Take 1 tablet (12.5 mg) by mouth 3 times a day as needed for dizziness. 30 tablet 0    nitroglycerin (Nitrostat) 0.4 mg SL tablet Place 1 tablet (0.4 mg) under the tongue every 5 minutes if needed for chest pain.      omeprazole (PriLOSEC) 20 mg DR capsule Take 1 capsule (20 mg) by mouth once daily as needed.      ondansetron ODT (Zofran-ODT) 4 mg disintegrating tablet Dissolve 1 tablet (4 mg) in the mouth every 8 hours if needed for nausea or vomiting. 20 tablet 0    oxygen (O2) gas therapy Oxygen   Refills: 0       Active      [] predniSONE (Deltasone) 20 mg tablet Take 2 tablets (40 mg) by mouth once daily for 5 days. 10 tablet 0    sodium chloride (Ocean) 0.65 % nasal spray Administer 2 sprays into each nostril every 1 hour if needed for congestion.      trolamine salicylate (Aspercreme) 10 % cream Apply 1 Application topically 3 times a day. To upper arms (Patient not taking: Reported on 2025)           Review of Systems   Constitutional:  Positive for activity change.   HENT: Negative.     Eyes: Negative.    Respiratory: Negative.     Cardiovascular:         See HPI   Gastrointestinal: Negative.    Endocrine: Negative.    Genitourinary:         Has ESRD on HD   Musculoskeletal: Negative.    Skin: Negative.    Allergic/Immunologic: Negative.    Neurological: Negative.    Hematological: Negative.    Psychiatric/Behavioral: Negative.          Physical Exam  Constitutional:       General: She is not in acute distress.     Appearance: She is obese. She is not ill-appearing, toxic-appearing or diaphoretic.   HENT:      Head: Normocephalic and  atraumatic.      Right Ear: External ear normal.      Left Ear: External ear normal.      Nose: Nose normal.      Mouth/Throat:      Mouth: Mucous membranes are dry.      Pharynx: Oropharynx is clear. No oropharyngeal exudate or posterior oropharyngeal erythema.   Eyes:      General: No scleral icterus.        Right eye: No discharge.         Left eye: No discharge.      Conjunctiva/sclera: Conjunctivae normal.   Cardiovascular:      Rate and Rhythm: Normal rate and regular rhythm.      Heart sounds: Murmur heard.      Comments: Soft systolic murmur  Pulmonary:      Breath sounds: No wheezing, rhonchi or rales.   Abdominal:      Palpations: Abdomen is soft. There is no mass.      Tenderness: There is no abdominal tenderness. There is no right CVA tenderness, left CVA tenderness, guarding or rebound.   Musculoskeletal:      Cervical back: Neck supple.      Right lower leg: No edema.      Left lower leg: No edema.   Lymphadenopathy:      Cervical: No cervical adenopathy.   Skin:     General: Skin is warm and dry.   Neurological:      General: No focal deficit present.      Mental Status: She is alert and oriented to person, place, and time.   Psychiatric:         Mood and Affect: Mood normal.         Behavior: Behavior normal.          Last Recorded Vitals  Blood pressure 176/64, pulse 76, temperature 36.6 °C (97.9 °F), temperature source Temporal, resp. rate 17, SpO2 100%.    Relevant Results   Latest Reference Range & Units 04/07/25 18:33 04/07/25 19:50   GLUCOSE 74 - 99 mg/dL 175 (H)    SODIUM 136 - 145 mmol/L 134 (L)    POTASSIUM 3.5 - 5.3 mmol/L 3.7    CHLORIDE 98 - 107 mmol/L 99    Bicarbonate 21 - 32 mmol/L 25    Anion Gap 10 - 20 mmol/L 14    Blood Urea Nitrogen 6 - 23 mg/dL 22    Creatinine 0.50 - 1.05 mg/dL 3.21 (H)    EGFR >60 mL/min/1.73m*2 14 (L)    Calcium 8.6 - 10.3 mg/dL 8.4 (L)    Albumin 3.4 - 5.0 g/dL 3.8    Alkaline Phosphatase 33 - 136 U/L 179 (H)    ALT 7 - 45 U/L 31    AST 9 - 39 U/L 30     Bilirubin Total 0.0 - 1.2 mg/dL 0.5    Total Protein 6.4 - 8.2 g/dL 6.3 (L)    MAGNESIUM 1.60 - 2.40 mg/dL 1.79    Troponin I, High Sensitivity 0 - 13 ng/L 30 (H) 40 (H)   WBC 4.4 - 11.3 x10*3/uL 8.9    nRBC 0.0 - 0.0 /100 WBCs 0.0    RBC 4.00 - 5.20 x10*6/uL 3.47 (L)    HEMOGLOBIN 12.0 - 16.0 g/dL 11.9 (L)    HEMATOCRIT 36.0 - 46.0 % 36.9    MCV 80 - 100 fL 106 (H)    MCH 26.0 - 34.0 pg 34.3 (H)    MCHC 32.0 - 36.0 g/dL 32.2    RED CELL DISTRIBUTION WIDTH 11.5 - 14.5 % 16.7 (H)    Platelets 150 - 450 x10*3/uL 236    Neutrophils % 40.0 - 80.0 % 67.0    Immature Granulocytes %, Automated 0.0 - 0.9 % 0.7    Lymphocytes % 13.0 - 44.0 % 19.9    Monocytes % 2.0 - 10.0 % 8.4    Eosinophils % 0.0 - 6.0 % 3.8    Basophils % 0.0 - 2.0 % 0.2    Neutrophils Absolute 1.60 - 5.50 x10*3/uL 5.93 (H)    Immature Granulocytes Absolute, Automated 0.00 - 0.50 x10*3/uL 0.06    Lymphocytes Absolute 0.80 - 3.00 x10*3/uL 1.76    Monocytes Absolute 0.05 - 0.80 x10*3/uL 0.74    Eosinophils Absolute 0.00 - 0.40 x10*3/uL 0.34    Basophils Absolute 0.00 - 0.10 x10*3/uL 0.02    (H): Data is abnormally high  (L): Data is abnormally low    XR CHEST:      IMPRESSION:  1. Mild prominence of the interstitial lung markings throughout both  lungs is not significantly changed and may be due to chronic  interstitial lung disease or mild interstitial edema.  2. Nodular densities projecting of the right mid and lower lung are  indeterminate, potentially lung nodules, vessels seen en face or  summation artifact. Recommend further evaluation with CT chest.    CT HEAD:     IMPRESSION:  No evidence of acute intracranial hemorrhage or mass effect.      Stable small hypodensity in the right cerebellum compatible with old  infarct.      If there is concern for superimposed acute ischemia or intracranial  process, MRI may be obtained for further evaluation as clinically  indicated.     Assessment/Plan   Syncope and collapse  - Observation to telemetry  - Check  orthostatic vitals  - Check serial cardiac markers  - Cardiology to see    Borderline elevated troponin levels  - Most likely related to ESRD  - Cardiology follow up    ESRD on HD  - Pt is on the Monday, Wednesday and Friday schedule  - Nephrology consulted    Hypertension  Resume home BP meds: losartan, amlodipine and carvedilol and monitor       I spent 75 minutes in the professional and overall care of this patient.      Sherry Alexander MD

## 2025-04-08 NOTE — CARE PLAN
The patient's goals for the shift include  remain comfortable    The clinical goals for the shift include no syncopal events during shift      Problem: Pain - Adult  Goal: Verbalizes/displays adequate comfort level or baseline comfort level  Outcome: Progressing     Problem: Chronic Conditions and Co-morbidities  Goal: Patient's chronic conditions and co-morbidity symptoms are monitored and maintained or improved  Outcome: Progressing

## 2025-04-09 ENCOUNTER — APPOINTMENT (OUTPATIENT)
Dept: DIALYSIS | Facility: HOSPITAL | Age: 80
End: 2025-04-09
Payer: MEDICARE

## 2025-04-09 VITALS
DIASTOLIC BLOOD PRESSURE: 67 MMHG | HEIGHT: 60 IN | WEIGHT: 172.84 LBS | SYSTOLIC BLOOD PRESSURE: 166 MMHG | OXYGEN SATURATION: 98 % | BODY MASS INDEX: 33.93 KG/M2 | RESPIRATION RATE: 19 BRPM | HEART RATE: 83 BPM | TEMPERATURE: 98.1 F

## 2025-04-09 PROBLEM — R55 SYNCOPE AND COLLAPSE: Status: RESOLVED | Noted: 2025-04-07 | Resolved: 2025-04-09

## 2025-04-09 LAB
ALBUMIN SERPL BCP-MCNC: 3.5 G/DL (ref 3.4–5)
ANION GAP SERPL CALC-SCNC: 19 MMOL/L (ref 10–20)
BUN SERPL-MCNC: 50 MG/DL (ref 6–23)
CALCIUM SERPL-MCNC: 8.4 MG/DL (ref 8.6–10.3)
CHLORIDE SERPL-SCNC: 102 MMOL/L (ref 98–107)
CO2 SERPL-SCNC: 21 MMOL/L (ref 21–32)
CREAT SERPL-MCNC: 6.34 MG/DL (ref 0.5–1.05)
EGFRCR SERPLBLD CKD-EPI 2021: 6 ML/MIN/1.73M*2
ERYTHROCYTE [DISTWIDTH] IN BLOOD BY AUTOMATED COUNT: 16.3 % (ref 11.5–14.5)
GLUCOSE SERPL-MCNC: 83 MG/DL (ref 74–99)
HCT VFR BLD AUTO: 38 % (ref 36–46)
HGB BLD-MCNC: 11.6 G/DL (ref 12–16)
MCH RBC QN AUTO: 33.6 PG (ref 26–34)
MCHC RBC AUTO-ENTMCNC: 30.5 G/DL (ref 32–36)
MCV RBC AUTO: 110 FL (ref 80–100)
NRBC BLD-RTO: 0 /100 WBCS (ref 0–0)
PHOSPHATE SERPL-MCNC: 5.3 MG/DL (ref 2.5–4.9)
PLATELET # BLD AUTO: 220 X10*3/UL (ref 150–450)
POTASSIUM SERPL-SCNC: 4.6 MMOL/L (ref 3.5–5.3)
RBC # BLD AUTO: 3.45 X10*6/UL (ref 4–5.2)
SODIUM SERPL-SCNC: 137 MMOL/L (ref 136–145)
WBC # BLD AUTO: 7.3 X10*3/UL (ref 4.4–11.3)

## 2025-04-09 PROCEDURE — 2500000001 HC RX 250 WO HCPCS SELF ADMINISTERED DRUGS (ALT 637 FOR MEDICARE OP): Performed by: INTERNAL MEDICINE

## 2025-04-09 PROCEDURE — G0378 HOSPITAL OBSERVATION PER HR: HCPCS

## 2025-04-09 PROCEDURE — 36415 COLL VENOUS BLD VENIPUNCTURE: CPT | Performed by: PHYSICIAN ASSISTANT

## 2025-04-09 PROCEDURE — 2500000001 HC RX 250 WO HCPCS SELF ADMINISTERED DRUGS (ALT 637 FOR MEDICARE OP): Performed by: PHYSICIAN ASSISTANT

## 2025-04-09 PROCEDURE — 96372 THER/PROPH/DIAG INJ SC/IM: CPT | Performed by: INTERNAL MEDICINE

## 2025-04-09 PROCEDURE — 84132 ASSAY OF SERUM POTASSIUM: CPT | Performed by: PHYSICIAN ASSISTANT

## 2025-04-09 PROCEDURE — 6350000001 HC RX 635 EPOETIN >10,000 UNITS: Mod: JW | Performed by: INTERNAL MEDICINE

## 2025-04-09 PROCEDURE — 90937 HEMODIALYSIS REPEATED EVAL: CPT

## 2025-04-09 PROCEDURE — 2500000004 HC RX 250 GENERAL PHARMACY W/ HCPCS (ALT 636 FOR OP/ED): Performed by: INTERNAL MEDICINE

## 2025-04-09 PROCEDURE — 2500000005 HC RX 250 GENERAL PHARMACY W/O HCPCS: Performed by: INTERNAL MEDICINE

## 2025-04-09 PROCEDURE — 96372 THER/PROPH/DIAG INJ SC/IM: CPT | Mod: 59 | Performed by: PHYSICIAN ASSISTANT

## 2025-04-09 PROCEDURE — 2500000004 HC RX 250 GENERAL PHARMACY W/ HCPCS (ALT 636 FOR OP/ED): Performed by: PHYSICIAN ASSISTANT

## 2025-04-09 PROCEDURE — 85027 COMPLETE CBC AUTOMATED: CPT | Performed by: PHYSICIAN ASSISTANT

## 2025-04-09 RX ADMIN — Medication 1 CAPSULE: at 09:19

## 2025-04-09 RX ADMIN — HYDROXYUREA 500 MG: 500 CAPSULE ORAL at 09:21

## 2025-04-09 RX ADMIN — CARVEDILOL 25 MG: 25 TABLET, FILM COATED ORAL at 16:28

## 2025-04-09 RX ADMIN — AMLODIPINE BESYLATE 5 MG: 5 TABLET ORAL at 16:28

## 2025-04-09 RX ADMIN — Medication 2 L/MIN: at 09:16

## 2025-04-09 RX ADMIN — Medication 1 CAPSULE: at 09:20

## 2025-04-09 RX ADMIN — HEPARIN SODIUM 5000 UNITS: 5000 INJECTION INTRAVENOUS; SUBCUTANEOUS at 11:16

## 2025-04-09 RX ADMIN — CALCIUM CARBONATE 1500 MG: 500 TABLET, CHEWABLE ORAL at 11:18

## 2025-04-09 RX ADMIN — EPOETIN ALFA-EPBX 2000 UNITS: 10000 INJECTION, SOLUTION INTRAVENOUS; SUBCUTANEOUS at 17:36

## 2025-04-09 RX ADMIN — CALCIUM CARBONATE 1500 MG: 500 TABLET, CHEWABLE ORAL at 09:16

## 2025-04-09 RX ADMIN — CALCITRIOL CAPSULES 0.25 MCG 0.25 MCG: 0.25 CAPSULE ORAL at 09:25

## 2025-04-09 RX ADMIN — HEPARIN SODIUM 5000 UNITS: 5000 INJECTION INTRAVENOUS; SUBCUTANEOUS at 03:16

## 2025-04-09 RX ADMIN — Medication 2000 UNITS: at 09:19

## 2025-04-09 RX ADMIN — LOSARTAN POTASSIUM 100 MG: 50 TABLET, FILM COATED ORAL at 16:28

## 2025-04-09 ASSESSMENT — PAIN SCALES - WONG BAKER: WONGBAKER_NUMERICALRESPONSE: NO HURT

## 2025-04-09 ASSESSMENT — COGNITIVE AND FUNCTIONAL STATUS - GENERAL
DRESSING REGULAR UPPER BODY CLOTHING: A LITTLE
WALKING IN HOSPITAL ROOM: A LITTLE
DAILY ACTIVITIY SCORE: 20
TOILETING: A LITTLE
STANDING UP FROM CHAIR USING ARMS: A LITTLE
MOVING TO AND FROM BED TO CHAIR: A LITTLE
MOBILITY SCORE: 20
CLIMB 3 TO 5 STEPS WITH RAILING: A LITTLE
DRESSING REGULAR LOWER BODY CLOTHING: A LITTLE
HELP NEEDED FOR BATHING: A LITTLE

## 2025-04-09 ASSESSMENT — PAIN SCALES - GENERAL
PAINLEVEL_OUTOF10: 0 - NO PAIN
PAINLEVEL_OUTOF10: 0 - NO PAIN

## 2025-04-09 ASSESSMENT — PAIN - FUNCTIONAL ASSESSMENT: PAIN_FUNCTIONAL_ASSESSMENT: NO/DENIES PAIN

## 2025-04-09 NOTE — CONSULTS
Reason For Consult  ESRD    History Of Present Illness  Joyce Bethea is a 79 y.o. female presenting with syncope.  Had dialysis yesterday.  Was doing okay but a couple of hours after she got home she passed out.  Dialysis was fairly uneventful.  She took off 2.3 L which is a little bit more than usual.  She usually takes off no more than 2 L but she was that much over her dry weight.  Has had a cardiac evaluation here in the hospital that has been more or less negative.  Sounds like plan is for discharge later today after dialysis.  Blood pressure was low today so amlodipine was held.  Denies any other issues prior to this     Past Medical History  She has a past medical history of Body mass index (BMI) 31.0-31.9, adult (04/12/2019), Encounter for follow-up examination after completed treatment for conditions other than malignant neoplasm (11/30/2017), Other conditions influencing health status (03/12/2019), Other specified disorders of bone, unspecified site (03/11/2015), Personal history of malignant neoplasm of breast (02/05/2014), Personal history of malignant neoplasm, unspecified, Personal history of other diseases of the circulatory system, Personal history of other diseases of the digestive system, Personal history of other diseases of the musculoskeletal system and connective tissue (01/21/2016), Personal history of other diseases of the musculoskeletal system and connective tissue, Personal history of other diseases of the nervous system and sense organs, Personal history of other diseases of urinary system, Respiratory failure, unspecified with hypoxia (12/29/2017), Shortness of breath (10/06/2017), Unspecified cataract, and Unspecified fracture of unspecified forearm, sequela (10/04/2018).    Surgical History  She has a past surgical history that includes Colonoscopy (02/05/2014); Tonsillectomy (02/05/2014); Mastectomy (02/05/2014); Rotator cuff repair (04/01/2014); Knee arthroscopy w/ meniscal  repair (04/01/2014); Other surgical history (09/21/2021); MR angio head wo IV contrast (2/23/2019); MR angio neck wo IV contrast (2/23/2019); and IR CVC tunneled (10/12/2021).     Social History  She reports that she has never smoked. She has never used smokeless tobacco. She reports that she does not drink alcohol and does not use drugs.    Family History  Family History   Problem Relation Name Age of Onset    Hypertension Mother      Leukemia Mother      Osteoarthritis Mother      Colon cancer Father      Diabetes Father      Hypertension Father      Osteoarthritis Sister      Osteoarthritis Other Aunt     Stroke Other Aunt     Stroke Other Uncle     Breast cancer Other Family History         Allergies  Bee pollen, House dust, House dust mite, and Adhesive tape-silicones       Physical Exam  NAD.  Awake alert answer questions appropriately.  Nontoxic.  Appears stated age.  Does not appear frail or chronically ill.  No lower extremity edema         I&O 24HR  No intake or output data in the 24 hours ending 04/09/25 0930    Vitals 24HR  Heart Rate:  [67-93]   Temp:  [36.4 °C (97.5 °F)-36.7 °C (98.1 °F)]   Resp:  [17-22]   BP: (149-202)/()   Height:  [152.4 cm (5')]   Weight:  [78.4 kg (172 lb 13.5 oz)]   SpO2:  [92 %-96 %]          Assessment/Plan   #1 ESRD on hemodialysis Monday Wednesday Friday  #2 Hypertensive heart and kidney failure    I suspect that she had a syncopal episode related to her ultrafiltration rate.  She looks pretty good today    Plan for dialysis today.  1 L of ultrafiltration.  She is okay for discharge after that.  I will touch base with her nephrologist Dr. Garcia to advise to indicate UF limit of 2 L    Assessment & Plan  Syncope and collapse            Jewell Kasper MD

## 2025-04-09 NOTE — POST-PROCEDURE NOTE
Report to Receiving RN:    Report To: RENNY PADILLA  Time Report Called: [id number]  Hand-Off Communication: PATIENT STABLE; BP HIGH; PATIENT STATUS VIA EPIC  Complications During Treatment: No  Ultrafiltration Treatment: No  Medications Administered During Dialysis: No  Blood Products Administered During Dialysis: No  Labs Sent During Dialysis: No  Heparin Drip Rate Changes: No  Dialysis Catheter Dressing: N/A  Last Dressing Change: AVF    Electronic Signatures:  TAYLOR MAST     Last Updated: 4:55 PM by TAYLOR MAST

## 2025-04-09 NOTE — CARE PLAN
Problem: Pain - Adult  Goal: Verbalizes/displays adequate comfort level or baseline comfort level  Outcome: Progressing     Problem: Safety - Adult  Goal: Free from fall injury  Outcome: Progressing     Problem: Discharge Planning  Goal: Discharge to home or other facility with appropriate resources  Outcome: Progressing     Problem: Chronic Conditions and Co-morbidities  Goal: Patient's chronic conditions and co-morbidity symptoms are monitored and maintained or improved  Outcome: Progressing     Problem: Nutrition  Goal: Nutrient intake appropriate for maintaining nutritional needs  Outcome: Progressing   The patient's goals for the shift include  To be discharged home     The clinical goals for the shift include no syncopal episodes throughout the shift     Over the shift, the patient did not make progress toward the following goals. Barriers to progression include none. Recommendations to address these barriers include none.

## 2025-04-09 NOTE — PRE-PROCEDURE NOTE
Report from Sending RN:    Report From: RENNY PADILLA RN  Recent Surgery of Procedure: No  Baseline Level of Consciousness (LOC): A+OX4  Oxygen Use: Yes  Type: 2L NC  Diabetic: No  Last BP Med Given Day of Dialysis: SEE EMR  Last Pain Med Given: SEE EMR  Lab Tests to be Obtained with Dialysis: No  Blood Transfusion to be Given During Dialysis: No  Available IV Access: Yes  Medications to be Administered During Dialysis: No  Continuous IV Infusion Running: No  Restraints on Currently or in the Last 24 Hours: No  Hand-Off Communication: FULL CODE  Dialysis Catheter Dressing: N/A  Last Dressing Change: AVF

## 2025-04-09 NOTE — DISCHARGE SUMMARY
Discharge Diagnosis  Syncope and collapse 2/2 orthostasis, labile BP    Issues Requiring Follow-Up  PCP in 1 week  Continue HD three times a week  Cardiology recs to consider increasing norvasc to 10 mg daily if needed in the future    Discharge Meds     Medication List      CONTINUE taking these medications     acetaminophen 325 mg tablet; Commonly known as: Tylenol   amitriptyline 10 mg tablet; Commonly known as: Elavil   amLODIPine 5 mg tablet; Commonly known as: Norvasc; Take 1 tablet (5 mg)   by mouth once daily.   atorvastatin 20 mg tablet; Commonly known as: Lipitor; Take 1 tablet (20   mg) by mouth once daily at bedtime.   benzonatate 200 mg capsule; Commonly known as: Tessalon   calcitriol 0.25 mcg capsule; Commonly known as: Rocaltrol   calcium carbonate 500 mg (200 mg elemental) chewable tablet; Commonly   known as: Tums   carvedilol 25 mg tablet; Commonly known as: Coreg   cholecalciferol 50 mcg (2,000 units) capsule; Commonly known as: Vitamin   D-3   guaiFENesin 600 mg 12 hr tablet; Commonly known as: Mucinex   hydroxyurea 500 mg capsule; Commonly known as: Hydrea; Take 1 capsule   (500 mg total) by mouth once a day on Monday, Wednesday, and Friday.  M,   W, F   loratadine 10 mg tablet; Commonly known as: Claritin   losartan 100 mg tablet; Commonly known as: Cozaar   meclizine 12.5 mg tablet; Commonly known as: Antivert; Take 1 tablet   (12.5 mg) by mouth 3 times a day as needed for dizziness.   nitroglycerin 0.4 mg SL tablet; Commonly known as: Nitrostat   omeprazole 20 mg DR capsule; Commonly known as: PriLOSEC   ondansetron ODT 4 mg disintegrating tablet; Commonly known as:   Zofran-ODT; Dissolve 1 tablet (4 mg) in the mouth every 8 hours if needed   for nausea or vomiting.   oxygen gas therapy; Commonly known as: O2   PROBIOTIC FORMULA (INULIN) ORAL   Procrit 2,000 unit/mL injection; Generic drug: epoetin palomo   Senna-S 8.6-50 mg tablet; Generic drug: sennosides-docusate sodium   sodium chloride  0.65 % nasal spray; Commonly known as: Ocean   vitamin B complex-vitamin C-folic acid 1 mg capsule; Commonly known as:   Nephrocaps     STOP taking these medications     predniSONE 20 mg tablet; Commonly known as: Deltasone     ASK your doctor about these medications     trolamine salicylate 10 % cream; Commonly known as: Aspercreme       Test Results Pending At Discharge  Pending Labs       No current pending labs.            Hospital Course   Per HPI:  Joyce Bethea is an 79 y.o. 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  Patient says she had hemodialysis earlier today and they took off 2.3 L which is more than what they usually take off.  She got back to the SNF at about 2 PM, and except for being fatigued -- which is typical for her after dialysis, she was okay.  She says she got up at about 4:45 PM to go to the bathroom and felt weak all over.  She says that she felt lightheaded while walking and the next thing she remembers is being in a chair and the nurses telling her that she had passed out.  She denies having any chest pain or palpitations. She says she is chronically SOB with exertion.  EMS were called and she was brought to the ED.  CT of the head did not reveal any acute intracranial process.     Orthostats were positive upon arrival but this resolved during stay. She was monitored on tele, evaluated by cardiology, echo and VQ scan were done and she was cleared for dc by cardiology. Echo: preserved EF, severely increased concentric left ventricular hypertrophy. Cardiology recs to consider increasing norvasc to 10 mg daily if needed in the future, BP h has been labile and will need close monitoring. Nephrology followed for chronic HD and she was dialyzed on 4/9, day of discharge. On day of discharge, patient denied CP, SOB,  n/v/diarrhea/constipation, was tolerating diet.  Patient appeared hemodynamically stable at time of discharge. Discussed with attending physician who agreed with discharge plan.  Time spent on discharge including patient evaluation, education, coordination of care with consultants, attending physician, care coordination and nursing was 35 minutes.        Pertinent Physical Exam At Time of Discharge  Physical Exam  Gen: NAD  Eyes:  EOM intact  ENT: MM dry  Neck: No JVD  Respiratory: CTAB, no wheezes/rhonchi, on chronic 2 L oxygen  Cardiac: RRR, +SM  Abdomen: soft, NT, +BS  Extremities: no edema or cyanosis  Neuro: No focal deficits, alert and oriented x 3  Psych:  appropriate mood and behavior  Outpatient Follow-Up  Future Appointments   Date Time Provider Department Center   4/23/2025 10:00 AM [facility]   5/15/2025 10:30 AM ANASTASIA Acevedo-CNP [id number] [facility]   6/19/2025  1:00 PM Azul Zuñiga PA-C [id number] [facility]   10/28/2025  2:20 PM Mercedes Manning MD [id number] [facility]         Mikayla Sellers PA-C

## 2025-04-09 NOTE — PROGRESS NOTES
04/09/25 1157   Discharge Planning   Living Arrangements Other (Comment)  ((Patient is a sister from [facility] [place], and [facility].))   Support Systems Taoist/jami community;Friends/neighbors   Assistance Needed Alert and oriented x 3, Requires assistance with ADl's, Walker, and Electric Scooter used at [place], Doesn't drive, Patient reports that she is seen and treated by PT/OT 2 times per week, Utilizes home O2 at 2 liters at rest and 3-4 liters with exertion. Patient is a dialysis patient and receives her dialysis at [facility] on [facility] in [place], [place], M-W-F.   Type of Residence Nursing home/residential care  ([facility])   Do you have animals or pets at home? No   Who is requesting discharge planning? Provider   Home or Post Acute Services Other (Comment)  ((Return to [facility] when medically ready.))   Type of Post Acute Facility Services Long term care   Expected Discharge Disposition Inter   Does the patient need discharge transport arranged? Yes   RoundTrip coordination needed? Yes   Has discharge transport been arranged? No   Patient Choice   Provider Choice list and CMS website (https://medicare.gov/care-compare#search) for post-acute Quality and Resource Measure Data were provided and reviewed with: Patient   Patient / Family choosing to utilize agency / facility established prior to hospitalization Yes

## 2025-04-09 NOTE — PROGRESS NOTES
Joyce Bethea is a 79 y.o. female on day 0 of admission presenting with Syncope and collapse.      Subjective   Feeling better. Denies CP, ate breakfast. HD today and discharge after.       Objective     Last Recorded Vitals  BP (!) 197/83 (Patient Position: Standing)   Pulse 79   Temp 36.4 °C (97.5 °F)   Resp 20   Wt 78.4 kg (172 lb 13.5 oz)   SpO2 95%   Intake/Output last 3 Shifts:  No intake or output data in the 24 hours ending 04/09/25 1131      Admission Weight  Weight: 75.1 kg (165 lb 9.1 oz) (04/07/25 2211)    Daily Weight  04/09/25 : 78.4 kg (172 lb 13.5 oz)    Image Results  NM Lung perfusion with spect/ct  Narrative: Interpreted By:  Luis Fernando Theodore,  Berenice August   STUDY:  NM LUNG PERFUSION WITH SPECT/CT;  4/8/2025 1:40 pm      INDICATION:  Signs/Symptoms:syncope, SOB, deconditioning, rule out PE.      COMPARISON:  Chest x-ray from      ACCESSION NUMBER(S):  [id number]      ORDERING CLINICIAN:  ÁNGELA HERNANDEZ      TECHNIQUE:  DIVISION OF NUCLEAR MEDICINE  PERFUSION LUNG SCANS      Multiple perfusion images of the lungs were acquired after the  intravenous administration of 4.1 mCi of Tc-99m macroaggregated  albumin (MAA). In addition, SPECT/CT of the chest was performed.      FINDINGS:  Perfusion images of both lungs demonstrate patchy heterogeneity  throughout the lung fields bilaterally. There are no distinct  wedge-shaped subsegmental or segmental perfusion defect seen on SPECT  CT to suggest acute pulmonary embolism.      Impression: No distinct wedge-shaped subsegmental or segmental perfusion defect  seen on SPECT CT to suggest acute pulmonary embolism (Low  probability).      The interpretation above is based on modified PIOPED II and PISAPED  criteria.      I personally reviewed the images/study and I agree with the findings  as stated by Mata Kovacs MD (resident).      MACRO:  None      Signed by: Luis Fernando Theodore 4/8/2025 1:56 PM  Dictation workstation:    [id number]  Transthoracic Echo (TTE) Complete     [facility], [address] [address]                Tel [phone] and Fax [phone]    TRANSTHORACIC ECHOCARDIOGRAM REPORT       Patient Name:       ERENDIRA MORROW       Reading Physician:    [id number] Pema Victoria MD  Study Date:         4/8/2025            Ordering Provider:    [id number] HERNANDEZ  MRN/PID:            [id number]            Fellow:  Accession#:         [id number]        Nurse:  Date of Birth/Age:  1945 / 79     Sonographer:          Trudy muñoz RDCS  Gender assigned at  F                   Additional Staff:  Birth:  Height:             152.40 cm           Admit Date:           4/7/2025  Weight:             74.84 kg            Admission Status:     Inpatient -                                                                Routine  BSA / BMI:          1.72 m2 / 32.22     Encounter#:           [id number]                      kg/m2  Blood Pressure:     96/43 mmHg          Department Location:  [facility] Non                                                                Invasive    Study Type:    TRANSTHORACIC ECHO (TTE) COMPLETE  Diagnosis/ICD: Syncope and collapse-R55  Indication:    Syncope and collapse  CPT Code:      Echo Complete w Full Doppler-[id number]    Patient History:  Pertinent History: ESRD, Elev. troponin.    Study Detail: The following Echo studies were performed: 2D, M-Mode, Doppler and                color flow. Technically challenging study due to patient lying in                supine position, HOB elevated and prominent lung artifact.                Definity used as a contrast agent for endocardial border                definition. Total contrast used for this procedure was 2.0 mL via                IV  push. The patient was awake.       PHYSICIAN INTERPRETATION:  Left Ventricle: The left ventricular systolic function is normal, with a visually estimated ejection fraction of 65-70%. There is severely increased concentric left ventricular hypertrophy. There are no regional left ventricular wall motion abnormalities. The left ventricular cavity size is normal. There is mildly increased septal thickness. The interventricular septum is flattened in diastole ('D' shaped left ventricle), consistent with right ventricular volume overload. Left ventricular diastolic filling cannot be determined due to E/A wave fusion.  Left Atrium: The left atrial size is upper limits of normal.  Right Ventricle: The right ventricle is normal in size. There is normal right ventricular global systolic function.  Right Atrium: The right atrium is normal in size.  Aortic Valve: The aortic valve is trileaflet. There is mild to moderate aortic valve cusp calcification. The aortic valve dimensionless index is 0.32. There is no evidence of aortic valve regurgitation. The peak instantaneous gradient of the aortic valve is 10 mmHg. The mean gradient of the aortic valve is 6 mmHg.  Mitral Valve: The mitral valve is mild to moderately thickened. There is evidence of mild mitral valve stenosis. The doppler estimated mean and peak diastolic pressure gradients are 3 mmHg and 8 mmHg respectively. There is moderate mitral annular calcification. The peak instantaneous gradient of the mitral valve is 8 mmHg. There is trace to mild mitral valve regurgitation. Calcified papillary muscle.  Tricuspid Valve: The tricuspid valve is structurally normal. There is mild tricuspid regurgitation. The Doppler estimated RVSP is moderately elevated at 56.0 mmHg.  Pulmonic Valve: The pulmonic valve is structurally normal. There is physiologic pulmonic valve regurgitation.  Pericardium: There is no pericardial effusion noted.  Aorta: The aortic root was not well  visualized.  Systemic Veins: The inferior vena cava appears small in size, with IVC inspiratory collapse greater than 50%.       CONCLUSIONS:   1. The left ventricular systolic function is normal, with a visually estimated ejection fraction of 65-70%.   2. Right ventricular volume overload.   3. There is severely increased concentric left ventricular hypertrophy.   4. There is normal right ventricular global systolic function.   5. There is mild mitral valve stenosis.   6. There is moderate mitral annular calcification.   7. Moderately elevated right ventricular systolic pressure.    QUANTITATIVE DATA SUMMARY:     2D MEASUREMENTS:          Normal Ranges:  IVSd:            1.22 cm  (0.6-1.1cm)  LVPWd:           1.15 cm  (0.6-1.1cm)  LVIDd:           5.07 cm  (3.9-5.9cm)  LVIDs:           2.82 cm  LV Mass Index:   137 g/m2  LVEDV Index:     28 ml/m2  LV % FS          44.5 %       LEFT ATRIUM:                  Normal Ranges:  LA Vol A4C:        48.7 ml    (22+/-6mL/m2)  LA Vol A2C:        47.8 ml  LA Vol BP:         48.7 ml  LA Vol Index A4C:  28.3 ml/m2  LA Vol Index A2C:  27.8 ml/m2  LA Vol Index BP:   28.3 ml/m2  LA Area A4C:       17.1 cm2  LA Area A2C:       17.1 cm2  LA Major Axis A4C: 5.1 cm  LA Major Axis A2C: 5.2 cm  LA Volume Index:   28.3 ml/m2  LA Vol A4C:        45.0 ml  LA Vol A2C:        45.6 ml  LA Vol Index BSA:  26.3 ml/m2       RIGHT ATRIUM:          Normal Ranges:  RA Area A4C:  12.7 cm2       AORTA MEASUREMENTS:         Normal Ranges:  Ao Sinus, d:        2.60 cm (2.1-3.5cm)  Asc Ao, d:          2.70 cm (2.1-3.4cm)       LV SYSTOLIC FUNCTION:                       Normal Ranges:  EF-A4C View:    72 % (>=55%)  EF-A2C View:    79 %  EF-Biplane:     76 %  EF-Visual:      68 %  LV EF Reported: 68 %       MITRAL VALVE:          Normal Ranges:  MV Vmax:      1.42 m/s (<=1.3m/s)  MV peak P.1 mmHg (<5mmHg)  MV mean PG:   3.3 mmHg (<2mmHg)  MV VTI:       29.18 cm (10-13cm)       AORTIC VALVE:                       Normal Ranges:  AoV Vmax:                1.59 m/s  (<=1.7m/s)  AoV Peak PG:             10.1 mmHg (<20mmHg)  AoV Mean P.7 mmHg  (1.7-11.5mmHg)  LVOT Max Chidi:            0.67 m/s  (<=1.1m/s)  AoV VTI:                 36.33 cm  (18-25cm)  LVOT VTI:                11.56 cm  LVOT Diameter:           1.98 cm   (1.8-2.4cm)  AoV Area, VTI:           0.98 cm2  (2.5-5.5cm2)  AoV Area,Vmax:           1.29 cm2  (2.5-4.5cm2)  AoV Dimensionless Index: 0.32       RIGHT VENTRICLE:  RV Basal 3.20 cm  RV Mid   2.30 cm  RV Major 7.5 cm  TAPSE:   20.0 mm  RV s'    0.08 m/s       TRICUSPID VALVE/RVSP:          Normal Ranges:  Peak TR Velocity:     3.64 m/s  RV Syst Pressure:     56 mmHg  (< 30mmHg)  IVC Diam:             1.10 cm       PULMONIC VALVE:          Normal Ranges:  PV Max Chidi:     0.9 m/s  (0.6-0.9m/s)  PV Max PG:      3.0 mmHg       AORTA:  Asc Ao Diam 2.71 cm       [id number] Pema Victoria MD  Electronically signed on 2025 at 11:51:56 AM       ** Final **  ECG 12 lead  Normal sinus rhythm  Minimal voltage criteria for LVH, may be normal variant ( [place] product )  Borderline ECG  When compared with ECG of 2025 18:26,  No significant change was found  See ED provider note for full interpretation and clinical correlation  Confirmed by Lucero Metcalf ([id number]) on 2025 10:27:23 AM      Physical Exam  Physical Exam  Gen: NAD  Eyes:  EOM intact  ENT: MM dry  Neck: No JVD  Respiratory: CTAB, no wheezes/rhonchi, on chronic 2 L oxygen  Cardiac: RRR, +SM  Abdomen: soft, NT, +BS  Extremities: no edema or cyanosis  Neuro: No focal deficits, alert and oriented x 3  Psych:  appropriate mood and behavior      Assessment/Plan      Syncope and collapse  - likely multifactorial: orthostasis, possible low oxygen level, deconditioning  - continue telemetry  - orthostatics now negative  - Cardiology recs appreciated, echo shows severe LVH, no medication changes recommended  - UA without infection     Borderline  elevated troponin levels  - Most likely related to ESRD  - Cardiology follow up>cleared her for dc    Hypertension/Labile BP  -losartan and carvedilol, amlodipine   -continue to monitor     ESRD on HD  - Pt is on the Monday, Wednesday and Friday schedule  - Nephrology consulted, HD today     DVT prophy  -heparin subcutaneous    Dispo: stable for dc after HD today  D/w Dr. Sheron Sellers, PA-C

## 2025-04-16 LAB
ATRIAL RATE: 79 BPM
P AXIS: 63 DEGREES
P OFFSET: 182 MS
P ONSET: 133 MS
PR INTERVAL: 170 MS
Q ONSET: 218 MS
QRS COUNT: 13 BEATS
QRS DURATION: 94 MS
QT INTERVAL: 440 MS
QTC CALCULATION(BAZETT): 504 MS
QTC FREDERICIA: 482 MS
R AXIS: 21 DEGREES
T AXIS: 41 DEGREES
T OFFSET: 438 MS
VENTRICULAR RATE: 79 BPM

## 2025-04-22 ENCOUNTER — HOSPITAL ENCOUNTER (OUTPATIENT)
Dept: CARDIOLOGY | Facility: HOSPITAL | Age: 80
Discharge: HOME | End: 2025-04-22
Payer: MEDICARE

## 2025-04-22 DIAGNOSIS — R55 SYNCOPE, UNSPECIFIED SYNCOPE TYPE: ICD-10-CM

## 2025-04-22 PROCEDURE — 93246 EXT ECG>7D<15D RECORDING: CPT

## 2025-04-23 ENCOUNTER — APPOINTMENT (OUTPATIENT)
Dept: CARDIOLOGY | Facility: HOSPITAL | Age: 80
End: 2025-04-23
Payer: MEDICARE

## 2025-04-25 ENCOUNTER — LAB REQUISITION (OUTPATIENT)
Dept: LAB | Facility: HOSPITAL | Age: 80
End: 2025-04-25
Payer: MEDICARE

## 2025-04-25 DIAGNOSIS — N18.6 END STAGE RENAL DISEASE (MULTI): ICD-10-CM

## 2025-04-25 DIAGNOSIS — Z99.2 DEPENDENCE ON RENAL DIALYSIS (CMS-HCC): ICD-10-CM

## 2025-04-25 LAB
BUN PRE DIAL SERPL-MCNC: 63 MG/DL (ref 6–23)
POTASSIUM SERPL-SCNC: 4.3 MMOL/L (ref 3.5–5.3)

## 2025-04-25 PROCEDURE — 84132 ASSAY OF SERUM POTASSIUM: CPT

## 2025-05-15 ENCOUNTER — APPOINTMENT (OUTPATIENT)
Dept: CARDIOLOGY | Facility: HOSPITAL | Age: 80
End: 2025-05-15
Payer: MEDICARE

## 2025-05-28 ENCOUNTER — HOSPITAL ENCOUNTER (EMERGENCY)
Facility: HOSPITAL | Age: 80
Discharge: HOME | End: 2025-05-29
Attending: EMERGENCY MEDICINE
Payer: MEDICARE

## 2025-05-28 DIAGNOSIS — R55 NEAR SYNCOPE: Primary | ICD-10-CM

## 2025-05-28 LAB
BASOPHILS # BLD AUTO: 0.06 X10*3/UL (ref 0–0.1)
BASOPHILS NFR BLD AUTO: 0.7 %
EOSINOPHIL # BLD AUTO: 0.32 X10*3/UL (ref 0–0.4)
EOSINOPHIL NFR BLD AUTO: 3.5 %
ERYTHROCYTE [DISTWIDTH] IN BLOOD BY AUTOMATED COUNT: 16.6 % (ref 11.5–14.5)
HCT VFR BLD AUTO: 45.4 % (ref 36–46)
HGB BLD-MCNC: 14.7 G/DL (ref 12–16)
IMM GRANULOCYTES # BLD AUTO: 0.04 X10*3/UL (ref 0–0.5)
IMM GRANULOCYTES NFR BLD AUTO: 0.4 % (ref 0–0.9)
LYMPHOCYTES # BLD AUTO: 1.5 X10*3/UL (ref 0.8–3)
LYMPHOCYTES NFR BLD AUTO: 16.4 %
MCH RBC QN AUTO: 31.9 PG (ref 26–34)
MCHC RBC AUTO-ENTMCNC: 32.4 G/DL (ref 32–36)
MCV RBC AUTO: 99 FL (ref 80–100)
MONOCYTES # BLD AUTO: 0.72 X10*3/UL (ref 0.05–0.8)
MONOCYTES NFR BLD AUTO: 7.9 %
NEUTROPHILS # BLD AUTO: 6.48 X10*3/UL (ref 1.6–5.5)
NEUTROPHILS NFR BLD AUTO: 71.1 %
NRBC BLD-RTO: 0 /100 WBCS (ref 0–0)
PLATELET # BLD AUTO: 321 X10*3/UL (ref 150–450)
RBC # BLD AUTO: 4.61 X10*6/UL (ref 4–5.2)
WBC # BLD AUTO: 9.1 X10*3/UL (ref 4.4–11.3)

## 2025-05-28 PROCEDURE — 84484 ASSAY OF TROPONIN QUANT: CPT | Performed by: EMERGENCY MEDICINE

## 2025-05-28 PROCEDURE — 83735 ASSAY OF MAGNESIUM: CPT | Performed by: EMERGENCY MEDICINE

## 2025-05-28 PROCEDURE — 2500000004 HC RX 250 GENERAL PHARMACY W/ HCPCS (ALT 636 FOR OP/ED): Performed by: EMERGENCY MEDICINE

## 2025-05-28 PROCEDURE — 85025 COMPLETE CBC W/AUTO DIFF WBC: CPT | Performed by: EMERGENCY MEDICINE

## 2025-05-28 PROCEDURE — 36415 COLL VENOUS BLD VENIPUNCTURE: CPT | Performed by: EMERGENCY MEDICINE

## 2025-05-28 PROCEDURE — 83605 ASSAY OF LACTIC ACID: CPT | Performed by: EMERGENCY MEDICINE

## 2025-05-28 PROCEDURE — 99284 EMERGENCY DEPT VISIT MOD MDM: CPT | Performed by: EMERGENCY MEDICINE

## 2025-05-28 PROCEDURE — 80048 BASIC METABOLIC PNL TOTAL CA: CPT | Performed by: EMERGENCY MEDICINE

## 2025-05-28 RX ORDER — ONDANSETRON HYDROCHLORIDE 2 MG/ML
4 INJECTION, SOLUTION INTRAVENOUS ONCE
Status: DISCONTINUED | OUTPATIENT
Start: 2025-05-28 | End: 2025-05-29 | Stop reason: HOSPADM

## 2025-05-28 RX ADMIN — SODIUM CHLORIDE 1000 ML: 0.9 INJECTION, SOLUTION INTRAVENOUS at 23:57

## 2025-05-28 NOTE — Clinical Note
Patient discharged from ED with discharge instructions and follow up care. Pt verbalized understanding of teaching prior to discharge. IV removed with catheter intact and gauze dressing applied. No other needs prior to discharge. Pt ambulated out of  ED.

## 2025-05-29 ENCOUNTER — APPOINTMENT (OUTPATIENT)
Dept: RADIOLOGY | Facility: HOSPITAL | Age: 80
End: 2025-05-29
Payer: MEDICARE

## 2025-05-29 VITALS
DIASTOLIC BLOOD PRESSURE: 66 MMHG | RESPIRATION RATE: 14 BRPM | HEART RATE: 71 BPM | HEIGHT: 60 IN | BODY MASS INDEX: 34.36 KG/M2 | WEIGHT: 175 LBS | TEMPERATURE: 97.2 F | SYSTOLIC BLOOD PRESSURE: 157 MMHG | OXYGEN SATURATION: 98 %

## 2025-05-29 LAB
ALBUMIN SERPL BCP-MCNC: 4.2 G/DL (ref 3.4–5)
ALP SERPL-CCNC: 237 U/L (ref 33–136)
ALT SERPL W P-5'-P-CCNC: 25 U/L (ref 7–45)
ANION GAP SERPL CALC-SCNC: 13 MMOL/L (ref 10–20)
AST SERPL W P-5'-P-CCNC: 23 U/L (ref 9–39)
BILIRUB SERPL-MCNC: 0.4 MG/DL (ref 0–1.2)
BUN SERPL-MCNC: 40 MG/DL (ref 6–23)
CALCIUM SERPL-MCNC: 9.5 MG/DL (ref 8.6–10.3)
CARDIAC TROPONIN I PNL SERPL HS: 24 NG/L (ref 0–13)
CARDIAC TROPONIN I PNL SERPL HS: 27 NG/L (ref 0–13)
CHLORIDE SERPL-SCNC: 98 MMOL/L (ref 98–107)
CO2 SERPL-SCNC: 27 MMOL/L (ref 21–32)
CREAT SERPL-MCNC: 4.42 MG/DL (ref 0.5–1.05)
EGFRCR SERPLBLD CKD-EPI 2021: 10 ML/MIN/1.73M*2
GLUCOSE SERPL-MCNC: 112 MG/DL (ref 74–99)
LACTATE SERPL-SCNC: 0.7 MMOL/L (ref 0.4–2)
MAGNESIUM SERPL-MCNC: 1.94 MG/DL (ref 1.6–2.4)
POTASSIUM SERPL-SCNC: 4.5 MMOL/L (ref 3.5–5.3)
PROT SERPL-MCNC: 6.9 G/DL (ref 6.4–8.2)
SODIUM SERPL-SCNC: 133 MMOL/L (ref 136–145)

## 2025-05-29 PROCEDURE — 70450 CT HEAD/BRAIN W/O DYE: CPT | Performed by: RADIOLOGY

## 2025-05-29 PROCEDURE — 74176 CT ABD & PELVIS W/O CONTRAST: CPT

## 2025-05-29 PROCEDURE — 70450 CT HEAD/BRAIN W/O DYE: CPT

## 2025-05-29 PROCEDURE — 96360 HYDRATION IV INFUSION INIT: CPT

## 2025-05-29 PROCEDURE — 84484 ASSAY OF TROPONIN QUANT: CPT | Performed by: EMERGENCY MEDICINE

## 2025-05-29 PROCEDURE — 74176 CT ABD & PELVIS W/O CONTRAST: CPT | Performed by: RADIOLOGY

## 2025-05-29 PROCEDURE — 36415 COLL VENOUS BLD VENIPUNCTURE: CPT | Performed by: EMERGENCY MEDICINE

## 2025-05-29 PROCEDURE — 96361 HYDRATE IV INFUSION ADD-ON: CPT

## 2025-05-29 ASSESSMENT — PAIN SCALES - GENERAL
PAINLEVEL_OUTOF10: 0 - NO PAIN
PAINLEVEL_OUTOF10: 0 - NO PAIN

## 2025-05-29 ASSESSMENT — LIFESTYLE VARIABLES
EVER HAD A DRINK FIRST THING IN THE MORNING TO STEADY YOUR NERVES TO GET RID OF A HANGOVER: NO
EVER FELT BAD OR GUILTY ABOUT YOUR DRINKING: NO
HAVE YOU EVER FELT YOU SHOULD CUT DOWN ON YOUR DRINKING: NO
HAVE PEOPLE ANNOYED YOU BY CRITICIZING YOUR DRINKING: NO
TOTAL SCORE: 0

## 2025-05-29 ASSESSMENT — PAIN - FUNCTIONAL ASSESSMENT
PAIN_FUNCTIONAL_ASSESSMENT: 0-10
PAIN_FUNCTIONAL_ASSESSMENT: 0-10

## 2025-05-29 NOTE — ED TRIAGE NOTES
"Pt arrived by [facility], pt was walking around in her room around 2200 and felt very weak and stated that she \"felt like life is draining out of me\". Pt felt dizzy and started vomiting. Pt has hx of breast cancer and renal failure with dialysis M,W,F.    "

## 2025-05-29 NOTE — ED PROVIDER NOTES
HPI   Chief Complaint   Patient presents with    Vomiting    Dizziness       79-year-old female from the [place] for chief complaint of vomiting.  Patient states that she came back and sat on her bed and she felt like all the energy drained out of her.  She felt dizzy as and lightheaded no room spinning and she started vomiting.  Patient is a Monday Wednesday Friday dialysis patient did have dialysis today.  A lot of fluid was taken off.  She states this has happened to her in the past.  She states she was very clammy.  She denied any chest pain or shortness of breath during the event or before the event.  She feels much better right now and is no longer actively vomiting.                  Patient History   Medical History[1]  Surgical History[2]  Family History[3]  Social History[4]    Physical Exam   ED Triage Vitals   Temperature Heart Rate Respirations BP   05/29/25 0006 05/29/25 0000 05/29/25 0000 05/29/25 0000   36.1 °C (97 °F) 78 13 (!) 196/80      Pulse Ox Temp Source Heart Rate Source Patient Position   05/29/25 0000 05/29/25 0006 05/29/25 0006 05/29/25 0006   100 % Temporal Monitor Sitting      BP Location FiO2 (%)     05/29/25 0006 --     Left arm        Physical Exam  Vitals and nursing note reviewed.   Constitutional:       Appearance: Normal appearance.   HENT:      Head: Normocephalic and atraumatic.      Nose: Nose normal.      Mouth/Throat:      Mouth: Mucous membranes are moist.   Eyes:      Extraocular Movements: Extraocular movements intact.      Pupils: Pupils are equal, round, and reactive to light.   Cardiovascular:      Rate and Rhythm: Normal rate and regular rhythm.   Pulmonary:      Effort: Pulmonary effort is normal.      Breath sounds: Normal breath sounds.   Abdominal:      General: Abdomen is flat.      Palpations: Abdomen is soft.   Musculoskeletal:         General: Normal range of motion.      Cervical back: Normal range of motion.   Skin:     General: Skin is warm and dry.       Capillary Refill: Capillary refill takes less than 2 seconds.   Neurological:      General: No focal deficit present.      Mental Status: She is alert.      Comments: Patient has an NIH of 0   Psychiatric:         Mood and Affect: Mood normal.           ED Course & MDM   Diagnoses as of 05/30/25 2129   Near syncope                 No data recorded     [place] Coma Scale Score: 15 (05/29/25 0036 : Lexi Scarlettsally Alexandra)                           Medical Decision Making  Medical Decision Making: Patient was fully worked up EKG interpreted by me shows a heart rate of 79 normal sinus rhythm with normal axis and normal intervals.  QTc is 480 ms.  No acute ST and T wave changes are noted.  Patient was fully worked up lab work is all at baseline consistent with dialysis labs.  Slightly hyponatremic at 133.  A CT scan of the head and CT of the abdomen pelvis do not show any acute process.  She was given a liter of fluid and feels much better upon repeat evaluation.  She was also given Zofran.  She is no longer vomiting.  Her repeat blood pressure is 165/70.  She wants to go home she states she is got injections at 10 AM for her knees and she does not want a miss it.  She can be safely discharged back to [facility] with very close follow-up.  I do feel this was a possible near syncope event secondary to dialysis . I do not feel this was or is stroke, MI.  Patient has a baseline normal EKG and 2 troponins that are slightly elevated but not significantly enough.    [unfilled]     Ninoska Fernández D.O.  Emergency Medicine          Procedure  Procedures       Ninoska Fernández,   05/29/25 0225       [1]   Past Medical History:  Diagnosis Date    Body mass index (BMI) 31.0-31.9, adult 04/12/2019    BMI 31.0-31.9,adult    Encounter for follow-up examination after completed treatment for conditions other than malignant neoplasm 11/30/2017    Hospital discharge follow-up    Other conditions influencing health  status 03/12/2019    History of cough    Other specified disorders of bone, unspecified site 03/11/2015    Abnormal bone formation    Personal history of malignant neoplasm of breast 02/05/2014    Personal history of breast cancer    Personal history of malignant neoplasm, unspecified     History of malignant neoplasm    Personal history of other diseases of the circulatory system     History of hypertension    Personal history of other diseases of the digestive system     History of gastroesophageal reflux (GERD)    Personal history of other diseases of the musculoskeletal system and connective tissue 01/21/2016    History of trigger finger    Personal history of other diseases of the musculoskeletal system and connective tissue     History of arthritis    Personal history of other diseases of the nervous system and sense organs     History of sleep apnea    Personal history of other diseases of urinary system     History of kidney disease    Respiratory failure, unspecified with hypoxia 12/29/2017    Respiratory failure with hypoxia    Shortness of breath 10/06/2017    Exertional shortness of breath    Unspecified cataract     Cataracts, bilateral    Unspecified fracture of unspecified forearm, sequela 10/04/2018    Forearm fracture, sequela   [2]   Past Surgical History:  Procedure Laterality Date    COLONOSCOPY  02/05/2014    Colonoscopy (Fiberoptic)    IR CVC TUNNELED  10/12/2021    IR CVC TUNNELED 10/12/2021 [facility]    KNEE ARTHROSCOPY W/ MENISCAL REPAIR  04/01/2014    Knee Arthroscopy With Medial Meniscus Repair    MASTECTOMY  02/05/2014    Breast Surgery Mastectomy    MR HEAD ANGIO WO IV CONTRAST  2/23/2019    MR HEAD ANGIO WO IV CONTRAST 2/23/2019 [facility]    MR NECK ANGIO WO IV CONTRAST  2/23/2019    MR NECK ANGIO WO IV CONTRAST 2/23/2019 [facility]    OTHER SURGICAL HISTORY  09/21/2021    Arteriovenous fistula creation procedure    ROTATOR CUFF REPAIR  04/01/2014     Rotator Cuff Repair    TONSILLECTOMY  02/05/2014    Tonsillectomy   [3]   Family History  Problem Relation Name Age of Onset    Hypertension Mother      Leukemia Mother      Osteoarthritis Mother      Colon cancer Father      Diabetes Father      Hypertension Father      Osteoarthritis Sister      Osteoarthritis Other Aunt     Stroke Other Aunt     Stroke Other Uncle     Breast cancer Other Family History    [4]   Social History  Tobacco Use    Smoking status: Never    Smokeless tobacco: Never   Vaping Use    Vaping status: Never Used   Substance Use Topics    Alcohol use: Never    Drug use: Never        Ninoska Fernández DO  05/30/25 5601

## 2025-06-03 ENCOUNTER — OFFICE VISIT (OUTPATIENT)
Dept: CARDIOLOGY | Facility: HOSPITAL | Age: 80
End: 2025-06-03
Payer: MEDICARE

## 2025-06-03 VITALS
DIASTOLIC BLOOD PRESSURE: 81 MMHG | OXYGEN SATURATION: 92 % | HEART RATE: 86 BPM | WEIGHT: 175 LBS | SYSTOLIC BLOOD PRESSURE: 157 MMHG | BODY MASS INDEX: 34.18 KG/M2

## 2025-06-03 DIAGNOSIS — E78.2 MIXED HYPERLIPIDEMIA: ICD-10-CM

## 2025-06-03 PROCEDURE — 99214 OFFICE O/P EST MOD 30 MIN: CPT | Performed by: NURSE PRACTITIONER

## 2025-06-03 PROCEDURE — 3079F DIAST BP 80-89 MM HG: CPT | Performed by: NURSE PRACTITIONER

## 2025-06-03 PROCEDURE — 1159F MED LIST DOCD IN RCRD: CPT | Performed by: NURSE PRACTITIONER

## 2025-06-03 PROCEDURE — 1157F ADVNC CARE PLAN IN RCRD: CPT | Performed by: NURSE PRACTITIONER

## 2025-06-03 PROCEDURE — 3077F SYST BP >= 140 MM HG: CPT | Performed by: NURSE PRACTITIONER

## 2025-06-03 RX ORDER — ATORVASTATIN CALCIUM 20 MG/1
20 TABLET, FILM COATED ORAL NIGHTLY
Qty: 90 TABLET | Refills: 3 | Status: SHIPPED | OUTPATIENT
Start: 2025-06-03 | End: 2026-06-03

## 2025-06-03 NOTE — PROGRESS NOTES
Chief Complaint:   Follow up      History Of Present Illness:    Joyce Bethea is a 79 y.o. 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  She was hospitalized in January with norovirus and again in February with COVID/pneumonia.  In April, she required hospitalization after syncopal event-- event occurred after returning back to the [place] from dialysis.  She has not had recurrent syncope, but does feel occasionally lightheaded and nauseated after HD.  She has been holding her BP meds prior to HD and taking coreg as BP tolerates in the evening after HD.  Home BP log reviewed for BP's mostly 120-140/70-80's with occasional -170 (rarely).  Denies chest pain or pressure.      Last Recorded Vitals:  Vitals:    06/03/25 1009   BP: 157/81   Pulse: 86   SpO2: 92%   Weight: 79.4 kg (175 lb)       Past Medical History:  She has a past medical history of Body mass index (BMI) 31.0-31.9, adult (04/12/2019), Encounter for follow-up examination after completed treatment for conditions other than malignant neoplasm (11/30/2017), Other conditions influencing health status (03/12/2019), Other specified disorders of bone, unspecified site (03/11/2015), Personal history of malignant neoplasm of breast (02/05/2014), Personal history of malignant neoplasm, unspecified, Personal history of other diseases of the circulatory system, Personal history of other diseases of the digestive system, Personal history of other diseases of the musculoskeletal system and connective tissue (01/21/2016), Personal history of other diseases of the musculoskeletal system and connective tissue, Personal history of other diseases of the nervous system and sense organs, Personal history of other diseases of urinary system, Respiratory  Writer attempted to contact patient in regards to results. Voicemail box is not set up. Writer will attempt to call at later time.    Chelsea Loving MD Schimelfenyg, Shanell L, RN  Can you let her know small stones,seen, should fu in 6 mo with renal us thanks!   failure, unspecified with hypoxia (12/29/2017), Shortness of breath (10/06/2017), Unspecified cataract, and Unspecified fracture of unspecified forearm, sequela (10/04/2018).    Past Surgical History:  She has a past surgical history that includes Colonoscopy (02/05/2014); Tonsillectomy (02/05/2014); Mastectomy (02/05/2014); Rotator cuff repair (04/01/2014); Knee arthroscopy w/ meniscal repair (04/01/2014); Other surgical history (09/21/2021); MR angio head wo IV contrast (2/23/2019); MR angio neck wo IV contrast (2/23/2019); and IR CVC tunneled (10/12/2021).      Social History:  She reports that she has never smoked. She has never used smokeless tobacco. She reports that she does not drink alcohol and does not use drugs.    Family History:  Family History[1]     Allergies:  Bee pollen, House dust, House dust mite, and Adhesive tape-silicones    Outpatient Medications:  Current Outpatient Medications   Medication Instructions    acetaminophen (TYLENOL) 650 mg, Every 8 hours PRN    amitriptyline (ELAVIL) 10 mg, Daily    amLODIPine (NORVASC) 5 mg, oral, Daily    atorvastatin (LIPITOR) 20 mg, oral, Nightly    B complex-vitamin C-folic acid (Nephrocaps) 1 mg capsule 1 capsule, Daily    Bacillus coagulans/inulin (PROBIOTIC FORMULA, INULIN, ORAL) 1 capsule, Daily    benzonatate (TESSALON) 200 mg, 3 times daily PRN    calcitriol (ROCALTROL) 0.25 mcg, Every Mon/Wed/Fri    calcium carbonate (Tums) 200 mg calcium chewable tablet 3-4 tablets, 3 times daily PRN    carvedilol (Coreg) 25 mg tablet 1 tablet, 2 times daily    cholecalciferol (VITAMIN D-3) 50 mcg, Daily    epoetin palomo (Procrit) 2,000 unit/mL injection 1 mL, Every Mon/Wed/Fri    guaiFENesin (MUCINEX) 1,200 mg, 2 times daily PRN    hydroxyurea (HYDREA) 500 mg, oral, Every Mon/Wed/Fri, M, W, F    loratadine (CLARITIN) 10 mg, Daily PRN    losartan (COZAAR) 100 mg, Daily    meclizine (ANTIVERT) 12.5 mg, oral, 3 times daily PRN    nitroglycerin (NITROSTAT) 0.4 mg, Every  5 min PRN    omeprazole (PRILOSEC) 20 mg, Daily PRN    ondansetron ODT (ZOFRAN-ODT) 4 mg, oral, Every 8 hours PRN    oxygen (O2) gas therapy Oxygen   Refills: 0       Active    sennosides-docusate sodium (Senna-S) 8.6-50 mg tablet 1 tablet, Nightly    sodium chloride (Ocean) 0.65 % nasal spray 2 sprays, Every 1 hour PRN    trolamine salicylate (Aspercreme) 10 % cream 1 Application, Topical, 3 times daily, To upper arms       Physical Exam:  Constitutional: Pleasant, Awake/Alert/Oriented to person place and time.   Head: Atraumatic, Normocephalic  Eyes: EOMI. MAK  Neck: No JVD  Cardiovascular: Regular rate and rhythm, S1, S2. No extra heart sounds or murmurs  Respiratory: Clear to auscultation bilaterally. No wheezing, rales or rhonchi.   Abdomen: Soft, Nontender. Bowel sounds appreciated  Musculoskeletal: ROM intact. Muscle strength grossly intact upper and lower extremities 5/5.   Neurological: CNII-XII intact. Sensation grossly intact  Extremities: Warm and dry. No acute rashes and lesions  Psychiatric: Appropriate mood and affect       Last Labs:  CBC -  Lab Results   Component Value Date    WBC 9.1 05/28/2025    HGB 14.7 05/28/2025    HCT 45.4 05/28/2025    MCV 99 05/28/2025     05/28/2025       CMP -  Lab Results   Component Value Date    CALCIUM 9.5 05/28/2025    PHOS 5.3 (H) 04/09/2025    PROT 6.9 05/28/2025    ALBUMIN 4.2 05/28/2025    AST 23 05/28/2025    ALT 25 05/28/2025    ALKPHOS 237 (H) 05/28/2025    BILITOT 0.4 05/28/2025       LIPID PANEL -   Lab Results   Component Value Date    CHOL 148 10/30/2024    TRIG 88 10/30/2024    HDL 64.1 10/30/2024    CHHDL 2.3 10/30/2024    LDLF 105 (H) 10/03/2021    VLDL 18 10/30/2024    NHDL 84 10/30/2024       RENAL FUNCTION PANEL -   Lab Results   Component Value Date    GLUCOSE 112 (H) 05/28/2025     (L) 05/28/2025    K 4.5 05/28/2025    CL 98 05/28/2025    CO2 27 05/28/2025    ANIONGAP 13 05/28/2025    BUN 40 (H) 05/28/2025    CREATININE 4.42 (H)  2025    CALCIUM 9.5 2025    PHOS 5.3 (H) 2025    ALBUMIN 4.2 2025        Lab Results   Component Value Date     (H) 2025    HGBA1C 5.5 10/30/2024       Last Cardiology Tests:  Echo:  2025 Echo:  CONCLUSIONS:   1. The left ventricular systolic function is normal, with a visually estimated ejection fraction of 65-70%.   2. Right ventricular volume overload.   3. There is severely increased concentric left ventricular hypertrophy.   4. There is normal right ventricular global systolic function.   5. There is mild mitral valve stenosis.   6. There is moderate mitral annular calcification.   7. Moderately elevated right ventricular systolic pressure.    2023  CONCLUSIONS:  1. Left ventricular systolic function is normal with a 55-60% estimated ejection fraction.  2. Spectral Doppler shows an impaired relaxation pattern of left ventricular diastolic filling.  3. The left atrium is moderately dilated.  4. Mild to moderate mitral valve regurgitation.  5. Mild aortic valve regurgitation.  6. There is mild tricuspid regurgitation.  7. The estimated pulmonary artery pressure is mild-moderately elevated with the RVSP at 49.5 mmHg.     10/4/2021 echo:  CONCLUSIONS:  1. The left ventricular systolic function is normal with a 60-65% estimated ejection fraction.  2. Spectral Doppler shows an impaired relaxation pattern of left ventricular diastolic filling.  3. There is mild mitral and tricuspid regurgitation.  4. Moderately elevated pulmonary artery pressure, estimated RVSP 57mmHg.    Cardiac Imagin2025-2025 zio:   Min HR 53, Max , Avg HR 75bpm- NSR  24 SVT with fastest/longest 33.8sec-- asymptomatic     Lab review: I have personally reviewed the laboratory result(s)   Diagnostic review: I have personally reviewed the result(s) of the Echocardiogram and ECG     Assessment/Plan   Very pleasant 79 y.o. 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     Plan:  -Agree with holding antihypertensives prior to HD to avoid hypotension   -Hold evening dose of coreg 25mg BID after HD if SBP is <110mmhg  -Continue amlodipine 5mg daily, atorvastatin 20mg daily, losartan 100mg daily   -Follow up in 6 months or sooner if needed       Ursula Welch, APRN-CNP       [1]   Family History  Problem Relation Name Age of Onset    Hypertension Mother      Leukemia Mother      Osteoarthritis Mother      Colon cancer Father      Diabetes Father      Hypertension Father      Osteoarthritis Sister      Osteoarthritis Other Aunt     Stroke Other Aunt     Stroke Other Uncle     Breast cancer Other Family History

## 2025-06-05 LAB
ATRIAL RATE: 79 BPM
P AXIS: 60 DEGREES
P OFFSET: 178 MS
P ONSET: 127 MS
PR INTERVAL: 184 MS
Q ONSET: 219 MS
QRS COUNT: 13 BEATS
QRS DURATION: 100 MS
QT INTERVAL: 434 MS
QTC CALCULATION(BAZETT): 497 MS
QTC FREDERICIA: 475 MS
R AXIS: 40 DEGREES
T AXIS: 42 DEGREES
T OFFSET: 436 MS
VENTRICULAR RATE: 79 BPM

## 2025-06-18 DIAGNOSIS — D47.3 ESSENTIAL THROMBOCYTHEMIA (MULTI): Primary | ICD-10-CM

## 2025-06-19 ENCOUNTER — OFFICE VISIT (OUTPATIENT)
Dept: HEMATOLOGY/ONCOLOGY | Facility: CLINIC | Age: 80
End: 2025-06-19
Payer: MEDICARE

## 2025-06-19 ENCOUNTER — HOSPITAL ENCOUNTER (OUTPATIENT)
Dept: VASCULAR MEDICINE | Facility: HOSPITAL | Age: 80
Discharge: HOME | End: 2025-06-19
Payer: MEDICARE

## 2025-06-19 VITALS
OXYGEN SATURATION: 96 % | WEIGHT: 165.34 LBS | HEART RATE: 58 BPM | BODY MASS INDEX: 32.29 KG/M2 | RESPIRATION RATE: 16 BRPM | SYSTOLIC BLOOD PRESSURE: 149 MMHG | DIASTOLIC BLOOD PRESSURE: 74 MMHG | TEMPERATURE: 96.8 F

## 2025-06-19 DIAGNOSIS — R55 SYNCOPE AND COLLAPSE: ICD-10-CM

## 2025-06-19 DIAGNOSIS — D47.3 ESSENTIAL THROMBOCYTHEMIA (MULTI): ICD-10-CM

## 2025-06-19 LAB
ALBUMIN SERPL BCP-MCNC: 4.1 G/DL (ref 3.4–5)
ALP SERPL-CCNC: 259 U/L (ref 33–136)
ALT SERPL W P-5'-P-CCNC: 31 U/L (ref 7–45)
ANION GAP SERPL CALC-SCNC: 16 MMOL/L (ref 10–20)
AST SERPL W P-5'-P-CCNC: 25 U/L (ref 9–39)
BASOPHILS # BLD AUTO: 0.04 X10*3/UL (ref 0–0.1)
BASOPHILS NFR BLD AUTO: 0.5 %
BILIRUB SERPL-MCNC: 0.4 MG/DL (ref 0–1.2)
BUN SERPL-MCNC: 42 MG/DL (ref 6–23)
CALCIUM SERPL-MCNC: 9.8 MG/DL (ref 8.6–10.3)
CHLORIDE SERPL-SCNC: 95 MMOL/L (ref 98–107)
CO2 SERPL-SCNC: 27 MMOL/L (ref 21–32)
CREAT SERPL-MCNC: 4.96 MG/DL (ref 0.5–1.05)
EGFRCR SERPLBLD CKD-EPI 2021: 8 ML/MIN/1.73M*2
EOSINOPHIL # BLD AUTO: 0.18 X10*3/UL (ref 0–0.4)
EOSINOPHIL NFR BLD AUTO: 2.3 %
ERYTHROCYTE [DISTWIDTH] IN BLOOD BY AUTOMATED COUNT: 16.4 % (ref 11.5–14.5)
FERRITIN SERPL-MCNC: 1251 NG/ML (ref 8–150)
FOLATE SERPL-MCNC: >24 NG/ML
GLUCOSE SERPL-MCNC: 120 MG/DL (ref 74–99)
HCT VFR BLD AUTO: 38.5 % (ref 36–46)
HGB BLD-MCNC: 12.4 G/DL (ref 12–16)
IMM GRANULOCYTES # BLD AUTO: 0.02 X10*3/UL (ref 0–0.5)
IMM GRANULOCYTES NFR BLD AUTO: 0.3 % (ref 0–0.9)
IRON SATN MFR SERPL: 49 % (ref 25–45)
IRON SERPL-MCNC: 120 UG/DL (ref 35–150)
LYMPHOCYTES # BLD AUTO: 0.89 X10*3/UL (ref 0.8–3)
LYMPHOCYTES NFR BLD AUTO: 11.4 %
MCH RBC QN AUTO: 31.2 PG (ref 26–34)
MCHC RBC AUTO-ENTMCNC: 32.2 G/DL (ref 32–36)
MCV RBC AUTO: 97 FL (ref 80–100)
MONOCYTES # BLD AUTO: 0.47 X10*3/UL (ref 0.05–0.8)
MONOCYTES NFR BLD AUTO: 6 %
NEUTROPHILS # BLD AUTO: 6.22 X10*3/UL (ref 1.6–5.5)
NEUTROPHILS NFR BLD AUTO: 79.5 %
PLATELET # BLD AUTO: 291 X10*3/UL (ref 150–450)
POTASSIUM SERPL-SCNC: 4.1 MMOL/L (ref 3.5–5.3)
PROT SERPL-MCNC: 7.1 G/DL (ref 6.4–8.2)
RBC # BLD AUTO: 3.98 X10*6/UL (ref 4–5.2)
SODIUM SERPL-SCNC: 134 MMOL/L (ref 136–145)
TIBC SERPL-MCNC: 244 UG/DL (ref 240–445)
UIBC SERPL-MCNC: 124 UG/DL (ref 110–370)
VIT B12 SERPL-MCNC: 907 PG/ML (ref 211–911)
WBC # BLD AUTO: 7.8 X10*3/UL (ref 4.4–11.3)

## 2025-06-19 PROCEDURE — 82728 ASSAY OF FERRITIN: CPT | Performed by: PHYSICIAN ASSISTANT

## 2025-06-19 PROCEDURE — 93880 EXTRACRANIAL BILAT STUDY: CPT | Performed by: INTERNAL MEDICINE

## 2025-06-19 PROCEDURE — 3078F DIAST BP <80 MM HG: CPT | Performed by: PHYSICIAN ASSISTANT

## 2025-06-19 PROCEDURE — 83540 ASSAY OF IRON: CPT | Performed by: PHYSICIAN ASSISTANT

## 2025-06-19 PROCEDURE — 85025 COMPLETE CBC W/AUTO DIFF WBC: CPT | Performed by: PHYSICIAN ASSISTANT

## 2025-06-19 PROCEDURE — 84520 ASSAY OF UREA NITROGEN: CPT | Performed by: PHYSICIAN ASSISTANT

## 2025-06-19 PROCEDURE — 36415 COLL VENOUS BLD VENIPUNCTURE: CPT | Performed by: PHYSICIAN ASSISTANT

## 2025-06-19 PROCEDURE — 3077F SYST BP >= 140 MM HG: CPT | Performed by: PHYSICIAN ASSISTANT

## 2025-06-19 PROCEDURE — 99214 OFFICE O/P EST MOD 30 MIN: CPT | Performed by: PHYSICIAN ASSISTANT

## 2025-06-19 PROCEDURE — 82746 ASSAY OF FOLIC ACID SERUM: CPT | Mod: GEALAB | Performed by: PHYSICIAN ASSISTANT

## 2025-06-19 PROCEDURE — 82607 VITAMIN B-12: CPT | Mod: GEALAB | Performed by: PHYSICIAN ASSISTANT

## 2025-06-19 PROCEDURE — 1159F MED LIST DOCD IN RCRD: CPT | Performed by: PHYSICIAN ASSISTANT

## 2025-06-19 PROCEDURE — 1125F AMNT PAIN NOTED PAIN PRSNT: CPT | Performed by: PHYSICIAN ASSISTANT

## 2025-06-19 PROCEDURE — 93880 EXTRACRANIAL BILAT STUDY: CPT

## 2025-06-19 ASSESSMENT — PAIN SCALES - GENERAL: PAINLEVEL_OUTOF10: 5

## 2025-06-19 NOTE — PROGRESS NOTES
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    #2) breast cancer; 1) s/p mastectomy in 1991 s/p chemotherapy and tamoxifen. contralateral breast cancer in 2009 treated with mastectomy and arimidex for 5 years.    #3) CRI on dialysis     History of Present Illness:   Patient presents for follow up. Reports having syncopal episodes after dialysis.  Continues on Hydrea 500 mg MWF. Tolerating well. No new complaints.    Review of Systems:    All of the systems have been reviewed and are negative for complaints except what is stated in the HPI and/or past medical history.    Allergies and Intolerances:  Allergies   Allergen Reactions    Bee Pollen Unknown    House Dust Unknown    House Dust Mite Unknown    Adhesive Tape-Silicones Rash     Outpatient Medication Profile:  Current Outpatient Medications on File Prior to Visit   Medication Sig Dispense Refill    acetaminophen (Tylenol) 325 mg tablet Take 2 tablets (650 mg) by mouth every 8 hours if needed for mild pain (1 - 3), headaches or fever (temp greater than 38.0 C). Max 2000mg daily      amitriptyline (Elavil) 10 mg tablet Take 1 tablet (10 mg) by mouth once daily.      amLODIPine (Norvasc) 5 mg tablet Take 1 tablet (5 mg) by mouth once daily.      atorvastatin (Lipitor) 20 mg tablet Take 1 tablet (20 mg) by mouth once daily at bedtime. 90 tablet 3    B complex-vitamin C-folic acid (Nephrocaps) 1 mg capsule Take 1 capsule by mouth once daily.      Bacillus coagulans/inulin (PROBIOTIC FORMULA, INULIN, ORAL) Take 1 capsule by mouth once daily.      benzonatate (Tessalon) 200 mg capsule Take 1 capsule (200 mg) by mouth 3 times a day as needed for cough. Do not crush or chew.      calcitriol (Rocaltrol) 0.25 mcg capsule Take 1 capsule (0.25 mcg) by mouth once a day on Monday,  Wednesday, and Friday.      calcium carbonate (Tums) 200 mg calcium chewable tablet Chew 3-4 tablets 3 times a day as needed for indigestion or heartburn.      carvedilol (Coreg) 25 mg tablet Take 1 tablet (25 mg) by mouth 2 times a day.      cholecalciferol (Vitamin D-3) 50 mcg (2,000 unit) capsule Take 1 capsule (50 mcg) by mouth early in the morning..      epoetin palomo (Procrit) 2,000 unit/mL injection Inject 1 mL (2,000 Units) under the skin once a day on Monday, Wednesday, and Friday.      guaiFENesin (Mucinex) 600 mg 12 hr tablet Take 2 tablets (1,200 mg) by mouth 2 times a day as needed for cough. Do not crush, chew, or split.      hydroxyurea (Hydrea) 500 mg capsule Take 1 capsule (500 mg total) by mouth once a day on Monday, Wednesday, and Friday.  M, W, F 90 capsule 1    loratadine (Claritin) 10 mg tablet Take 1 tablet (10 mg) by mouth once daily as needed for allergies. In the morning      losartan (Cozaar) 100 mg tablet Take 1 tablet (100 mg) by mouth once daily.      meclizine (Antivert) 12.5 mg tablet Take 1 tablet (12.5 mg) by mouth 3 times a day as needed for dizziness. 30 tablet 0    nitroglycerin (Nitrostat) 0.4 mg SL tablet Place 1 tablet (0.4 mg) under the tongue every 5 minutes if needed for chest pain.      omeprazole (PriLOSEC) 20 mg DR capsule Take 1 capsule (20 mg) by mouth once daily as needed.      ondansetron ODT (Zofran-ODT) 4 mg disintegrating tablet Dissolve 1 tablet (4 mg) in the mouth every 8 hours if needed for nausea or vomiting. 20 tablet 0    oxygen (O2) gas therapy Oxygen   Refills: 0       Active      sennosides-docusate sodium (Senna-S) 8.6-50 mg tablet Take 1 tablet by mouth once daily at bedtime.      sodium chloride (Ocean) 0.65 % nasal spray Administer 2 sprays into each nostril every 1 hour if needed for congestion.      trolamine salicylate (Aspercreme) 10 % cream Apply 1 Application topically 3 times a day. To upper arms       No current facility-administered  "medications on file prior to visit.     Vitals:      5/29/2025    12:06 AM 5/29/2025     1:00 AM 5/29/2025     2:00 AM 5/29/2025     3:00 AM 5/29/2025     4:00 AM 6/3/2025    10:09 AM 6/19/2025     1:13 PM   Vitals   Systolic 200 182 165 159 157 157 149   Diastolic 78 73 70 64 66 81 74   BP Location Left arm   Left arm Left arm  Left arm   Heart Rate 79 82 82 78 71 86 58   Temp 36.1 °C (97 °F)    36.2 °C (97.2 °F)  36 °C (96.8 °F)   Resp 15 16 17 14 14  16   Height 1.524 m (5')         Weight (lb) 175     175 165.35   BMI 34.18 kg/m2     34.18 kg/m2 32.29 kg/m2   BSA (m2) 1.83 m2     1.83 m2 1.78 m2   Visit Report      Report Report     Physical Exam:   Constitutional: alert, awake and oriented, not in acute distress   HEENT: moist mucous membranes, normal nose   Neck: supple, no lymphadenopathy   EYES: PERRL, EOM intact, conjunctiva normal  Skin: no jaundice, rash or erythema  Neurological: AAOx3, no gross focal deficit   Psychiatric: normal mood and behavior     Labs:  Labs:  Lab Results   Component Value Date    WBC 7.8 06/19/2025    NEUTROABS 6.22 (H) 06/19/2025    IGABSOL 0.02 06/19/2025    LYMPHSABS 0.89 06/19/2025    MONOSABS 0.47 06/19/2025    EOSABS 0.18 06/19/2025    BASOSABS 0.04 06/19/2025    RBC 3.98 (L) 06/19/2025    MCV 97 06/19/2025    MCHC 32.2 06/19/2025    HGB 12.4 06/19/2025    HCT 38.5 06/19/2025     06/19/2025     No results found for: \"RETICCTPCT\"   Lab Results   Component Value Date    CREATININE 4.42 (H) 05/28/2025    BUN 40 (H) 05/28/2025    EGFR 10 (L) 05/28/2025     (L) 05/28/2025    K 4.5 05/28/2025    CL 98 05/28/2025    CO2 27 05/28/2025      Lab Results   Component Value Date    ALT 25 05/28/2025    AST 23 05/28/2025     (H) 12/04/2023    ALKPHOS 237 (H) 05/28/2025    BILITOT 0.4 05/28/2025      Assessment/Plan:    73 yo with history of myeloproliferative disease which appears from the records to be mostly like essential thrombocytosis for which she has been on " anagrelide for many years with good control of her platelets.     Currently on Hydrea 500 mg MWF due to recall of Anagrelide 0.5 mg. Platelets at goal.     Anemia from CRI/ESRD, FTN > 100. Getting Procrit PRN at dialysis; goal is HgB 10-11    Follow up pulmonary and cardiology.     Ferritin >1000; HFE c/w H63D heterozygous. Discussed limiting the amount of iron she receives at dialysis.  Made nephrologist aware.     Being worked up for syncopal episodes     Continue to monitor CBC and Ferritin/iron studies every 6 months    RTC in 6 months    Patient verbalized understanding, and all her questions were answered to her satisfaction    30 min spent with patient greater than 50 % of which was spent in consultation and coordination of care.